# Patient Record
Sex: FEMALE | Race: WHITE | NOT HISPANIC OR LATINO | Employment: OTHER | URBAN - METROPOLITAN AREA
[De-identification: names, ages, dates, MRNs, and addresses within clinical notes are randomized per-mention and may not be internally consistent; named-entity substitution may affect disease eponyms.]

---

## 2023-03-28 ENCOUNTER — TELEPHONE (OUTPATIENT)
Dept: PRIMARY CARE | Facility: CLINIC | Age: 73
End: 2023-03-28
Payer: MEDICARE

## 2023-03-28 DIAGNOSIS — K04.7 DENTAL INFECTION: Primary | ICD-10-CM

## 2023-03-28 RX ORDER — AZITHROMYCIN 500 MG/1
500 TABLET, FILM COATED ORAL DAILY
Qty: 5 TABLET | Refills: 0 | Status: SHIPPED | OUTPATIENT
Start: 2023-03-28 | End: 2023-04-02

## 2023-03-28 NOTE — TELEPHONE ENCOUNTER
Patient calling states she has a dentist appointment tomorrow and her dentist told her to call her primary to get a antibiotic, patient is getting a tooth extracted.   Patient can not take penicillin and tetracycline.

## 2023-05-04 ENCOUNTER — PATIENT OUTREACH (OUTPATIENT)
Dept: CARE COORDINATION | Facility: CLINIC | Age: 73
End: 2023-05-04
Payer: MEDICARE

## 2023-05-04 DIAGNOSIS — I50.9 CONGESTIVE HEART FAILURE, UNSPECIFIED HF CHRONICITY, UNSPECIFIED HEART FAILURE TYPE (MULTI): ICD-10-CM

## 2023-05-04 DIAGNOSIS — R06.02 SHORTNESS OF BREATH: ICD-10-CM

## 2023-05-04 NOTE — PROGRESS NOTES
Discharge Facility:[facility]  Discharge Diagnosis:I50.9 Congestive heart failure, R06.02 Shortness of breath  Admission Date:5/2/23  Discharge Date: 5/3/23    PCP Appointment Date: no contact made  Specialist Appointment Date:   Hospital Encounter and Summary: not available at this time   See discharge assessment below for further details

## 2023-05-05 DIAGNOSIS — E13.9 DIABETES 1.5, MANAGED AS TYPE 2 (MULTI): Primary | ICD-10-CM

## 2023-05-05 RX ORDER — BENZONATATE 100 MG/1
CAPSULE ORAL
COMMUNITY

## 2023-05-05 RX ORDER — ALBUTEROL SULFATE 0.63 MG/3ML
SOLUTION RESPIRATORY (INHALATION)
COMMUNITY
End: 2023-07-31 | Stop reason: SDUPTHER

## 2023-05-05 RX ORDER — ERGOCALCIFEROL 1.25 MG/1
1 CAPSULE ORAL
COMMUNITY
Start: 2018-03-27 | End: 2023-12-11 | Stop reason: ALTCHOICE

## 2023-05-05 RX ORDER — GLIMEPIRIDE 2 MG/1
1 TABLET ORAL DAILY
COMMUNITY
Start: 2021-07-19 | End: 2023-12-11 | Stop reason: SDUPTHER

## 2023-05-05 RX ORDER — LEVOFLOXACIN 500 MG/1
500 TABLET, FILM COATED ORAL DAILY
COMMUNITY
End: 2023-05-11 | Stop reason: ALTCHOICE

## 2023-05-05 RX ORDER — BLOOD SUGAR DIAGNOSTIC
STRIP MISCELLANEOUS
COMMUNITY
Start: 2022-03-25

## 2023-05-05 RX ORDER — FUROSEMIDE 20 MG/1
1 TABLET ORAL DAILY
COMMUNITY
End: 2023-05-11 | Stop reason: ALTCHOICE

## 2023-05-05 RX ORDER — ENOXAPARIN SODIUM 100 MG/ML
INJECTION SUBCUTANEOUS
COMMUNITY
Start: 2023-04-18 | End: 2023-12-11 | Stop reason: ALTCHOICE

## 2023-05-05 RX ORDER — ALBUTEROL SULFATE 90 UG/1
AEROSOL, METERED RESPIRATORY (INHALATION)
COMMUNITY
Start: 2019-09-03

## 2023-05-05 RX ORDER — DEXAMETHASONE SODIUM PHOSPHATE 4 MG/ML
INJECTION, SOLUTION INTRA-ARTICULAR; INTRALESIONAL; INTRAMUSCULAR; INTRAVENOUS; SOFT TISSUE
COMMUNITY
Start: 2020-04-07 | End: 2023-05-11 | Stop reason: ALTCHOICE

## 2023-05-05 RX ORDER — AMLODIPINE BESYLATE 2.5 MG/1
1 TABLET ORAL DAILY
COMMUNITY
Start: 2023-03-09 | End: 2023-07-31 | Stop reason: ALTCHOICE

## 2023-05-05 RX ORDER — ASPIRIN 81 MG/1
TABLET ORAL
COMMUNITY
Start: 2019-08-21

## 2023-05-05 RX ORDER — CLINDAMYCIN HYDROCHLORIDE 150 MG/1
CAPSULE ORAL
COMMUNITY
Start: 2023-04-19 | End: 2023-12-11 | Stop reason: ALTCHOICE

## 2023-05-05 RX ORDER — GLIMEPIRIDE 2 MG/1
TABLET ORAL
Qty: 90 TABLET | Refills: 0 | Status: SHIPPED | OUTPATIENT
Start: 2023-05-05 | End: 2023-07-29

## 2023-05-05 RX ORDER — WARFARIN SODIUM 5 MG/1
TABLET ORAL
COMMUNITY
End: 2023-12-22 | Stop reason: DRUGHIGH

## 2023-05-05 RX ORDER — CHOLECALCIFEROL (VITAMIN D3) 50 MCG
TABLET ORAL
COMMUNITY

## 2023-05-05 RX ORDER — METHYLPREDNISOLONE ACETATE 80 MG/ML
INJECTION, SUSPENSION INTRA-ARTICULAR; INTRALESIONAL; INTRAMUSCULAR; SOFT TISSUE
COMMUNITY
Start: 2019-09-03 | End: 2023-05-11 | Stop reason: ALTCHOICE

## 2023-05-05 RX ORDER — AMLODIPINE BESYLATE 10 MG/1
10 TABLET ORAL DAILY
COMMUNITY
End: 2023-07-31 | Stop reason: SINTOL

## 2023-05-05 RX ORDER — LEVOTHYROXINE SODIUM 75 UG/1
75 TABLET ORAL DAILY
COMMUNITY
End: 2024-01-02

## 2023-05-05 RX ORDER — ATORVASTATIN CALCIUM 20 MG/1
1 TABLET, FILM COATED ORAL NIGHTLY
COMMUNITY
Start: 2018-03-07 | End: 2024-05-24 | Stop reason: SDUPTHER

## 2023-05-05 RX ORDER — BROMPHENIRAMINE MALEATE, PSEUDOEPHEDRINE HYDROCHLORIDE, AND DEXTROMETHORPHAN HYDROBROMIDE 2; 30; 10 MG/5ML; MG/5ML; MG/5ML
SYRUP ORAL
COMMUNITY
Start: 2022-05-04 | End: 2023-05-11 | Stop reason: ALTCHOICE

## 2023-05-05 RX ORDER — METHYLPREDNISOLONE 4 MG/1
TABLET ORAL
COMMUNITY
Start: 2022-12-13 | End: 2023-05-11 | Stop reason: ALTCHOICE

## 2023-05-05 RX ORDER — DOXYCYCLINE HYCLATE 50 MG/1
1 CAPSULE ORAL 2 TIMES DAILY
COMMUNITY
Start: 2023-03-01 | End: 2023-05-11 | Stop reason: ALTCHOICE

## 2023-05-05 RX ORDER — PREDNISONE 10 MG/1
TABLET ORAL
COMMUNITY
Start: 2020-04-07 | End: 2023-05-11 | Stop reason: ALTCHOICE

## 2023-05-05 RX ORDER — METOPROLOL SUCCINATE 25 MG/1
1 TABLET, EXTENDED RELEASE ORAL DAILY
COMMUNITY
Start: 2019-12-10

## 2023-05-05 RX ORDER — GABAPENTIN 100 MG/1
CAPSULE ORAL
COMMUNITY
Start: 2020-04-07

## 2023-05-10 ENCOUNTER — APPOINTMENT (OUTPATIENT)
Dept: PRIMARY CARE | Facility: CLINIC | Age: 73
End: 2023-05-10
Payer: MEDICARE

## 2023-05-11 ENCOUNTER — OFFICE VISIT (OUTPATIENT)
Dept: PRIMARY CARE | Facility: CLINIC | Age: 73
End: 2023-05-11
Payer: MEDICARE

## 2023-05-11 VITALS
DIASTOLIC BLOOD PRESSURE: 69 MMHG | HEIGHT: 62 IN | OXYGEN SATURATION: 98 % | SYSTOLIC BLOOD PRESSURE: 145 MMHG | WEIGHT: 220 LBS | BODY MASS INDEX: 40.48 KG/M2 | RESPIRATION RATE: 18 BRPM | HEART RATE: 78 BPM | TEMPERATURE: 97.7 F

## 2023-05-11 DIAGNOSIS — E78.2 MIXED HYPERLIPIDEMIA: ICD-10-CM

## 2023-05-11 DIAGNOSIS — E55.9 VITAMIN D DEFICIENCY: ICD-10-CM

## 2023-05-11 DIAGNOSIS — E11.9 DIABETES MELLITUS WITHOUT COMPLICATION (MULTI): ICD-10-CM

## 2023-05-11 DIAGNOSIS — J96.11 HYPOXEMIC RESPIRATORY FAILURE, CHRONIC (MULTI): ICD-10-CM

## 2023-05-11 DIAGNOSIS — Z79.01 ANTICOAGULATION MANAGEMENT ENCOUNTER: ICD-10-CM

## 2023-05-11 DIAGNOSIS — Z51.81 ANTICOAGULATION MANAGEMENT ENCOUNTER: ICD-10-CM

## 2023-05-11 DIAGNOSIS — J41.1 MUCOPURULENT CHRONIC BRONCHITIS (MULTI): ICD-10-CM

## 2023-05-11 DIAGNOSIS — E66.01 MORBID (SEVERE) OBESITY DUE TO EXCESS CALORIES (MULTI): ICD-10-CM

## 2023-05-11 DIAGNOSIS — Z09 HOSPITAL DISCHARGE FOLLOW-UP: Primary | ICD-10-CM

## 2023-05-11 DIAGNOSIS — I50.21 ACUTE SYSTOLIC CONGESTIVE HEART FAILURE (MULTI): ICD-10-CM

## 2023-05-11 DIAGNOSIS — I10 PRIMARY HYPERTENSION: ICD-10-CM

## 2023-05-11 DIAGNOSIS — J45.40 MODERATE PERSISTENT ASTHMATIC BRONCHITIS WITHOUT COMPLICATION (HHS-HCC): ICD-10-CM

## 2023-05-11 LAB
ALANINE AMINOTRANSFERASE (SGPT) (U/L) IN SER/PLAS: 15 U/L (ref 7–45)
ALBUMIN (G/DL) IN SER/PLAS: 4.4 G/DL (ref 3.4–5)
ALKALINE PHOSPHATASE (U/L) IN SER/PLAS: 95 U/L (ref 33–136)
ANION GAP IN SER/PLAS: 13 MMOL/L (ref 10–20)
ASPARTATE AMINOTRANSFERASE (SGOT) (U/L) IN SER/PLAS: 16 U/L (ref 9–39)
BILIRUBIN TOTAL (MG/DL) IN SER/PLAS: 1.5 MG/DL (ref 0–1.2)
CALCIDIOL (25 OH VITAMIN D3) (NG/ML) IN SER/PLAS: 31 NG/ML
CALCIUM (MG/DL) IN SER/PLAS: 9.8 MG/DL (ref 8.6–10.6)
CARBON DIOXIDE, TOTAL (MMOL/L) IN SER/PLAS: 31 MMOL/L (ref 21–32)
CHLORIDE (MMOL/L) IN SER/PLAS: 99 MMOL/L (ref 98–107)
CHOLESTEROL (MG/DL) IN SER/PLAS: 142 MG/DL (ref 0–199)
CHOLESTEROL IN HDL (MG/DL) IN SER/PLAS: 44.3 MG/DL
CHOLESTEROL/HDL RATIO: 3.2
CREATININE (MG/DL) IN SER/PLAS: 0.97 MG/DL (ref 0.5–1.05)
ERYTHROCYTE DISTRIBUTION WIDTH (RATIO) BY AUTOMATED COUNT: 15 % (ref 11.5–14.5)
ERYTHROCYTE MEAN CORPUSCULAR HEMOGLOBIN CONCENTRATION (G/DL) BY AUTOMATED: 30.7 G/DL (ref 32–36)
ERYTHROCYTE MEAN CORPUSCULAR VOLUME (FL) BY AUTOMATED COUNT: 87 FL (ref 80–100)
ERYTHROCYTES (10*6/UL) IN BLOOD BY AUTOMATED COUNT: 4.36 X10E12/L (ref 4–5.2)
GFR FEMALE: 62 ML/MIN/1.73M2
GLUCOSE (MG/DL) IN SER/PLAS: 170 MG/DL (ref 74–99)
HEMATOCRIT (%) IN BLOOD BY AUTOMATED COUNT: 37.8 % (ref 36–46)
HEMOGLOBIN (G/DL) IN BLOOD: 11.6 G/DL (ref 12–16)
LDL: 67 MG/DL (ref 0–99)
LEUKOCYTES (10*3/UL) IN BLOOD BY AUTOMATED COUNT: 6.8 X10E9/L (ref 4.4–11.3)
NRBC (PER 100 WBCS) BY AUTOMATED COUNT: 0 /100 WBC (ref 0–0)
PLATELETS (10*3/UL) IN BLOOD AUTOMATED COUNT: 321 X10E9/L (ref 150–450)
POC FINGERSTICK BLOOD GLUCOSE: 175 MG/DL (ref 70–100)
POC INR: 3.3 (ref 0.9–1.1)
POTASSIUM (MMOL/L) IN SER/PLAS: 4 MMOL/L (ref 3.5–5.3)
PROTEIN TOTAL: 7.6 G/DL (ref 6.4–8.2)
SODIUM (MMOL/L) IN SER/PLAS: 139 MMOL/L (ref 136–145)
THYROTROPIN (MIU/L) IN SER/PLAS BY DETECTION LIMIT <= 0.05 MIU/L: 1.67 MIU/L (ref 0.44–3.98)
TRIGLYCERIDE (MG/DL) IN SER/PLAS: 153 MG/DL (ref 0–149)
UREA NITROGEN (MG/DL) IN SER/PLAS: 23 MG/DL (ref 6–23)
VLDL: 31 MG/DL (ref 0–40)

## 2023-05-11 PROCEDURE — 3044F HG A1C LEVEL LT 7.0%: CPT | Performed by: FAMILY MEDICINE

## 2023-05-11 PROCEDURE — 82306 VITAMIN D 25 HYDROXY: CPT

## 2023-05-11 PROCEDURE — 85027 COMPLETE CBC AUTOMATED: CPT

## 2023-05-11 PROCEDURE — 85610 PROTHROMBIN TIME: CPT | Performed by: FAMILY MEDICINE

## 2023-05-11 PROCEDURE — 84443 ASSAY THYROID STIM HORMONE: CPT

## 2023-05-11 PROCEDURE — 82962 GLUCOSE BLOOD TEST: CPT | Performed by: FAMILY MEDICINE

## 2023-05-11 PROCEDURE — 1159F MED LIST DOCD IN RCRD: CPT | Performed by: FAMILY MEDICINE

## 2023-05-11 PROCEDURE — 99495 TRANSJ CARE MGMT MOD F2F 14D: CPT | Performed by: FAMILY MEDICINE

## 2023-05-11 PROCEDURE — 83036 HEMOGLOBIN GLYCOSYLATED A1C: CPT

## 2023-05-11 PROCEDURE — 80061 LIPID PANEL: CPT

## 2023-05-11 PROCEDURE — 3078F DIAST BP <80 MM HG: CPT | Performed by: FAMILY MEDICINE

## 2023-05-11 PROCEDURE — 3077F SYST BP >= 140 MM HG: CPT | Performed by: FAMILY MEDICINE

## 2023-05-11 PROCEDURE — 1036F TOBACCO NON-USER: CPT | Performed by: FAMILY MEDICINE

## 2023-05-11 PROCEDURE — 80053 COMPREHEN METABOLIC PANEL: CPT

## 2023-05-11 RX ORDER — FUROSEMIDE 40 MG/1
40 TABLET ORAL 2 TIMES DAILY
COMMUNITY

## 2023-05-11 ASSESSMENT — PAIN SCALES - GENERAL: PAINLEVEL: 0-NO PAIN

## 2023-05-12 LAB
ESTIMATED AVERAGE GLUCOSE FOR HBA1C: 128 MG/DL
HEMOGLOBIN A1C/HEMOGLOBIN TOTAL IN BLOOD: 6.1 %

## 2023-05-13 PROBLEM — J45.909 ASTHMATIC BRONCHITIS WITHOUT COMPLICATION (HHS-HCC): Status: ACTIVE | Noted: 2023-05-13

## 2023-05-13 PROBLEM — Z09 HOSPITAL DISCHARGE FOLLOW-UP: Status: ACTIVE | Noted: 2023-05-13

## 2023-05-13 PROBLEM — J96.11 HYPOXEMIC RESPIRATORY FAILURE, CHRONIC (MULTI): Status: ACTIVE | Noted: 2023-05-13

## 2023-05-13 PROBLEM — J41.1 MUCOPURULENT CHRONIC BRONCHITIS (MULTI): Status: ACTIVE | Noted: 2023-05-13

## 2023-05-13 PROBLEM — E66.01 MORBID (SEVERE) OBESITY DUE TO EXCESS CALORIES (MULTI): Status: ACTIVE | Noted: 2023-05-13

## 2023-05-13 NOTE — PROGRESS NOTES
Subjective   Genet Wilks is a 73 y.o. female who presents for Follow-up (Pt reports swollen feet an legs and reports CHF).    HPI  : Patient is a 73-year-old female who is being seen in hospital follow-up after being admitted 9 days ago for congestive heart failure and shortness of breath.  Patient also had episodes of uncontrolled atrial fibrillation and she is diabetic and has a history of asthmatic bronchitis.  Did review her hospital chart and treatment.  She was diuresed and was seen by cardiology in  consultation.  Patient is still concerned because her ankles are swollen but she is not short of breath like she was and she needs her Coumadin INR rechecked today and her blood sugar.  Pulse ox was stable at 98%.      Objective ROS  : 10 systems were reviewed and the information is included in the HPI and no additional review of systems is indicated.    Physical Exam  Constitutional:       Appearance: Normal appearance. She is obese.      Comments: Patient is alert and oriented and feeling much better than she did 9 days ago when she went into the hospital.  She still has some ankle edema and some shortness of breath with ambulation but otherwise is feeling much better.  She knows she has to get back on her diet and lose weight.   HENT:      Head: Normocephalic.      Right Ear: Tympanic membrane and ear canal normal.      Left Ear: Tympanic membrane and ear canal normal.      Nose: Rhinorrhea present.      Comments: Patient does have allergic rhinitis.  Spring and summer aggravate her allergies.     Mouth/Throat:      Mouth: Mucous membranes are moist.      Pharynx: Oropharynx is clear.      Comments: Mouth is moist, tongue is midline, no posterior pharyngeal irritation.  Eyes:      Extraocular Movements: Extraocular movements intact.      Conjunctiva/sclera: Conjunctivae normal.      Pupils: Pupils are equal, round, and reactive to light.      Comments: Patient denies any visual problems.   Neck:       Comments: Patient does have a large broad neck with mild restriction of motion due to arthritis, spasm, and tension.  Cardiovascular:      Rate and Rhythm: Normal rate. Rhythm irregular.      Heart sounds: Murmur heard.      Comments: Patient has irregular atrial fibrillation and has a grade 3 systolic murmur.  She also has had previous mitral valve replacement surgery years ago.  He has a history of infected endocarditis with valve issues.  Pulmonary:      Breath sounds: Rhonchi present.      Comments: Few basilar rhonchi to auscultation.       No coughing or wheezing and no rales.       Patient does do home nebulizer treatments.  Abdominal:      Palpations: Abdomen is soft.      Comments: Abdomen is soft and obese, no abdominal tenderness and she is trying to diet and lose weight.  Denies diarrhea or constipation.  No nausea or vomiting.   Genitourinary:     Comments: This exam was deferred.  Patient denies any urinary problems.  No dysuria or hematuria.  Musculoskeletal:      Cervical back: Tenderness present.      Right lower leg: Edema present.      Left lower leg: Edema present.      Comments: Patient has arthritis in her cervical thoracic and lumbar spines with restriction of motion.  He also has arthritis in her knees and ankles.  Does ambulate occasionally with a cane due to severe arthritis pain.  She also has bilateral ankle edema.   That is still from her hospital stay.   Skin:     General: Skin is dry.      Coloration: Skin is pale.      Comments: Skin is dry and patient is always pale.  Does  not go out in the  Sun very much since the  sun   irritates her skin.   Neurological:      General: No focal deficit present.      Mental Status: She is alert and oriented to person, place, and time.      Comments: Patient has no focal deficits.  Does have some peripheral diabetic neuropathy of her feet and lower legs.  Denies any upper extremity muscle weakness.  Does have a somewhat unsteady gait and  coordination is as good as it should be.  No previous history of CVAs.   Psychiatric:         Mood and Affect: Mood normal.         Behavior: Behavior normal.         Thought Content: Thought content normal.         Judgment: Judgment normal.      Comments: Patient always has anxiety since her son and daughter-in-law and her baby live with her.  She also has been a little more depressed since her  passed away several years ago.  She otherwise has normal thought content and good judgment.  Patient gets along very well and observes her general health care.     PLAN  : Patient is a 73-year-old female who was evaluated in hospital follow-up after she was admitted for congestive heart failure and pulmonary edema.  She also had irregular atrial fibrillation and was diuresed and evaluated by cardiology.  She did improve with the above treatment.  She still has swollen ankles and I told her we will take a couple of weeks before that improves.  She also has anemia which adds to the edema.  Patient's diabetes is stable.  I did review the hospital records from admission and she is doing much better and her weight is down.  She will continue on diuretics Lasix 40 mg twice daily,and she follows up with cardiology next week.  Her Coumadin INR was 3.3% and she is taking 2.5 mg of Coumadin every day except Monday she takes 5 mg.  She will see me in 2 months or sooner as needed.  She will be notified of her blood results.    Problem List Items Addressed This Visit          Circulatory    Acute systolic congestive heart failure (CMS/HCC) - Primary    Primary hypertension    Relevant Orders    CBC (Completed)    Comprehensive Metabolic Panel (Completed)    Lipid Panel (Completed)    Thyroid Stimulating Hormone (Completed)       Endocrine/Metabolic    Diabetes mellitus without complication (CMS/HCC)    Relevant Orders    POCT fingerstick glucose manually resulted (Completed)    Hemoglobin A1C (Completed)    Vitamin D deficiency     Relevant Orders    Vitamin D, Total (Completed)       Other    Anticoagulation management encounter    Relevant Orders    POCT INR manually resulted (Completed)    Mixed hyperlipidemia    Relevant Orders    CBC (Completed)    Comprehensive Metabolic Panel (Completed)    Lipid Panel (Completed)    Thyroid Stimulating Hormone (Completed)            Mitesh Simms DO

## 2023-05-15 DIAGNOSIS — I50.21 ACUTE SYSTOLIC CONGESTIVE HEART FAILURE (MULTI): ICD-10-CM

## 2023-05-15 DIAGNOSIS — E11.9 DIABETES MELLITUS WITHOUT COMPLICATION (MULTI): Primary | ICD-10-CM

## 2023-06-06 ENCOUNTER — PATIENT OUTREACH (OUTPATIENT)
Dept: CARE COORDINATION | Facility: CLINIC | Age: 73
End: 2023-06-06
Payer: MEDICARE

## 2023-06-06 NOTE — PROGRESS NOTES
Unable to reach patient for call back after patient's follow up appointment with PCP.   MURPHYM with call back number for patient to call if needed   If no voicemail available call attempts x 2 were made to contact the patient to assist with any questions or concerns patient may have.

## 2023-06-07 RX ORDER — LEVOTHYROXINE SODIUM 75 UG/1
1 TABLET ORAL DAILY
COMMUNITY
Start: 2018-12-10 | End: 2023-12-11 | Stop reason: SDUPTHER

## 2023-06-07 RX ORDER — DEXAMETHASONE SODIUM PHOSPHATE 4 MG/ML
INJECTION, SOLUTION INTRA-ARTICULAR; INTRALESIONAL; INTRAMUSCULAR; INTRAVENOUS; SOFT TISSUE
COMMUNITY
Start: 2021-04-13 | End: 2023-07-31 | Stop reason: ALTCHOICE

## 2023-06-08 ENCOUNTER — APPOINTMENT (OUTPATIENT)
Dept: PRIMARY CARE | Facility: CLINIC | Age: 73
End: 2023-06-08
Payer: MEDICARE

## 2023-06-15 ENCOUNTER — APPOINTMENT (OUTPATIENT)
Dept: PRIMARY CARE | Facility: CLINIC | Age: 73
End: 2023-06-15
Payer: MEDICARE

## 2023-06-27 ENCOUNTER — APPOINTMENT (OUTPATIENT)
Dept: PRIMARY CARE | Facility: CLINIC | Age: 73
End: 2023-06-27
Payer: MEDICARE

## 2023-07-11 ENCOUNTER — APPOINTMENT (OUTPATIENT)
Dept: PRIMARY CARE | Facility: CLINIC | Age: 73
End: 2023-07-11
Payer: MEDICARE

## 2023-07-21 ENCOUNTER — PATIENT OUTREACH (OUTPATIENT)
Dept: CARE COORDINATION | Facility: CLINIC | Age: 73
End: 2023-07-21
Payer: MEDICARE

## 2023-07-21 NOTE — PROGRESS NOTES
Call placed regarding one month post discharge follow up call.  At time of outreach call the patient feels as if their condition has improved since initial visit with PCP or specialist. Questions or concerns regarding recovery period addressed at this time. Reviewed any PCP or specialists progress notes/labs/radiology reports if applicable and addressed any questions or concerns.

## 2023-07-28 DIAGNOSIS — E13.9 DIABETES 1.5, MANAGED AS TYPE 2 (MULTI): ICD-10-CM

## 2023-07-29 RX ORDER — GLIMEPIRIDE 2 MG/1
TABLET ORAL
Qty: 90 TABLET | Refills: 0 | Status: SHIPPED | OUTPATIENT
Start: 2023-07-29 | End: 2024-01-02

## 2023-07-31 ENCOUNTER — OFFICE VISIT (OUTPATIENT)
Dept: PRIMARY CARE | Facility: CLINIC | Age: 73
End: 2023-07-31
Payer: MEDICARE

## 2023-07-31 VITALS
SYSTOLIC BLOOD PRESSURE: 142 MMHG | DIASTOLIC BLOOD PRESSURE: 72 MMHG | BODY MASS INDEX: 39.56 KG/M2 | RESPIRATION RATE: 18 BRPM | HEIGHT: 62 IN | HEART RATE: 85 BPM | TEMPERATURE: 98.1 F | OXYGEN SATURATION: 96 % | WEIGHT: 215 LBS

## 2023-07-31 DIAGNOSIS — J45.41 MODERATE PERSISTENT ASTHMATIC BRONCHITIS WITH ACUTE EXACERBATION (HHS-HCC): ICD-10-CM

## 2023-07-31 DIAGNOSIS — Z79.01 ANTICOAGULATION MANAGEMENT ENCOUNTER: ICD-10-CM

## 2023-07-31 DIAGNOSIS — I50.9 CHRONIC CONGESTIVE HEART FAILURE, UNSPECIFIED HEART FAILURE TYPE (MULTI): ICD-10-CM

## 2023-07-31 DIAGNOSIS — E66.01 MORBID (SEVERE) OBESITY DUE TO EXCESS CALORIES (MULTI): ICD-10-CM

## 2023-07-31 DIAGNOSIS — Z51.81 ANTICOAGULATION MANAGEMENT ENCOUNTER: ICD-10-CM

## 2023-07-31 DIAGNOSIS — M25.472 ANKLE EDEMA, BILATERAL: Primary | ICD-10-CM

## 2023-07-31 DIAGNOSIS — I10 PRIMARY HYPERTENSION: ICD-10-CM

## 2023-07-31 DIAGNOSIS — M25.471 ANKLE EDEMA, BILATERAL: Primary | ICD-10-CM

## 2023-07-31 DIAGNOSIS — E11.9 DIABETES MELLITUS WITHOUT COMPLICATION (MULTI): ICD-10-CM

## 2023-07-31 PROBLEM — J45.901 ASTHMATIC BRONCHITIS WITH ACUTE EXACERBATION (HHS-HCC): Status: ACTIVE | Noted: 2023-07-31

## 2023-07-31 PROCEDURE — 1036F TOBACCO NON-USER: CPT | Performed by: FAMILY MEDICINE

## 2023-07-31 PROCEDURE — 1159F MED LIST DOCD IN RCRD: CPT | Performed by: FAMILY MEDICINE

## 2023-07-31 PROCEDURE — 3044F HG A1C LEVEL LT 7.0%: CPT | Performed by: FAMILY MEDICINE

## 2023-07-31 PROCEDURE — 3008F BODY MASS INDEX DOCD: CPT | Performed by: FAMILY MEDICINE

## 2023-07-31 PROCEDURE — 99214 OFFICE O/P EST MOD 30 MIN: CPT | Performed by: FAMILY MEDICINE

## 2023-07-31 PROCEDURE — 3078F DIAST BP <80 MM HG: CPT | Performed by: FAMILY MEDICINE

## 2023-07-31 PROCEDURE — 1126F AMNT PAIN NOTED NONE PRSNT: CPT | Performed by: FAMILY MEDICINE

## 2023-07-31 PROCEDURE — 3077F SYST BP >= 140 MM HG: CPT | Performed by: FAMILY MEDICINE

## 2023-07-31 PROCEDURE — 1160F RVW MEDS BY RX/DR IN RCRD: CPT | Performed by: FAMILY MEDICINE

## 2023-07-31 RX ORDER — AMLODIPINE BESYLATE 5 MG/1
5 TABLET ORAL DAILY
Qty: 30 TABLET | Refills: 5 | Status: SHIPPED | OUTPATIENT
Start: 2023-07-31 | End: 2024-02-25

## 2023-07-31 RX ORDER — ALBUTEROL SULFATE 0.63 MG/3ML
0.63 SOLUTION RESPIRATORY (INHALATION)
Qty: 75 ML | Refills: 3 | Status: SHIPPED | OUTPATIENT
Start: 2023-07-31 | End: 2024-01-02

## 2023-07-31 ASSESSMENT — PAIN SCALES - GENERAL: PAINLEVEL: 0-NO PAIN

## 2023-07-31 NOTE — PROGRESS NOTES
Subjective   eGnet Wilks is a 73 y.o. female who presents for Follow-up (Patient is diabetic, patient complains of both legs and ankles swelling, also complains of allergies).    HPI  :  Sinus congestion , allergies,  and   ankle and leg edema.  Patient has seen cardiology in May and her medicine was readjusted, her Norvasc was increased to 10 mg daily and the patient has severe ankle and foot edema.  She states she had trouble getting her shoes on and in spite of taking Lasix twice a day the swelling has not improved.  We are going to have to decrease the Norvasc and she will be seeing cardiology in about 3 weeks.  Her blood pressure did improve with the use of Norvasc but she has such significant ankle swelling that she can hardly walk.  Previously she was on 2.5 mg.  She also states that with the hot weather her asthma has been causing her some problems and she needs a refill on her albuterol for her nebulizer machine.  Patient had a diabetic check last time she was here so we will wait till next visit to check her sugar and A1c.  She does check her sugars at home and she states they have been stable.      Objective  : ROS :10 systems were reviewed and the information is included in the HPI and no additional review of systems is indicated.    Physical Exam  Vitals and nursing note reviewed.   Constitutional:       Appearance: Normal appearance. She is obese.      Comments: Patient is alert and oriented x 3, does suffer with obesity and has increasing ankle and foot edema.   HENT:      Head: Normocephalic.      Right Ear: Tympanic membrane and external ear normal.      Left Ear: Tympanic membrane and external ear normal.      Nose: Rhinorrhea present.      Comments: Patient has chronic allergies and this summer with the hot weather she has increased nasal congestion.     Mouth/Throat:      Mouth: Mucous membranes are moist.      Pharynx: Oropharynx is clear.      Comments: Mouth is moist, tongue is midline, no  posterior pharyngeal erythema.  Eyes:      Extraocular Movements: Extraocular movements intact.      Conjunctiva/sclera: Conjunctivae normal.      Pupils: Pupils are equal, round, and reactive to light.      Comments: Patient denies any blurred or double vision, does have an eye exam yearly.   Neck:      Comments: Patient has a short stout neck with restriction of motion.  No cervical adenopathy, no carotid bruits, no thyromegaly.  Cardiovascular:      Rate and Rhythm: Normal rate and regular rhythm.      Pulses: Normal pulses.      Heart sounds: Normal heart sounds.      Comments: Long history of valvular heart disease and valve replacement.  She does have a grade 3 out of 6 systolic murmur.   Significant increase in leg and ankle edema and both feet are swollen.  I do think this is from the [facility].  Pulmonary:      Effort: Pulmonary effort is normal.      Breath sounds: Wheezing present.      Comments: Patient has a long history of asthmatic bronchitis.  She currently does have some wheezing in both lung fields.  She has tried to use her nebulizer 2-3 times daily with albuterol treatments.  Pulse ox was stable at 96% and she is not excessively short of breath.  Today is much cooler days so I am sure that makes a difference.  Abdominal:      General: Bowel sounds are normal.      Palpations: Abdomen is soft.      Comments: Abdomen is soft and obese, there is no specific tenderness but it is difficult to evaluate due to obesity.     Genitourinary:     Comments: Patient denies flank pain, no dysuria and no hematuria.  Musculoskeletal:         General: Swelling present. Normal range of motion.      Right lower leg: Edema present.      Left lower leg: Edema present.      Comments: Restricted range of motion cervical thoracic and lumbar spines due to degenerative arthritis.  Patient also has arthritis in her hips and knees.  Today there is significant leg swelling, ankle swelling and foot swelling so she has difficulty  ambulating and had trouble getting her shoes on.   Skin:     General: Skin is warm and dry.      Comments: Skin is warm and dry to touch.  Patient denies any skin lesions, no rashes or bruising.  Denies erythema.   Neurological:      Mental Status: She is alert and oriented to person, place, and time. Mental status is at baseline.      Coordination: Coordination abnormal.      Comments: Patient is at baseline, no focal neurosensory deficits are noted.  Difficult to evaluate lower extremities due to edema.  Normal strength upper extremities.  Currently ambulating without any cane or walker.  Does have some peripheral neuropathy in her legs and feet.  This is most likely from her diabetes.   Psychiatric:         Behavior: Behavior normal.         Thought Content: Thought content normal.         Judgment: Judgment normal.      Comments: Patient has a lot of stress and anxiety since her son and daughter-in-law and 2 children live with her.  This has been a stressful situation for several years.  Her thought content and judgment are also stable and behavior is normal.     PLAN  : Patient is a 73-year-old diabetic female who also has a history of asthmatic bronchitis and chronic congestive heart failure.  Her Norvasc was recently increased to 10 mg daily and she has significant leg and ankle and foot swelling.  She could hardly get her shoes on today so I did recommend that we decrease to 5 mg daily and she can talk to her cardiologist about it next visit.  She is also trying to take 40 mg of Lasix twice daily.  Patient's asthma has been causing her some problems due to the increased heat recently outdoors and also the smog from the [employer] fires.  I did renew her albuterol for her nebulizer machine and she will try to do treatments 3 times daily.  She otherwise will follow-up in 2 to 3 months or sooner as needed.    Problem List Items Addressed This Visit    None  Visit Diagnoses       Ankle edema, bilateral    -   Primary                 Mitesh Simms, DO

## 2023-08-01 ENCOUNTER — PATIENT OUTREACH (OUTPATIENT)
Dept: CARE COORDINATION | Facility: CLINIC | Age: 73
End: 2023-08-01
Payer: MEDICARE

## 2023-08-01 NOTE — PROGRESS NOTES
Unable to reach patient for 90 days post discharge follow up call.   M with call back number for patient to call if needed   If no voicemail available call attempts x 2 were made to contact the patient to assist with any questions or concerns patient may have.

## 2023-10-02 ENCOUNTER — APPOINTMENT (OUTPATIENT)
Dept: PRIMARY CARE | Facility: CLINIC | Age: 73
End: 2023-10-02
Payer: MEDICARE

## 2023-10-04 PROBLEM — J45.909 ASTHMATIC BRONCHITIS (HHS-HCC): Status: ACTIVE | Noted: 2023-10-04

## 2023-10-04 PROBLEM — R06.09 DOE (DYSPNEA ON EXERTION): Status: ACTIVE | Noted: 2023-10-04

## 2023-10-04 PROBLEM — Z95.2 MECHANICAL HEART VALVE PRESENT: Status: ACTIVE | Noted: 2023-10-04
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
## 2023-10-04 PROBLEM — M51.9 LUMBOSACRAL DISC DISEASE: Status: ACTIVE | Noted: 2023-10-04

## 2023-10-04 PROBLEM — J44.9 COPD (CHRONIC OBSTRUCTIVE PULMONARY DISEASE) (MULTI): Status: ACTIVE | Noted: 2023-10-04

## 2023-10-04 PROBLEM — E66.9 OBESITY: Status: ACTIVE | Noted: 2023-10-04

## 2023-10-04 PROBLEM — I48.91 ATRIAL FIBRILLATION (MULTI): Status: ACTIVE | Noted: 2023-10-04

## 2023-10-04 PROBLEM — Z79.899 HIGH RISK MEDICATION USE: Status: ACTIVE | Noted: 2023-10-04

## 2023-10-04 PROBLEM — Z99.81 OXYGEN DEPENDENT: Status: ACTIVE | Noted: 2023-10-04

## 2023-10-04 PROBLEM — E78.5 HYPERLIPIDEMIA: Status: ACTIVE | Noted: 2023-10-04

## 2023-10-04 PROBLEM — R94.31 ABNORMAL EKG: Status: ACTIVE | Noted: 2023-10-04

## 2023-10-04 PROBLEM — R00.2 PALPITATIONS: Status: ACTIVE | Noted: 2023-10-04

## 2023-10-04 PROBLEM — R92.8 ABNORMAL MAMMOGRAM OF RIGHT BREAST: Status: ACTIVE | Noted: 2023-10-04

## 2023-10-04 PROBLEM — S50.11XA CONTUSION OF RIGHT FOREARM: Status: ACTIVE | Noted: 2023-10-04

## 2023-10-04 PROBLEM — I10 HYPERTENSION: Status: ACTIVE | Noted: 2023-10-04

## 2023-10-04 PROBLEM — I50.30 (HFPEF) HEART FAILURE WITH PRESERVED EJECTION FRACTION (MULTI): Status: ACTIVE | Noted: 2023-10-04

## 2023-10-04 PROBLEM — M54.31 SCIATICA OF RIGHT SIDE: Status: ACTIVE | Noted: 2023-10-04

## 2023-10-04 PROBLEM — E11.9 DIABETES MELLITUS (MULTI): Status: ACTIVE | Noted: 2023-10-04

## 2023-10-04 PROBLEM — H53.8 BLURRY VISION: Status: ACTIVE | Noted: 2023-10-04

## 2023-10-04 PROBLEM — Z63.4 BEREAVEMENT: Status: ACTIVE | Noted: 2023-10-04

## 2023-10-04 PROBLEM — E03.9 HYPOTHYROIDISM: Status: ACTIVE | Noted: 2023-10-04

## 2023-10-05 ENCOUNTER — ANTICOAGULATION - WARFARIN VISIT (OUTPATIENT)
Dept: PHARMACY | Facility: CLINIC | Age: 73
End: 2023-10-05
Payer: MEDICARE

## 2023-10-05 DIAGNOSIS — Z95.2 MECHANICAL HEART VALVE PRESENT: ICD-10-CM

## 2023-10-05 DIAGNOSIS — Z95.2 HISTORY OF PROSTHETIC HEART VALVE: Primary | ICD-10-CM

## 2023-10-05 LAB
POC INR: 2
POC PROTHROMBIN TIME: NORMAL

## 2023-10-05 PROCEDURE — 85610 PROTHROMBIN TIME: CPT | Performed by: PHARMACIST

## 2023-10-05 PROCEDURE — 99212 OFFICE O/P EST SF 10 MIN: CPT | Performed by: PHARMACIST

## 2023-10-05 NOTE — PROGRESS NOTES
Coumadin Clinic Visit Note    Patient verified warfarin dose  No missed doses  No unusual bruising or bleeding  No changes to medications  She had a lot of greens intake last month so INR low   No anticipated procedures at this time  INR Subtherapeutic today at 2  Range is 2.5-3.5  We did not see patient since August , she was ok though   Patient is having 6 tooth pulled out to do a bridge , her dentist appointment was cancelled since they are trying to get an estimate from the insurance to give her a price , she is so worried about the process .SHEknows she needs bridging with lovenox , have some syringes from last time , I explained to her that we need how many days she will stop and the date of procedure so we can do bridging sheet ., she will call us ASAP as she knows , look previous note   [place] today , same weekly   Next appointment 4 weeks      Nancy Ludwig, PharmD

## 2023-10-09 ENCOUNTER — APPOINTMENT (OUTPATIENT)
Dept: PRIMARY CARE | Facility: CLINIC | Age: 73
End: 2023-10-09
Payer: MEDICARE

## 2023-10-12 ENCOUNTER — APPOINTMENT (OUTPATIENT)
Dept: PRIMARY CARE | Facility: CLINIC | Age: 73
End: 2023-10-12
Payer: MEDICARE

## 2023-10-19 ENCOUNTER — APPOINTMENT (OUTPATIENT)
Dept: PRIMARY CARE | Facility: CLINIC | Age: 73
End: 2023-10-19
Payer: MEDICARE

## 2023-11-02 ENCOUNTER — APPOINTMENT (OUTPATIENT)
Dept: PHARMACY | Facility: CLINIC | Age: 73
End: 2023-11-02
Payer: MEDICARE

## 2023-11-02 ENCOUNTER — ANTICOAGULATION - WARFARIN VISIT (OUTPATIENT)
Dept: PHARMACY | Facility: CLINIC | Age: 73
End: 2023-11-02
Payer: MEDICARE

## 2023-11-02 DIAGNOSIS — Z95.2 MECHANICAL HEART VALVE PRESENT: Primary | ICD-10-CM

## 2023-11-02 LAB
POC INR: 7
POC PROTHROMBIN TIME: NORMAL

## 2023-11-02 PROCEDURE — 99212 OFFICE O/P EST SF 10 MIN: CPT | Performed by: PHARMACIST

## 2023-11-02 PROCEDURE — 85610 PROTHROMBIN TIME: CPT | Mod: QW | Performed by: PHARMACIST

## 2023-11-02 NOTE — PROGRESS NOTES
Pt still does not have a date for dental procedure - She is going to stop at dental office today and try to figure it out.  Verified current dose with pt.    No new meds or med changes since last visit.  Pt says she did cough up some blood yesterday and this morning  Pt says she is not eating too well - she is having a little depression (missing her )  No illness  She took 1 tylenol last week  No etoh/no nicotine  No diet changes, just eating less  Pt says she has been eating less green vegetables than usual (she usually has 1-2 salads/week, but not been eating)  Pt has lost 12 lbs in the past week and a half  No upcoming procedures.  Inr = 7  Unsure why inr is so high except for pt not eating as much (but still eating)  Hold warfarin (4 days)    Back next monday

## 2023-11-06 ENCOUNTER — ANTICOAGULATION - WARFARIN VISIT (OUTPATIENT)
Dept: PHARMACY | Facility: CLINIC | Age: 73
End: 2023-11-06
Payer: MEDICARE

## 2023-11-06 DIAGNOSIS — Z95.2 MECHANICAL HEART VALVE PRESENT: Primary | ICD-10-CM

## 2023-11-06 LAB
POC INR: 2.4
POC PROTHROMBIN TIME: NORMAL

## 2023-11-06 PROCEDURE — 85610 PROTHROMBIN TIME: CPT | Mod: QW

## 2023-11-06 PROCEDURE — 99212 OFFICE O/P EST SF 10 MIN: CPT

## 2023-11-06 NOTE — PROGRESS NOTES
Fri 11/3, Sat 11/4, Sun 11/5 pt had a bloody nose.  Medications and doses verified.   Confirmed 4 day Warfarin hold.  Dental appointment is not scheduled yet. Pt states she is going today to dentist to schedule. Will need to hold Warfarin prior to dental procedure. Pt will let clinic know if and when dental procedure scheduled.  INR=2.4   Plan: Resume usual weekly dose.  Follow up INR check in 1 week.

## 2023-11-09 ENCOUNTER — OFFICE VISIT (OUTPATIENT)
Dept: PRIMARY CARE | Facility: CLINIC | Age: 73
End: 2023-11-09
Payer: MEDICARE

## 2023-11-09 VITALS
SYSTOLIC BLOOD PRESSURE: 142 MMHG | DIASTOLIC BLOOD PRESSURE: 81 MMHG | WEIGHT: 219 LBS | HEIGHT: 62 IN | TEMPERATURE: 97.7 F | BODY MASS INDEX: 40.3 KG/M2 | RESPIRATION RATE: 18 BRPM | HEART RATE: 83 BPM | OXYGEN SATURATION: 93 %

## 2023-11-09 DIAGNOSIS — M17.0 PRIMARY OSTEOARTHRITIS OF BOTH KNEES: ICD-10-CM

## 2023-11-09 DIAGNOSIS — E66.01 CLASS 3 SEVERE OBESITY DUE TO EXCESS CALORIES WITH SERIOUS COMORBIDITY AND BODY MASS INDEX (BMI) OF 40.0 TO 44.9 IN ADULT (MULTI): ICD-10-CM

## 2023-11-09 DIAGNOSIS — E78.2 MIXED HYPERLIPIDEMIA: ICD-10-CM

## 2023-11-09 DIAGNOSIS — Z51.81 ANTICOAGULATION MANAGEMENT ENCOUNTER: ICD-10-CM

## 2023-11-09 DIAGNOSIS — Z79.01 ANTICOAGULATION MANAGEMENT ENCOUNTER: ICD-10-CM

## 2023-11-09 DIAGNOSIS — I48.21 PERMANENT ATRIAL FIBRILLATION (MULTI): ICD-10-CM

## 2023-11-09 DIAGNOSIS — E11.9 DIABETES MELLITUS WITHOUT COMPLICATION (MULTI): Primary | ICD-10-CM

## 2023-11-09 DIAGNOSIS — J44.9 CHRONIC OBSTRUCTIVE PULMONARY DISEASE, UNSPECIFIED COPD TYPE (MULTI): ICD-10-CM

## 2023-11-09 DIAGNOSIS — D50.8 IRON DEFICIENCY ANEMIA SECONDARY TO INADEQUATE DIETARY IRON INTAKE: ICD-10-CM

## 2023-11-09 DIAGNOSIS — E03.9 ACQUIRED HYPOTHYROIDISM: ICD-10-CM

## 2023-11-09 DIAGNOSIS — I10 PRIMARY HYPERTENSION: ICD-10-CM

## 2023-11-09 LAB
ALBUMIN SERPL BCP-MCNC: 4.3 G/DL (ref 3.4–5)
ALP SERPL-CCNC: 86 U/L (ref 33–136)
ALT SERPL W P-5'-P-CCNC: 12 U/L (ref 7–45)
ANION GAP SERPL CALC-SCNC: 13 MMOL/L (ref 10–20)
AST SERPL W P-5'-P-CCNC: 15 U/L (ref 9–39)
BILIRUB SERPL-MCNC: 1.7 MG/DL (ref 0–1.2)
BUN SERPL-MCNC: 17 MG/DL (ref 6–23)
CALCIUM SERPL-MCNC: 9.4 MG/DL (ref 8.6–10.6)
CHLORIDE SERPL-SCNC: 103 MMOL/L (ref 98–107)
CHOLEST SERPL-MCNC: 155 MG/DL (ref 0–199)
CHOLESTEROL/HDL RATIO: 3.9
CO2 SERPL-SCNC: 28 MMOL/L (ref 21–32)
CREAT SERPL-MCNC: 0.94 MG/DL (ref 0.5–1.05)
ERYTHROCYTE [DISTWIDTH] IN BLOOD BY AUTOMATED COUNT: 14.9 % (ref 11.5–14.5)
GFR SERPL CREATININE-BSD FRML MDRD: 65 ML/MIN/1.73M*2
GLUCOSE SERPL-MCNC: 204 MG/DL (ref 74–99)
HBA1C MFR BLD: 6.9 % (ref 4.2–6.5)
HCT VFR BLD AUTO: 38.6 % (ref 36–46)
HDLC SERPL-MCNC: 39.5 MG/DL
HGB BLD-MCNC: 12.5 G/DL (ref 12–16)
LDLC SERPL CALC-MCNC: 85 MG/DL
MCH RBC QN AUTO: 28.6 PG (ref 26–34)
MCHC RBC AUTO-ENTMCNC: 32.4 G/DL (ref 32–36)
MCV RBC AUTO: 88 FL (ref 80–100)
NON HDL CHOLESTEROL: 116 MG/DL (ref 0–149)
NRBC BLD-RTO: 0 /100 WBCS (ref 0–0)
PLATELET # BLD AUTO: 274 X10*3/UL (ref 150–450)
POC FINGERSTICK BLOOD GLUCOSE: 217 MG/DL (ref 70–100)
POC INR: 1.6 (ref 0.9–1.1)
POC PROTHROMBIN TIME: 10.2 (ref 9.3–12.5)
POTASSIUM SERPL-SCNC: 4.1 MMOL/L (ref 3.5–5.3)
PROT SERPL-MCNC: 7.1 G/DL (ref 6.4–8.2)
RBC # BLD AUTO: 4.37 X10*6/UL (ref 4–5.2)
SODIUM SERPL-SCNC: 140 MMOL/L (ref 136–145)
TRIGL SERPL-MCNC: 151 MG/DL (ref 0–149)
TSH SERPL-ACNC: 3.78 MIU/L (ref 0.44–3.98)
VLDL: 30 MG/DL (ref 0–40)
WBC # BLD AUTO: 5.9 X10*3/UL (ref 4.4–11.3)

## 2023-11-09 PROCEDURE — 80053 COMPREHEN METABOLIC PANEL: CPT

## 2023-11-09 PROCEDURE — 1160F RVW MEDS BY RX/DR IN RCRD: CPT | Performed by: FAMILY MEDICINE

## 2023-11-09 PROCEDURE — 36415 COLL VENOUS BLD VENIPUNCTURE: CPT

## 2023-11-09 PROCEDURE — 1126F AMNT PAIN NOTED NONE PRSNT: CPT | Performed by: FAMILY MEDICINE

## 2023-11-09 PROCEDURE — 1036F TOBACCO NON-USER: CPT | Performed by: FAMILY MEDICINE

## 2023-11-09 PROCEDURE — 83036 HEMOGLOBIN GLYCOSYLATED A1C: CPT | Mod: CLIA WAIVED TEST | Performed by: FAMILY MEDICINE

## 2023-11-09 PROCEDURE — 3008F BODY MASS INDEX DOCD: CPT | Performed by: FAMILY MEDICINE

## 2023-11-09 PROCEDURE — 82962 GLUCOSE BLOOD TEST: CPT | Performed by: FAMILY MEDICINE

## 2023-11-09 PROCEDURE — 99214 OFFICE O/P EST MOD 30 MIN: CPT | Performed by: FAMILY MEDICINE

## 2023-11-09 PROCEDURE — 3044F HG A1C LEVEL LT 7.0%: CPT | Performed by: FAMILY MEDICINE

## 2023-11-09 PROCEDURE — 85610 PROTHROMBIN TIME: CPT | Performed by: FAMILY MEDICINE

## 2023-11-09 PROCEDURE — 84443 ASSAY THYROID STIM HORMONE: CPT

## 2023-11-09 PROCEDURE — 85027 COMPLETE CBC AUTOMATED: CPT

## 2023-11-09 PROCEDURE — 3077F SYST BP >= 140 MM HG: CPT | Performed by: FAMILY MEDICINE

## 2023-11-09 PROCEDURE — 1159F MED LIST DOCD IN RCRD: CPT | Performed by: FAMILY MEDICINE

## 2023-11-09 PROCEDURE — 3079F DIAST BP 80-89 MM HG: CPT | Performed by: FAMILY MEDICINE

## 2023-11-09 PROCEDURE — 80061 LIPID PANEL: CPT

## 2023-11-09 ASSESSMENT — PATIENT HEALTH QUESTIONNAIRE - PHQ9
2. FEELING DOWN, DEPRESSED OR HOPELESS: SEVERAL DAYS
SUM OF ALL RESPONSES TO PHQ9 QUESTIONS 1 AND 2: 1
1. LITTLE INTEREST OR PLEASURE IN DOING THINGS: NOT AT ALL

## 2023-11-09 ASSESSMENT — PAIN SCALES - GENERAL: PAINLEVEL: 0-NO PAIN

## 2023-11-09 NOTE — PROGRESS NOTES
Subjective   Genet Wilks is a 73 y.o. female who presents for Follow-up (Follow up visit for Coumadin level).    HPI  : Patient is a 73-year-old diabetic female who is in for recheck on her blood sugar and A1c and also her blood counts and lipids.  Patient has a mitral valve replacement and is on Coumadin for life.  Apparently 2 weeks ago her INR was up to 7 and she was told to quit taking her Coumadin for 4 days.  She was recommended to go to the hospital but patient did not want to be admitted.  Today we will check her blood sugar, A1c and also INR.  She also has a long history of asthmatic bronchitis which has been stable at this time and she does use a home nebulizer machine.  She also follows closely with cardiology due to chronic heart failure and moderate aortic regurgitation.      Objective  : ROS : 10 systems were reviewed and the information is included in the HPI and no additional review of systems is indicated.    Physical Exam  Vitals and nursing note reviewed.   Constitutional:       Appearance: Normal appearance. She is obese.      Comments: Patient is alert and oriented x3.   No acute distress   HENT:      Head: Normocephalic.      Right Ear: Tympanic membrane and external ear normal.      Left Ear: Tympanic membrane and external ear normal.      Ears:      Comments: Ears are patent bilaterally and TMs are clear.     Nose: Nose normal.      Mouth/Throat:      Mouth: Mucous membranes are moist.      Pharynx: Oropharynx is clear.      Comments: Mouth is moist, tongue is midline.  No posterior pharyngeal erythema.  Eyes:      Extraocular Movements: Extraocular movements intact.      Conjunctiva/sclera: Conjunctivae normal.      Pupils: Pupils are equal, round, and reactive to light.      Comments: No visual disturbance, does have her eyes examined once a year.   Neck:      Comments: Patient does have some restriction of motion cervical spine due to arthritis, stress and tension.  No carotid bruits,  no thyromegaly, no cervical adenopathy.    Cardiovascular:      Rate and Rhythm: Normal rate and regular rhythm.      Pulses: Normal pulses.      Heart sounds: Normal heart sounds.      Comments: Mid systolic click  from previous  Mitral valve replacement  surgery  done 2001.  Patient does follow closely with cardiology and had a recent echocardiogram and does have 1 on a yearly basis.  Patient denies chest pain and no palpitations.  Heart rhythm is stable S1 and S2 are noted, no ectopics.  Pulmonary:      Effort: Pulmonary effort is normal.      Breath sounds: Wheezing present.      Comments: Long history of Asthma ,  and allergies.  Patient does have few scattered expiratory wheezes to auscultation and she does use her nebulizer machine on a daily basis.  Abdominal:      General: Bowel sounds are normal.      Palpations: Abdomen is soft.      Comments: Abdomen is soft and obese and difficult to evaluate due to obesity.  She denies any abdominal pain or flank tenderness.  No masses are noted, no rebound tenderness and no guarding.   Genitourinary:     Comments: Patient denies dysuria, no hematuria, denies flank pain.   Occasional nocturia.  Musculoskeletal:      Cervical back: Normal range of motion.      Right lower leg: Edema present.      Left lower leg: Edema present.      Comments: Bilateral knee pains  Had left knee surgery in 1992.   Age-related arthritis in the joints.  Restriction of motion cervical and lumbar spines due to arthritis,  and , muscle spasm.   Mild ankle edema  but better then previous.   Skin:     General: Skin is warm and dry.      Coloration: Skin is pale.      Comments: Patient does have dry pale skin and is usually somewhat anemic.  I am checking her hemoglobin and she will be notified of the results.   Neurological:      General: No focal deficit present.      Mental Status: She is alert and oriented to person, place, and time. Mental status is at baseline.      Coordination:  Coordination abnormal.      Comments: No focal neurosensory deficits are noted.  Mild diabetic neuropathy in the legs and feet.  Coordination is somewhat restricted due to her obesity and knee arthritis.  Normal muscle strength upper and lower extremities.   Psychiatric:         Behavior: Behavior normal.         Thought Content: Thought content normal.         Judgment: Judgment normal.      Comments: Patient does have anxiety concerning her health issues.  She also has some stress at home since her son and his wife and children live there.  Patient has normal mood and affect.  Thought content and judgment are stable.  No signs of vascular dementia.  Behavior is normal.     PLAN : Patient is a 73-year-old diabetic female who was evaluated today for complete blood work, blood sugar and A1c and also Coumadin INR.  Blood sugar was elevated at 217 and the A1c was stable at 6.9%.  Her INR has improved to 1.6 and she is back on her normal dose of Coumadin.  She does have a history of mitral valve replacement approximately 21 years ago and has been stable.  She also follows with cardiology for a yearly echocardiogram.  She has a history of asthma which is currently stable and she uses a home nebulizer machine.  She is afraid to have a flu shot since she had gotten sick several years ago with a flu vaccine.  Patient has not had COVID and does not want a COVID shot.  She will be notified of all her blood results and further recommendations will be made at that time.  She will follow-up as needed in 4 to 6 months.    Problem List Items Addressed This Visit       Diabetes mellitus without complication (CMS/HCC)    Relevant Orders    CBC    Comprehensive Metabolic Panel    Lipid Panel    POCT fingerstick glucose manually resulted    POCT Glycosylated Hemoglobin (HGB A1C) docked device    Thyroid Stimulating Hormone    Anticoagulation management encounter    Relevant Orders    POCT INR manually resulted    Atrial fibrillation  (CMS/HCC)    Relevant Orders    POCT INR manually resulted    COPD (chronic obstructive pulmonary disease) (CMS/HCC)    Relevant Orders    CBC    Comprehensive Metabolic Panel    Lipid Panel    POCT fingerstick glucose manually resulted    POCT Glycosylated Hemoglobin (HGB A1C) docked device    Thyroid Stimulating Hormone    Hypothyroidism    Relevant Orders    CBC    Comprehensive Metabolic Panel    Lipid Panel    POCT fingerstick glucose manually resulted    POCT Glycosylated Hemoglobin (HGB A1C) docked device    Thyroid Stimulating Hormone    Hyperlipidemia    Relevant Orders    CBC    Comprehensive Metabolic Panel    Lipid Panel    POCT fingerstick glucose manually resulted    POCT Glycosylated Hemoglobin (HGB A1C) docked device    Thyroid Stimulating Hormone    Hypertension    Relevant Orders    CBC    Comprehensive Metabolic Panel    Lipid Panel    POCT fingerstick glucose manually resulted    POCT Glycosylated Hemoglobin (HGB A1C) docked device    Thyroid Stimulating Hormone            Mitesh Simms DO

## 2023-11-10 NOTE — RESULT ENCOUNTER NOTE
Kidney and liver function are stable          the bilirubin test was up a little bit      and that could be from stress        cholesterol is normal at 155        triglycerides are just slightly borderline at 151        and they should be under 150         Red and white blood cell counts are normal    no anemia      thyroid is stable      blood work is stable just try to watch the diabetes and keep the sugar under control        and lower the stress

## 2023-11-14 ENCOUNTER — APPOINTMENT (OUTPATIENT)
Dept: PHARMACY | Facility: CLINIC | Age: 73
End: 2023-11-14
Payer: MEDICARE

## 2023-11-15 ENCOUNTER — ANTICOAGULATION - WARFARIN VISIT (OUTPATIENT)
Dept: PHARMACY | Facility: CLINIC | Age: 73
End: 2023-11-15
Payer: MEDICARE

## 2023-11-15 DIAGNOSIS — Z95.2 MECHANICAL HEART VALVE PRESENT: Primary | ICD-10-CM

## 2023-11-15 LAB
POC INR: 4.5
POC PROTHROMBIN TIME: NORMAL

## 2023-11-15 PROCEDURE — 85610 PROTHROMBIN TIME: CPT | Mod: QW

## 2023-11-15 PROCEDURE — 99212 OFFICE O/P EST SF 10 MIN: CPT

## 2023-11-15 NOTE — PROGRESS NOTES
Coumadin Clinic Visit Note    Patient verified warfarin dose  No missed doses  No unusual bruising or bleeding  No changes to medications  Consistent dietary green intake  No anticipated procedures at this time  INR Supratherapeutic today at 4.5  Instructed patient to hold for both today (11/15/23) and tomorrow (11/16/23). Adjusted maintenance dose to 20mg weekly down from 22.5mg weekly.   Patient is unsure why number is fluctuating so much as it was just 1.6 at the Dr's office on 11/9/23. Reports no new medications, antibiotics, Tylenol intake, or diet change.  Next appointment 2 weeks - 11/29/23      Pacheco Abdullahi, RohithD

## 2023-11-27 ENCOUNTER — ANTICOAGULATION - WARFARIN VISIT (OUTPATIENT)
Dept: PHARMACY | Facility: CLINIC | Age: 73
End: 2023-11-27
Payer: MEDICARE

## 2023-11-27 DIAGNOSIS — Z95.2 MECHANICAL HEART VALVE PRESENT: Primary | ICD-10-CM

## 2023-11-27 LAB
POC INR: 2.8
POC PROTHROMBIN TIME: NORMAL

## 2023-11-27 PROCEDURE — 99212 OFFICE O/P EST SF 10 MIN: CPT | Performed by: PHARMACIST

## 2023-11-27 PROCEDURE — 85610 PROTHROMBIN TIME: CPT | Mod: QW | Performed by: PHARMACIST

## 2023-11-27 NOTE — PROGRESS NOTES
"Verified current dose with pt and that shed held 2 doses at last visit.    No new meds or med changes since last visit.  Pt denies unusual bleed/bruise.  Pt needs aortic valve repair.  There is no date yet; pt would like to wait until after the holidays.  Pt is scheduled on Jan 17 for her teeth extracted (8 teeth)  Continue same dose and check again in  3 weeks.    Pt will need another lovenox bridge.    Need to get pt weight next visit - she said she has lost weight over the last few months and I forgot to weigh her - sorry.  Scr = 0.94 mg/dl and Plt = 274 from 11/9/23  Looks like she will have 6 teeth pulled.  She did a lovenox bridge in March 2023 and warfarin held 5 days prior (this was also a tooth extraction)  I started the bridge below.  Just need weight and call in script.      Enoxaparin (Lovenox) \"Bridging for Warfarin (Coumadin/Jantoven)  Enoxaparin (Lovenox) Dose and Frequency:                                   Pre-Op and Post-Op Anticoagulation Instructions            Day          Date               Instructions             -7               -6               -5 1/12  Fri Do not take Warfarin             -4 1/13  Sat Do not take Warfarin  Inject Enoxaparin (Lovenox) in the morning and evening (12 hours apart)             -3 1/14  Sun Do not take Warfarin  Inject Enoxaparin (Lovenox) in the morning and evening (12 hours apart)             -2 1/15  Mon Do not take Warfarin  Inject Enoxaparin (Lovenox) in the morning and evening (12 hours apart)             -1  DAY BEFORE PROCEDURE 1/16  Tues Do not take Warfarin  Only take Enoxaparin (Lovenox) dose in the morning  Do not take evening Enoxaparin (Lovenox)        DAY OF         PROCEDURE 1/17 Wed Do not take Enoxaparin (Lovenox) today  Take your usual dose of warfarin in the evening            +1 1/18  Thurs Take you usual dose of warfarin  Inject Enoxaparin (Lovenox) in the morning and evening (12 hours apart)            +2 1/19 Fri Come into clinic for INR " check            +3              +4              +5

## 2023-12-11 ENCOUNTER — OFFICE VISIT (OUTPATIENT)
Dept: CARDIOLOGY | Facility: CLINIC | Age: 73
End: 2023-12-11
Payer: MEDICARE

## 2023-12-11 VITALS
DIASTOLIC BLOOD PRESSURE: 78 MMHG | HEART RATE: 77 BPM | HEIGHT: 62 IN | BODY MASS INDEX: 40.48 KG/M2 | OXYGEN SATURATION: 97 % | SYSTOLIC BLOOD PRESSURE: 124 MMHG | WEIGHT: 220 LBS

## 2023-12-11 DIAGNOSIS — I10 PRIMARY HYPERTENSION: ICD-10-CM

## 2023-12-11 DIAGNOSIS — E66.9 CLASS 1 OBESITY WITHOUT SERIOUS COMORBIDITY WITH BODY MASS INDEX (BMI) OF 31.0 TO 31.9 IN ADULT, UNSPECIFIED OBESITY TYPE: ICD-10-CM

## 2023-12-11 DIAGNOSIS — J43.2 CENTRILOBULAR EMPHYSEMA (MULTI): ICD-10-CM

## 2023-12-11 DIAGNOSIS — I50.32 CHRONIC HEART FAILURE WITH PRESERVED EJECTION FRACTION (MULTI): ICD-10-CM

## 2023-12-11 DIAGNOSIS — Z95.2 MECHANICAL HEART VALVE PRESENT: Primary | ICD-10-CM

## 2023-12-11 DIAGNOSIS — E11.9 TYPE 2 DIABETES MELLITUS WITHOUT COMPLICATION, WITHOUT LONG-TERM CURRENT USE OF INSULIN (MULTI): ICD-10-CM

## 2023-12-11 DIAGNOSIS — E66.01 MORBID (SEVERE) OBESITY DUE TO EXCESS CALORIES (MULTI): ICD-10-CM

## 2023-12-11 DIAGNOSIS — I48.11 LONGSTANDING PERSISTENT ATRIAL FIBRILLATION (MULTI): ICD-10-CM

## 2023-12-11 DIAGNOSIS — E78.00 PURE HYPERCHOLESTEROLEMIA: ICD-10-CM

## 2023-12-11 DIAGNOSIS — J45.20 MILD INTERMITTENT ASTHMATIC BRONCHITIS WITHOUT COMPLICATION (HHS-HCC): ICD-10-CM

## 2023-12-11 PROCEDURE — 3048F LDL-C <100 MG/DL: CPT | Performed by: INTERNAL MEDICINE

## 2023-12-11 PROCEDURE — 99214 OFFICE O/P EST MOD 30 MIN: CPT | Performed by: INTERNAL MEDICINE

## 2023-12-11 PROCEDURE — 1036F TOBACCO NON-USER: CPT | Performed by: INTERNAL MEDICINE

## 2023-12-11 PROCEDURE — 1159F MED LIST DOCD IN RCRD: CPT | Performed by: INTERNAL MEDICINE

## 2023-12-11 PROCEDURE — 1126F AMNT PAIN NOTED NONE PRSNT: CPT | Performed by: INTERNAL MEDICINE

## 2023-12-11 PROCEDURE — 3008F BODY MASS INDEX DOCD: CPT | Performed by: INTERNAL MEDICINE

## 2023-12-11 PROCEDURE — 3078F DIAST BP <80 MM HG: CPT | Performed by: INTERNAL MEDICINE

## 2023-12-11 PROCEDURE — 93000 ELECTROCARDIOGRAM COMPLETE: CPT | Performed by: INTERNAL MEDICINE

## 2023-12-11 PROCEDURE — 1160F RVW MEDS BY RX/DR IN RCRD: CPT | Performed by: INTERNAL MEDICINE

## 2023-12-11 PROCEDURE — 3074F SYST BP LT 130 MM HG: CPT | Performed by: INTERNAL MEDICINE

## 2023-12-11 PROCEDURE — 3044F HG A1C LEVEL LT 7.0%: CPT | Performed by: INTERNAL MEDICINE

## 2023-12-11 NOTE — PROGRESS NOTES
"  Subjective  Genet Wilks  is a 73 y.o. year old female who presents for Afib and S/P MVR when she gets up her BP drops and she gets dizzy    Blood pressure 124/78, pulse 77, height 1.575 m (5' 2\"), weight 99.8 kg (220 lb), SpO2 97 %.   Mushroom, Penicillins, Penicillin g, and Simvastatin  Past Medical History:   Diagnosis Date    Long term (current) use of anticoagulants     Long-term (current) use of anticoagulants    Old myocardial infarction     History of non-ST elevation myocardial infarction (NSTEMI)    Personal history of (corrected) congenital malformations of heart and circulatory system     History of congenital anomaly of heart    Personal history of other diseases of the circulatory system     History of rheumatic fever    Personal history of other diseases of the circulatory system     History of mitral valve prolapse    Personal history of other diseases of the circulatory system     History of atrial fibrillation    Personal history of other diseases of the musculoskeletal system and connective tissue     History of back pain    Personal history of other diseases of the nervous system and sense organs     History of carpal tunnel syndrome    Personal history of other diseases of the respiratory system     History of asthma    Personal history of other endocrine, nutritional and metabolic disease     History of morbid obesity    Personal history of other endocrine, nutritional and metabolic disease     History of hypothyroidism    Personal history of other endocrine, nutritional and metabolic disease     History of hypercholesterolemia    Personal history of other specified conditions     History of chest pain    Presence of other heart-valve replacement     Heart valve replaced by other means    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    Mechanical heart valve present     Past Surgical History:   Procedure Laterality Date    CARDIAC CATHETERIZATION  06/15/2018    Cardiac Cath Procedure " Outcome:    CARDIAC SURGERY  11/13/2014    Heart Surgery    KNEE SURGERY  06/15/2018    Knee Surgery    OTHER SURGICAL HISTORY  11/13/2014    Surgery    OTHER SURGICAL HISTORY  11/13/2014    Colposcopy Cervix With Biopsy(S)    TONSILLECTOMY  11/13/2014    Tonsillectomy    TUBAL LIGATION  11/13/2014    Tubal Ligation     Family History   Problem Relation Name Age of Onset    Diabetes Mother      Stroke Father      Hypertension Father      Diabetes Sister      Breast cancer Sister      Throat cancer Brother      Diabetes Brother      Other (S/P CABG x3) Brother      Cancer Other sibling      @SOC    Current Outpatient Medications   Medication Sig Dispense Refill    albuterol 0.63 mg/3 mL nebulizer solution Take 3 mL (0.63 mg) by nebulization 3 times a day. 75 mL 3    albuterol 90 mcg/actuation inhaler Inhale.      amLODIPine (Norvasc) 5 mg tablet Take 1 tablet (5 mg) by mouth once daily. 30 tablet 5    aspirin 81 mg EC tablet Take by mouth.      atorvastatin (Lipitor) 20 mg tablet Take 1 tablet (20 mg) by mouth once daily at bedtime.      benzonatate (Tessalon) 100 mg capsule Take by mouth.      cholecalciferol (Vitamin D-3) 50 MCG (2000 UT) tablet Take by mouth.      furosemide (Lasix) 40 mg tablet Take 1 tablet (40 mg) by mouth 2 times a day.      gabapentin (Neurontin) 100 mg capsule Take by mouth.      glimepiride (Amaryl) 2 mg tablet TAKE 1 TABLET BY MOUTH ONCE DAILY AS DIRECTED 90 tablet 0    levothyroxine (Synthroid, Levoxyl) 75 mcg tablet Take 1 tablet (75 mcg) by mouth once daily. as directed      metoprolol succinate XL (Toprol-XL) 25 mg 24 hr tablet Take 1 tablet (25 mg) by mouth once daily.      OneTouch Ultra Test strip use 1 strip to check glucose once daily      oxygen (O2) gas therapy Inhale.      warfarin (Coumadin) 5 mg tablet TAKE 1 FULL TABLET BY MOUTH ON MONDAYS AND FRIDAYS, THEN TAKE HALF A TABLET ALL OTHER DAYS OR AS DIRECTED BY COUMADIN CLINIC.       No current facility-administered medications  for this visit.        ROS  Review of Systems   All other systems reviewed and are negative.      Physical Exam  Physical Exam  Constitutional:       Appearance: Normal appearance. She is obese.   Eyes:      Extraocular Movements: Extraocular movements intact.      Pupils: Pupils are equal, round, and reactive to light.   Cardiovascular:      Rate and Rhythm: Normal rate. Rhythm irregular.      Comments: Crisp valve sounds  Abdominal:      Palpations: Abdomen is soft.   Musculoskeletal:      Right lower le+ Pitting Edema present.      Left lower le+ Pitting Edema present.   Neurological:      General: No focal deficit present.      Mental Status: She is alert and oriented to person, place, and time.          EKG  Encounter Date: 23   ECG 12 Lead    Narrative    Atrial fibrillation at 76/min., ST-T abn       Problem List Items Addressed This Visit       Primary hypertension    Relevant Orders    ECG 12 Lead (Completed)    Morbid (severe) obesity due to excess calories (CMS/HCC)    (HFpEF) heart failure with preserved ejection fraction (CMS/HCC)    Atrial fibrillation (CMS/HCC)    Relevant Orders    Follow Up In Cardiology    COPD (chronic obstructive pulmonary disease) (CMS/HCC)    Mechanical heart valve present - Primary     MVP/MR S/P mechanical MVR 01 [facility] Dr. Sanchez; 3/13/23 echocardiogram normally functioning mechanical MVR, aortic sclerosis with moderate AI, LVEF = 55-60%, Tr with normal RVSP         Relevant Orders    Follow Up In Cardiology    Transthoracic Echo (TTE) Complete    Asthmatic bronchitis    Diabetes mellitus (CMS/HCC)    Hyperlipidemia    Obesity         Retrurn 3 months with EKG and echocardiogram      Lul Day MD

## 2023-12-17 NOTE — ASSESSMENT & PLAN NOTE
MVP/MR S/P mechanical MVR 5/9/01 [facility] Dr. Sanchez; 3/13/23 echocardiogram normally functioning mechanical MVR, aortic sclerosis with moderate AI, LVEF = 55-60%, Tr with normal RVSP

## 2023-12-18 ENCOUNTER — ANTICOAGULATION - WARFARIN VISIT (OUTPATIENT)
Dept: PHARMACY | Facility: CLINIC | Age: 73
End: 2023-12-18
Payer: MEDICARE

## 2023-12-18 DIAGNOSIS — Z95.2 MECHANICAL HEART VALVE PRESENT: Primary | ICD-10-CM

## 2023-12-18 LAB
POC INR: 2.3
POC PROTHROMBIN TIME: NORMAL

## 2023-12-18 PROCEDURE — 99212 OFFICE O/P EST SF 10 MIN: CPT | Performed by: PHARMACIST

## 2023-12-18 PROCEDURE — 85610 PROTHROMBIN TIME: CPT | Performed by: PHARMACIST

## 2023-12-18 NOTE — PROGRESS NOTES
Verified current dose with pt.    No new meds or med changes since last visit.  Pt denies unusual bleed/bruise.  Still planning on dental procedure of Jan 17th - she will ask her dentist if she can have 3 teeth remoed and then another 3 teeth - this way she would not need to use lovenox.    inr = 2.3 - pt is not sure if she took dose on Sat?  She also had a couple salads ([facility]) this past week - more than usual.  Continue same dose and check again in 5 weeks.  Boost 7.5 mg today (Mon)  Weekly dose same for now.    Check in 3 weeks

## 2023-12-21 DIAGNOSIS — Z79.899 HIGH RISK MEDICATION USE: Primary | ICD-10-CM

## 2023-12-22 DIAGNOSIS — Z95.2 HISTORY OF PROSTHETIC HEART VALVE: Primary | ICD-10-CM

## 2023-12-22 RX ORDER — WARFARIN SODIUM 5 MG/1
TABLET ORAL
Qty: 55 TABLET | Refills: 1 | Status: SHIPPED | OUTPATIENT
Start: 2023-12-22 | End: 2024-12-21

## 2023-12-28 ENCOUNTER — TELEPHONE (OUTPATIENT)
Dept: PRIMARY CARE | Facility: CLINIC | Age: 73
End: 2023-12-28
Payer: MEDICARE

## 2023-12-28 DIAGNOSIS — J45.41 MODERATE PERSISTENT ASTHMATIC BRONCHITIS WITH ACUTE EXACERBATION (HHS-HCC): Primary | ICD-10-CM

## 2023-12-28 RX ORDER — DOXYCYCLINE 100 MG/1
100 CAPSULE ORAL 2 TIMES DAILY
Qty: 14 CAPSULE | Refills: 0 | Status: SHIPPED | OUTPATIENT
Start: 2023-12-28 | End: 2023-12-29 | Stop reason: SDUPTHER

## 2023-12-29 DIAGNOSIS — J45.41 MODERATE PERSISTENT ASTHMATIC BRONCHITIS WITH ACUTE EXACERBATION (HHS-HCC): ICD-10-CM

## 2023-12-29 RX ORDER — DOXYCYCLINE 100 MG/1
100 CAPSULE ORAL 2 TIMES DAILY
Qty: 20 CAPSULE | Refills: 0 | Status: SHIPPED | OUTPATIENT
Start: 2023-12-29 | End: 2024-01-08

## 2023-12-29 NOTE — TELEPHONE ENCOUNTER
Pharmacy called and said the doxy was sent in but the directions/sig and dispense is not lining up.   Please clarify and send new rx    Ex:    Sig says= Patient Sig: Take 1 capsule (100 mg) by mouth 2 times a day for 10 days. Take with at least 8 ounces (large glass) of water, do not lie down for 30 minutes after    Dispense: 14 capsules

## 2024-01-02 DIAGNOSIS — E13.9 DIABETES 1.5, MANAGED AS TYPE 2 (MULTI): ICD-10-CM

## 2024-01-02 DIAGNOSIS — J45.41 MODERATE PERSISTENT ASTHMATIC BRONCHITIS WITH ACUTE EXACERBATION (HHS-HCC): ICD-10-CM

## 2024-01-02 DIAGNOSIS — E03.9 HYPOTHYROIDISM, UNSPECIFIED TYPE: Primary | ICD-10-CM

## 2024-01-02 RX ORDER — LEVOTHYROXINE SODIUM 75 UG/1
75 TABLET ORAL DAILY
Qty: 90 TABLET | Refills: 0 | Status: SHIPPED | OUTPATIENT
Start: 2024-01-02

## 2024-01-02 RX ORDER — GLIMEPIRIDE 2 MG/1
TABLET ORAL
Qty: 90 TABLET | Refills: 0 | Status: SHIPPED | OUTPATIENT
Start: 2024-01-02 | End: 2024-01-03 | Stop reason: SDUPTHER

## 2024-01-02 RX ORDER — ALBUTEROL SULFATE 0.63 MG/3ML
0.63 SOLUTION RESPIRATORY (INHALATION)
Qty: 270 ML | Refills: 3 | Status: SHIPPED | OUTPATIENT
Start: 2024-01-02

## 2024-01-03 DIAGNOSIS — E13.9 DIABETES 1.5, MANAGED AS TYPE 2 (MULTI): ICD-10-CM

## 2024-01-04 RX ORDER — GLIMEPIRIDE 2 MG/1
2 TABLET ORAL DAILY
Qty: 90 TABLET | Refills: 1 | Status: SHIPPED | OUTPATIENT
Start: 2024-01-04

## 2024-01-08 ENCOUNTER — ANTICOAGULATION - WARFARIN VISIT (OUTPATIENT)
Dept: PHARMACY | Facility: CLINIC | Age: 74
End: 2024-01-08
Payer: MEDICARE

## 2024-01-08 ENCOUNTER — CLINICAL SUPPORT (OUTPATIENT)
Dept: PHARMACY | Facility: CLINIC | Age: 74
End: 2024-01-08
Payer: MEDICARE

## 2024-01-08 DIAGNOSIS — Z95.2 MECHANICAL HEART VALVE PRESENT: Primary | ICD-10-CM

## 2024-01-08 LAB
POC INR: 3.7
POC PROTHROMBIN TIME: NORMAL

## 2024-01-08 PROCEDURE — 85610 PROTHROMBIN TIME: CPT

## 2024-01-08 PROCEDURE — 99212 OFFICE O/P EST SF 10 MIN: CPT

## 2024-01-08 NOTE — PROGRESS NOTES
No bleeding or unusual bruising.  Medications and doses verified.  Patient was on antibiotic for 10 days for URI, last dose was yesterday. Does not know name.  Pt states she is scheduled for a tooth extraction on 1/17/24 but has not confirmed appointment and how many teeth will be extracted. Will notify clinic.  INR=3.7  Plan: Hold dose today, then continue same weekly dose.  Follow up INR check in 3 weeks.

## 2024-01-12 ENCOUNTER — ANTICOAGULATION - WARFARIN VISIT (OUTPATIENT)
Dept: PHARMACY | Facility: CLINIC | Age: 74
End: 2024-01-12
Payer: MEDICARE

## 2024-01-12 DIAGNOSIS — Z95.2 MECHANICAL HEART VALVE PRESENT: Primary | ICD-10-CM

## 2024-01-12 LAB
POC INR: 2.5
POC PROTHROMBIN TIME: NORMAL

## 2024-01-12 PROCEDURE — 85610 PROTHROMBIN TIME: CPT | Mod: QW | Performed by: PHARMACIST

## 2024-01-12 PROCEDURE — 99212 OFFICE O/P EST SF 10 MIN: CPT | Performed by: PHARMACIST

## 2024-01-12 NOTE — PROGRESS NOTES
"Verified current dose with pt and that pt held 1 dose at last visit.    No new meds or med changes since last visit.  Pt denies unusual bleed/bruise.  Pt having 6 teeth extracted on Jan 17th.    Inr = 2.5  Pt weight = 213 lb (97 kg) and est crcl = 45 ml/min; will need lovenox 100 mg q 12h  Continue same dose and check Fri jan 19th  Went over the lovenox bridge with pt.  She has used lovenox in the past.  Her daughter-in-law is an RN and she will be administering the injections.    Enoxaparin (Lovenox) \"Bridging for Warfarin (Coumadin/Jantoven)       Enoxaparin (Lovenox) Dose and Frequency:  Lovenox 100 mg subcutaneously every 12 hours                                 Pre-Op and Post-Op Anticoagulation Instructions            Day          Date               Instructions             -7                 -6                 -5 1/12  Fri Do not take Warfarin             -4 1/13  Sat Do not take Warfarin  Inject Enoxaparin (Lovenox) in the morning and evening (12 hours apart)             -3 1/14  Sun Do not take Warfarin  Inject Enoxaparin (Lovenox) in the morning and evening (12 hours apart)             -2 1/15  Mon Do not take Warfarin  Inject Enoxaparin (Lovenox) in the morning and evening (12 hours apart)             -1  DAY BEFORE PROCEDURE 1/16  Tues Do not take Warfarin  Only take Enoxaparin (Lovenox) dose in the morning  Do not take evening Enoxaparin (Lovenox)        DAY OF         PROCEDURE 1/17 Wed Do not take Enoxaparin (Lovenox) today  Take your usual dose of warfarin in the evening            +1 1/18 Thurs Take you usual dose of warfarin  Inject Enoxaparin (Lovenox) in the morning and evening (12 hours apart)            +2 1/19 Fri Come into clinic for INR check            +3                +4                +5         Left VM [facility] - [phone].  Lovenox 100 mg Syringe.  Inject 100 mg subcutaneous Q12 hours.  #9 syringes.  No refill.  Dr. Day  "

## 2024-01-17 ENCOUNTER — TELEPHONE (OUTPATIENT)
Dept: PHARMACY | Facility: CLINIC | Age: 74
End: 2024-01-17
Payer: MEDICARE

## 2024-01-17 NOTE — TELEPHONE ENCOUNTER
Patient called stating that she had some dental work today, but she did not have her teeth pulled today like her original plan.     Patient to take warfarin 5mg today only (booster dose), and to follow bridging instruction sheet. Keep appt for POC INR appt on Friday.     Of note, patient stated that her dentist will not have her ready for her tooth extractions (6 teeth) at 3 months.     Radha Burdick, RohithD, BCPS   1/17/2024 2:26 PM

## 2024-01-19 ENCOUNTER — CLINICAL SUPPORT (OUTPATIENT)
Dept: PHARMACY | Facility: CLINIC | Age: 74
End: 2024-01-19
Payer: MEDICARE

## 2024-01-19 ENCOUNTER — ANTICOAGULATION - WARFARIN VISIT (OUTPATIENT)
Dept: PHARMACY | Facility: CLINIC | Age: 74
End: 2024-01-19
Payer: MEDICARE

## 2024-01-19 DIAGNOSIS — Z95.2 MECHANICAL HEART VALVE PRESENT: ICD-10-CM

## 2024-01-19 DIAGNOSIS — Z95.2 HISTORY OF PROSTHETIC HEART VALVE: Primary | ICD-10-CM

## 2024-01-19 DIAGNOSIS — Z95.2 MECHANICAL HEART VALVE PRESENT: Primary | ICD-10-CM

## 2024-01-19 LAB
POC INR: 1.3
POC PROTHROMBIN TIME: NORMAL

## 2024-01-19 PROCEDURE — 99212 OFFICE O/P EST SF 10 MIN: CPT | Performed by: PHARMACIST

## 2024-01-19 PROCEDURE — 85610 PROTHROMBIN TIME: CPT | Mod: QW | Performed by: PHARMACIST

## 2024-01-19 RX ORDER — ENOXAPARIN SODIUM 100 MG/ML
100 INJECTION SUBCUTANEOUS 2 TIMES DAILY
COMMUNITY
End: 2024-01-19 | Stop reason: SDUPTHER

## 2024-01-19 RX ORDER — ENOXAPARIN SODIUM 100 MG/ML
INJECTION SUBCUTANEOUS
Qty: 3 EACH | Refills: 0 | Status: SHIPPED | OUTPATIENT
Start: 2024-01-19

## 2024-01-19 NOTE — PATIENT INSTRUCTIONS
Continue with Lovenox shots twice daily and follow booster dosing for warfarin over the weekend. We want your INR to be therapeutic before we can stop Lovenox shots.

## 2024-01-19 NOTE — PROGRESS NOTES
Genet Wilks is a 73 y.o. female with history of mechanical heart valve replacement who presents today for anticoagulation monitoring and adjustment.  INR 1.3 is sub-therapeutic for this patient (goal range 2.5-3.5) and is reflective of 22.5 mg TWD  Patient verifies current dosing regimen, patient able to verbally recall dose - this is a slight increase in her maintenance dose of warfarin 20mg TWD - patient was off her warfarin due to bridging   Patient reports 5  missed doses since last INR- due to holding   Last INR 2.5 on 1/12/24 (1 week interval)  Patient denies s/sx clotting and/or stroke  Patient denies hematuria, epistaxis, rectal bleeding  Patient denies changes in diet, alcohol, or tobacco use  Vegetable intake consistent from week to week  Reviewed medication list and drug allergies with patient, updated any medication additions or modifications accordingly  Acetaminophen intake: no changes   Patient needs to have her teeth pulled - she was bridging and appt scheduled for 1/17/24, but procedure was cancelled   Patient was instructed to boost with warfarin 7.5mg today only, then 5mg on Saturday and Sunday, and continue with Lovenox shots, and RTC 3 days     Refill for Lovenox called into pharmacy     Radha Burdick, PharmD, BCPS   1/19/2024 1:10 PM

## 2024-01-22 ENCOUNTER — CLINICAL SUPPORT (OUTPATIENT)
Dept: PHARMACY | Facility: CLINIC | Age: 74
End: 2024-01-22
Payer: MEDICARE

## 2024-01-22 ENCOUNTER — ANTICOAGULATION - WARFARIN VISIT (OUTPATIENT)
Dept: PHARMACY | Facility: CLINIC | Age: 74
End: 2024-01-22
Payer: MEDICARE

## 2024-01-22 DIAGNOSIS — Z95.2 MECHANICAL HEART VALVE PRESENT: Primary | ICD-10-CM

## 2024-01-22 LAB
POC INR: 2.4
POC PROTHROMBIN TIME: NORMAL

## 2024-01-22 PROCEDURE — 85610 PROTHROMBIN TIME: CPT | Mod: QW | Performed by: PHARMACIST

## 2024-01-22 PROCEDURE — 99212 OFFICE O/P EST SF 10 MIN: CPT | Performed by: PHARMACIST

## 2024-01-22 RX ORDER — WARFARIN SODIUM 5 MG/1
TABLET ORAL
Qty: 60 TABLET | Refills: 0 | Status: SHIPPED | OUTPATIENT
Start: 2024-01-22

## 2024-01-22 NOTE — PROGRESS NOTES
Verified patient took warfarin as instructed   No new meds or med changes since last visit.  Pt denies unusual bleed/bruise.  No upcoming procedures.  INR: 2.4  Told patient to stop Lovenox   Plan: go back to previous dose   Continue regular dose and check again in 3 weeks.     
No

## 2024-01-29 ENCOUNTER — APPOINTMENT (OUTPATIENT)
Dept: PHARMACY | Facility: CLINIC | Age: 74
End: 2024-01-29
Payer: MEDICARE

## 2024-02-02 ENCOUNTER — ANTICOAGULATION - WARFARIN VISIT (OUTPATIENT)
Dept: PHARMACY | Facility: HOSPITAL | Age: 74
End: 2024-02-02
Payer: MEDICARE

## 2024-02-02 DIAGNOSIS — Z95.2 MECHANICAL HEART VALVE PRESENT: Primary | ICD-10-CM

## 2024-02-02 NOTE — PROGRESS NOTES
Anticoagulation Clinic appointment updated to 2/12/24 (from 1/29/24) -- updated POC INR tracker to reflect next appt date     Radha uBrdick PharmD, BCPS   2/2/2024 2:30 PM

## 2024-02-12 ENCOUNTER — ANTICOAGULATION - WARFARIN VISIT (OUTPATIENT)
Dept: PHARMACY | Facility: CLINIC | Age: 74
End: 2024-02-12
Payer: MEDICARE

## 2024-02-12 ENCOUNTER — CLINICAL SUPPORT (OUTPATIENT)
Dept: PHARMACY | Facility: CLINIC | Age: 74
End: 2024-02-12
Payer: MEDICARE

## 2024-02-12 DIAGNOSIS — Z95.2 MECHANICAL HEART VALVE PRESENT: Primary | ICD-10-CM

## 2024-02-12 LAB
POC INR: 1.9
POC PROTHROMBIN TIME: NORMAL

## 2024-02-12 PROCEDURE — 85610 PROTHROMBIN TIME: CPT | Mod: QW | Performed by: PHARMACIST

## 2024-02-12 PROCEDURE — 99212 OFFICE O/P EST SF 10 MIN: CPT | Performed by: PHARMACIST

## 2024-02-12 NOTE — PROGRESS NOTES
Verified current dose with pt.    No new meds or med changes since last visit.  Pt denies unusual bleed/bruise.  No upcoming procedures.  Inr = 1.9  Pt says she did not take dose on Sat bc she was vomiting.  Then on Sunday, she took a full 5 mg dose  Boost 7.5 mg today (Mon)  Continue same dose and check again in 4 weeks.

## 2024-02-13 DIAGNOSIS — E11.9 DIABETES MELLITUS WITHOUT COMPLICATION (MULTI): Primary | ICD-10-CM

## 2024-02-13 RX ORDER — LANCETS 33 GAUGE
EACH MISCELLANEOUS
Qty: 100 EACH | Refills: 3 | Status: SHIPPED | OUTPATIENT
Start: 2024-02-13

## 2024-02-15 DIAGNOSIS — I48.0 PAROXYSMAL ATRIAL FIBRILLATION (MULTI): Primary | ICD-10-CM

## 2024-02-24 DIAGNOSIS — M25.471 ANKLE EDEMA, BILATERAL: ICD-10-CM

## 2024-02-24 DIAGNOSIS — M25.472 ANKLE EDEMA, BILATERAL: ICD-10-CM

## 2024-02-25 RX ORDER — AMLODIPINE BESYLATE 5 MG/1
5 TABLET ORAL DAILY
Qty: 30 TABLET | Refills: 3 | Status: SHIPPED | OUTPATIENT
Start: 2024-02-25 | End: 2024-05-28 | Stop reason: SDUPTHER

## 2024-03-11 ENCOUNTER — APPOINTMENT (OUTPATIENT)
Dept: PRIMARY CARE | Facility: CLINIC | Age: 74
End: 2024-03-11
Payer: MEDICARE

## 2024-03-12 ENCOUNTER — APPOINTMENT (OUTPATIENT)
Dept: PHARMACY | Facility: CLINIC | Age: 74
End: 2024-03-12
Payer: MEDICARE

## 2024-03-13 PROBLEM — Z86.39 HISTORY OF HYPERCHOLESTEROLEMIA: Status: ACTIVE | Noted: 2024-03-13

## 2024-03-13 PROBLEM — Z86.79 HISTORY OF RHEUMATIC FEVER: Status: ACTIVE | Noted: 2024-03-13

## 2024-03-13 PROBLEM — Z86.39 HISTORY OF HYPERCHOLESTEROLEMIA: Status: RESOLVED | Noted: 2024-03-13 | Resolved: 2024-03-13

## 2024-03-13 PROBLEM — Z86.39 HISTORY OF HYPOTHYROIDISM: Status: ACTIVE | Noted: 2024-03-13

## 2024-03-13 PROBLEM — Z86.39 HISTORY OF OBESITY: Status: ACTIVE | Noted: 2024-03-13

## 2024-03-13 PROBLEM — B02.9 HERPES ZOSTER: Status: ACTIVE | Noted: 2024-03-13

## 2024-03-13 PROBLEM — Z86.79 HISTORY OF HYPERTENSION: Status: ACTIVE | Noted: 2024-03-13

## 2024-03-13 PROBLEM — Z86.79 HISTORY OF HYPERTENSION: Status: RESOLVED | Noted: 2024-03-13 | Resolved: 2024-03-13

## 2024-03-13 PROBLEM — I25.2 HISTORY OF NON-ST ELEVATION MYOCARDIAL INFARCTION (NSTEMI): Status: ACTIVE | Noted: 2024-03-13

## 2024-03-13 PROBLEM — Z86.16 PERSONAL HISTORY OF COVID-19: Status: ACTIVE | Noted: 2023-05-03

## 2024-03-18 ENCOUNTER — APPOINTMENT (OUTPATIENT)
Dept: PHARMACY | Facility: CLINIC | Age: 74
End: 2024-03-18
Payer: MEDICARE

## 2024-03-20 ENCOUNTER — OFFICE VISIT (OUTPATIENT)
Dept: CARDIOLOGY | Facility: CLINIC | Age: 74
End: 2024-03-20
Payer: MEDICARE

## 2024-03-20 ENCOUNTER — HOSPITAL ENCOUNTER (OUTPATIENT)
Dept: CARDIOLOGY | Facility: CLINIC | Age: 74
Discharge: HOME | End: 2024-03-20
Payer: MEDICARE

## 2024-03-20 VITALS
SYSTOLIC BLOOD PRESSURE: 124 MMHG | BODY MASS INDEX: 40.48 KG/M2 | HEIGHT: 62 IN | WEIGHT: 220 LBS | DIASTOLIC BLOOD PRESSURE: 78 MMHG

## 2024-03-20 VITALS
OXYGEN SATURATION: 98 % | HEART RATE: 80 BPM | SYSTOLIC BLOOD PRESSURE: 118 MMHG | WEIGHT: 216 LBS | BODY MASS INDEX: 39.75 KG/M2 | DIASTOLIC BLOOD PRESSURE: 68 MMHG | HEIGHT: 62 IN

## 2024-03-20 DIAGNOSIS — I48.11 LONGSTANDING PERSISTENT ATRIAL FIBRILLATION (MULTI): ICD-10-CM

## 2024-03-20 DIAGNOSIS — E03.9 HYPOTHYROIDISM, UNSPECIFIED TYPE: ICD-10-CM

## 2024-03-20 DIAGNOSIS — J43.2 CENTRILOBULAR EMPHYSEMA (MULTI): ICD-10-CM

## 2024-03-20 DIAGNOSIS — Z95.4 PRESENCE OF OTHER HEART-VALVE REPLACEMENT: ICD-10-CM

## 2024-03-20 DIAGNOSIS — I48.0 PAROXYSMAL ATRIAL FIBRILLATION (MULTI): ICD-10-CM

## 2024-03-20 DIAGNOSIS — E11.9 DIABETES MELLITUS WITHOUT COMPLICATION (MULTI): ICD-10-CM

## 2024-03-20 DIAGNOSIS — Z95.2 MECHANICAL HEART VALVE PRESENT: Primary | ICD-10-CM

## 2024-03-20 DIAGNOSIS — I50.32 CHRONIC HEART FAILURE WITH PRESERVED EJECTION FRACTION (MULTI): ICD-10-CM

## 2024-03-20 DIAGNOSIS — Z95.2 MECHANICAL HEART VALVE PRESENT: ICD-10-CM

## 2024-03-20 DIAGNOSIS — I10 PRIMARY HYPERTENSION: ICD-10-CM

## 2024-03-20 DIAGNOSIS — E66.9 CLASS 1 OBESITY WITHOUT SERIOUS COMORBIDITY WITH BODY MASS INDEX (BMI) OF 31.0 TO 31.9 IN ADULT, UNSPECIFIED OBESITY TYPE: ICD-10-CM

## 2024-03-20 DIAGNOSIS — E78.00 PURE HYPERCHOLESTEROLEMIA: ICD-10-CM

## 2024-03-20 LAB
AORTIC VALVE MEAN GRADIENT: 9.9 MMHG
AORTIC VALVE PEAK VELOCITY: 2.12 M/S
AV PEAK GRADIENT: 17.9 MMHG
AVA (PEAK VEL): 1.7 CM2
AVA (VTI): 1.53 CM2
EJECTION FRACTION APICAL 4 CHAMBER: 55
EJECTION FRACTION: 63 %
LEFT ATRIUM VOLUME AREA LENGTH INDEX BSA: 41.4 ML/M2
LEFT VENTRICLE INTERNAL DIMENSION DIASTOLE: 4.53 CM (ref 3.5–6)
LEFT VENTRICULAR OUTFLOW TRACT DIAMETER: 1.73 CM
RIGHT VENTRICLE FREE WALL PEAK S': 0.08 CM/S
RIGHT VENTRICLE PEAK SYSTOLIC PRESSURE: 48.6 MMHG

## 2024-03-20 PROCEDURE — 3074F SYST BP LT 130 MM HG: CPT | Performed by: INTERNAL MEDICINE

## 2024-03-20 PROCEDURE — 3008F BODY MASS INDEX DOCD: CPT | Performed by: INTERNAL MEDICINE

## 2024-03-20 PROCEDURE — 1159F MED LIST DOCD IN RCRD: CPT | Performed by: INTERNAL MEDICINE

## 2024-03-20 PROCEDURE — 2500000004 HC RX 250 GENERAL PHARMACY W/ HCPCS (ALT 636 FOR OP/ED): Performed by: INTERNAL MEDICINE

## 2024-03-20 PROCEDURE — 99215 OFFICE O/P EST HI 40 MIN: CPT | Performed by: INTERNAL MEDICINE

## 2024-03-20 PROCEDURE — 93306 TTE W/DOPPLER COMPLETE: CPT | Performed by: INTERNAL MEDICINE

## 2024-03-20 PROCEDURE — 1160F RVW MEDS BY RX/DR IN RCRD: CPT | Performed by: INTERNAL MEDICINE

## 2024-03-20 PROCEDURE — 1036F TOBACCO NON-USER: CPT | Performed by: INTERNAL MEDICINE

## 2024-03-20 PROCEDURE — 3078F DIAST BP <80 MM HG: CPT | Performed by: INTERNAL MEDICINE

## 2024-03-20 PROCEDURE — 93306 TTE W/DOPPLER COMPLETE: CPT

## 2024-03-20 PROCEDURE — 93000 ELECTROCARDIOGRAM COMPLETE: CPT | Performed by: INTERNAL MEDICINE

## 2024-03-20 RX ADMIN — PERFLUTREN 2 ML OF DILUTION: 6.52 INJECTION, SUSPENSION INTRAVENOUS at 13:53

## 2024-03-20 NOTE — PROGRESS NOTES
"  Subjective  Genet Wilks  is a 73 y.o. year old female who presents for F/U mechanical MVR and atrial fibrillation.  No dyspne, no palpaitions, occ, musculoskeletal chest pain, slight edema    Blood pressure 118/68, pulse 80, height 1.575 m (5' 2\"), weight 98 kg (216 lb), SpO2 98 %.   Penicillins, Prednisone, Mushroom, and Simvastatin  Past Medical History:   Diagnosis Date    Long term (current) use of anticoagulants     Long-term (current) use of anticoagulants    Old myocardial infarction     History of non-ST elevation myocardial infarction (NSTEMI)    Personal history of (corrected) congenital malformations of heart and circulatory system     History of congenital anomaly of heart    Personal history of other diseases of the circulatory system     History of rheumatic fever    Personal history of other diseases of the circulatory system     History of mitral valve prolapse    Personal history of other diseases of the circulatory system     History of atrial fibrillation    Personal history of other diseases of the musculoskeletal system and connective tissue     History of back pain    Personal history of other diseases of the nervous system and sense organs     History of carpal tunnel syndrome    Personal history of other diseases of the respiratory system     History of asthma    Personal history of other endocrine, nutritional and metabolic disease     History of morbid obesity    Personal history of other endocrine, nutritional and metabolic disease     History of hypothyroidism    Personal history of other endocrine, nutritional and metabolic disease     History of hypercholesterolemia    Personal history of other specified conditions     History of chest pain    Presence of other heart-valve replacement     Heart valve replaced by other means    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    Mechanical heart valve present     Past Surgical History:   Procedure Laterality Date    CARDIAC " CATHETERIZATION  06/15/2018    Cardiac Cath Procedure Outcome:    CARDIAC SURGERY  11/13/2014    Heart Surgery    KNEE SURGERY  06/15/2018    Knee Surgery    OTHER SURGICAL HISTORY  11/13/2014    Surgery    OTHER SURGICAL HISTORY  11/13/2014    Colposcopy Cervix With Biopsy(S)    TONSILLECTOMY  11/13/2014    Tonsillectomy    TUBAL LIGATION  11/13/2014    Tubal Ligation     Family History   Problem Relation Name Age of Onset    Diabetes Mother      Stroke Father      Hypertension Father      Diabetes Sister      Breast cancer Sister      Throat cancer Brother      Diabetes Brother      Other (S/P CABG x3) Brother      Cancer Other sibling      @SOC    Current Outpatient Medications   Medication Sig Dispense Refill    albuterol 0.63 mg/3 mL nebulizer solution Take 3 mL (0.63 mg) by nebulization 3 times a day. 270 mL 3    albuterol 90 mcg/actuation inhaler Inhale.      amLODIPine (Norvasc) 5 mg tablet Take 1 tablet by mouth once daily 30 tablet 3    aspirin 81 mg EC tablet Take by mouth.      atorvastatin (Lipitor) 20 mg tablet Take 1 tablet (20 mg) by mouth once daily at bedtime.      benzonatate (Tessalon) 100 mg capsule Take by mouth.      cholecalciferol (Vitamin D-3) 50 MCG (2000 UT) tablet Take by mouth.      enoxaparin (Lovenox) 100 mg/mL syringe Inject 100mg subcutaneous twice daily as directed by [facility] per bridging instructions. 3 each 0    furosemide (Lasix) 40 mg tablet Take 1 tablet (40 mg) by mouth 2 times a day.      gabapentin (Neurontin) 100 mg capsule Take by mouth.      glimepiride (Amaryl) 2 mg tablet Take 1 tablet (2 mg) by mouth once daily. as directed 90 tablet 1    lancets (OneTouch Delica Plus Lancet) 33 gauge misc USE 1 TO CHECK GLUCOSE ONCE DAILY 100 each 3    levothyroxine (Synthroid, Levoxyl) 75 mcg tablet TAKE 1 TABLET BY MOUTH ONCE DAILY AS DIRECTED 90 tablet 0    metoprolol succinate XL (Toprol-XL) 25 mg 24 hr tablet Take 1 tablet (25 mg) by mouth once daily.       OneTouch Ultra Test strip use 1 strip to check glucose once daily      oxygen (O2) gas therapy Inhale.      warfarin (Coumadin) 5 mg tablet USE AS DIRECTED PER [facility] (60 TABLETS = 90 DAY SUPPLY) 60 tablet 0    warfarin (Coumadin) 5 mg tablet Take 5 mg (1 tablet) every Monday and 2.5 mg (1/2 tablet) on all other days or as directed by the anticoagulation clinic. 55 tablet 1     No current facility-administered medications for this visit.        ROS  Review of Systems   All other systems reviewed and are negative.      Physical Exam  Physical Exam  Constitutional:       Appearance: She is obese.   HENT:      Head: Normocephalic and atraumatic.   Cardiovascular:      Rate and Rhythm: Rhythm irregular.      Comments: Crisp valve sounds  Musculoskeletal:      Right lower le+ Pitting Edema present.      Left lower le+ Pitting Edema present.   Skin:     General: Skin is warm and dry.   Neurological:      General: No focal deficit present.      Mental Status: She is alert and oriented to person, place, and time.   Psychiatric:         Mood and Affect: Mood normal.         Behavior: Behavior normal.          EKG  Encounter Date: 23   ECG 12 Lead    Narrative    Atrial fibrillation at 76/min., ST-T abn       Problem List Items Addressed This Visit       Diabetes mellitus without complication (CMS/HCC)    (HFpEF) heart failure with preserved ejection fraction (CMS/HCC)    COPD (chronic obstructive pulmonary disease) (CMS/HCC)    Hypothyroidism    Mechanical heart valve present - Primary     3/20/24 echocardiogram normally functioning mechanical MVR, LVEF =60-65%, mild oartic valve regurgitation Tr with mild to moderately elevated RVSP (COPD) Reviewed today         Relevant Orders    Follow Up In Cardiology    Hyperlipidemia    Obesity    Hypertension    Paroxysmal atrial fibrillation (CMS/HCC)     Other Visit Diagnoses       Longstanding persistent atrial fibrillation (CMS/HCC)         Relevant Orders    ECG 12 Lead    Follow Up In Cardiology              Return 3 months with EKG      Lul Day MD

## 2024-03-20 NOTE — ASSESSMENT & PLAN NOTE
3/20/24 echocardiogram normally functioning mechanical MVR, LVEF =60-65%, mild oartic valve regurgitation Tr with mild to moderately elevated RVSP (COPD) Reviewed today

## 2024-03-22 ENCOUNTER — ANTICOAGULATION - WARFARIN VISIT (OUTPATIENT)
Dept: PHARMACY | Facility: CLINIC | Age: 74
End: 2024-03-22
Payer: MEDICARE

## 2024-03-22 DIAGNOSIS — I48.0 PAROXYSMAL ATRIAL FIBRILLATION (MULTI): ICD-10-CM

## 2024-03-22 DIAGNOSIS — Z95.2 MECHANICAL HEART VALVE PRESENT: Primary | ICD-10-CM

## 2024-03-22 LAB
POC INR: 3.6
POC PROTHROMBIN TIME: NORMAL

## 2024-03-22 PROCEDURE — 85610 PROTHROMBIN TIME: CPT | Mod: QW | Performed by: PHARMACIST

## 2024-03-22 PROCEDURE — 99212 OFFICE O/P EST SF 10 MIN: CPT | Performed by: PHARMACIST

## 2024-03-22 NOTE — PROGRESS NOTES
Coumadin Clinic Visit Note    Patient verified warfarin dose  No missed doses  No unusual bruising or bleeding, took some tylenol this week   No changes to medications  Consistent dietary green intake  No anticipated procedures at this time  INR Supratherapeutic today at 3.6 , she took an extra 1/2 tablet ;last week (2.5 mg )  since  her sister  and was confused and thought she did not take it and took one extra so reason for high INR   No changes to warfarin dose today  Next appointment 4 weeks      Nancy Ludwig, PharmD

## 2024-04-19 ENCOUNTER — ANTICOAGULATION - WARFARIN VISIT (OUTPATIENT)
Dept: PHARMACY | Facility: CLINIC | Age: 74
End: 2024-04-19
Payer: MEDICARE

## 2024-04-19 DIAGNOSIS — Z95.2 MECHANICAL HEART VALVE PRESENT: Primary | ICD-10-CM

## 2024-04-19 DIAGNOSIS — I48.0 PAROXYSMAL ATRIAL FIBRILLATION (MULTI): ICD-10-CM

## 2024-04-19 LAB
POC INR: 3
POC PROTHROMBIN TIME: NORMAL

## 2024-04-19 PROCEDURE — 99212 OFFICE O/P EST SF 10 MIN: CPT | Performed by: PHARMACIST

## 2024-04-19 PROCEDURE — 85610 PROTHROMBIN TIME: CPT | Mod: QW | Performed by: PHARMACIST

## 2024-04-19 PROCEDURE — 85610 PROTHROMBIN TIME: CPT | Performed by: PHARMACIST

## 2024-04-19 NOTE — PROGRESS NOTES
Verified current dose with pt.    No new meds or med changes since last visit.  Pt denies unusual bleed/bruise.  No upcoming procedures.  Inr = 3  Continue same dose and check again in 4 weeks.

## 2024-05-17 ENCOUNTER — ANTICOAGULATION - WARFARIN VISIT (OUTPATIENT)
Dept: PHARMACY | Facility: CLINIC | Age: 74
End: 2024-05-17
Payer: MEDICARE

## 2024-05-17 DIAGNOSIS — I48.0 PAROXYSMAL ATRIAL FIBRILLATION (MULTI): ICD-10-CM

## 2024-05-17 DIAGNOSIS — Z95.2 MECHANICAL HEART VALVE PRESENT: Primary | ICD-10-CM

## 2024-05-17 LAB
POC INR: 3.6
POC PROTHROMBIN TIME: NORMAL

## 2024-05-17 PROCEDURE — 85610 PROTHROMBIN TIME: CPT | Mod: QW | Performed by: PHARMACIST

## 2024-05-17 PROCEDURE — 99212 OFFICE O/P EST SF 10 MIN: CPT | Performed by: PHARMACIST

## 2024-05-17 NOTE — PROGRESS NOTES
Coumadin Clinic Visit Note    Patient verified warfarin dose  No missed doses  No unusual bruising or bleeding  No changes to medications  Consistent dietary green intake  No anticipated procedures at this time, ghad some tylenol for headache , , and less green sover the month   INR Supratherapeutic today at 3.6  No changes to warfarin dose today  Next appointment 4 weeks      Nancy Ludwig, PharmD

## 2024-05-24 DIAGNOSIS — E78.00 PURE HYPERCHOLESTEROLEMIA: ICD-10-CM

## 2024-05-24 RX ORDER — ATORVASTATIN CALCIUM 20 MG/1
20 TABLET, FILM COATED ORAL NIGHTLY
Qty: 90 TABLET | Refills: 3 | Status: SHIPPED | OUTPATIENT
Start: 2024-05-24

## 2024-05-28 DIAGNOSIS — M25.471 ANKLE EDEMA, BILATERAL: ICD-10-CM

## 2024-05-28 DIAGNOSIS — M25.472 ANKLE EDEMA, BILATERAL: ICD-10-CM

## 2024-05-28 DIAGNOSIS — E11.9 DIABETES MELLITUS WITHOUT COMPLICATION (MULTI): ICD-10-CM

## 2024-05-28 RX ORDER — AMLODIPINE BESYLATE 5 MG/1
5 TABLET ORAL DAILY
Qty: 30 TABLET | Refills: 3 | Status: SHIPPED | OUTPATIENT
Start: 2024-05-28

## 2024-05-29 PROBLEM — Z95.2 HEART VALVE TRANSPLANTED: Status: ACTIVE | Noted: 2023-10-04

## 2024-05-29 PROBLEM — I50.21 ACUTE SYSTOLIC CONGESTIVE HEART FAILURE (MULTI): Status: RESOLVED | Noted: 2023-05-11 | Resolved: 2024-05-29

## 2024-06-14 ENCOUNTER — APPOINTMENT (OUTPATIENT)
Dept: PHARMACY | Facility: CLINIC | Age: 74
End: 2024-06-14
Payer: MEDICARE

## 2024-06-21 ENCOUNTER — ANTICOAGULATION - WARFARIN VISIT (OUTPATIENT)
Dept: PHARMACY | Facility: CLINIC | Age: 74
End: 2024-06-21
Payer: MEDICARE

## 2024-06-21 DIAGNOSIS — I48.0 PAROXYSMAL ATRIAL FIBRILLATION (MULTI): ICD-10-CM

## 2024-06-21 DIAGNOSIS — Z95.2 MECHANICAL HEART VALVE PRESENT: Primary | ICD-10-CM

## 2024-06-21 LAB
POC INR: 2.9
POC PROTHROMBIN TIME: NORMAL

## 2024-06-21 PROCEDURE — 99212 OFFICE O/P EST SF 10 MIN: CPT

## 2024-06-21 PROCEDURE — 85610 PROTHROMBIN TIME: CPT | Mod: QW

## 2024-06-21 NOTE — PROGRESS NOTES
No bleeding. Pt has several bruises on arms.  Medications and doses verified.  No scheduled procedures at this time.  INR=2.9   Plan: Continue same weekly dose.  Follow up INR check in 4 weeks.

## 2024-06-24 ENCOUNTER — APPOINTMENT (OUTPATIENT)
Dept: CARDIOLOGY | Facility: CLINIC | Age: 74
End: 2024-06-24
Payer: MEDICARE

## 2024-06-27 ENCOUNTER — TELEPHONE (OUTPATIENT)
Dept: CARDIOLOGY | Facility: CLINIC | Age: 74
End: 2024-06-27
Payer: MEDICARE

## 2024-06-27 DIAGNOSIS — E03.9 HYPOTHYROIDISM, UNSPECIFIED TYPE: ICD-10-CM

## 2024-06-27 RX ORDER — LEVOTHYROXINE SODIUM 75 UG/1
75 TABLET ORAL DAILY
Qty: 90 TABLET | Refills: 0 | Status: SHIPPED | OUTPATIENT
Start: 2024-06-27

## 2024-06-27 NOTE — TELEPHONE ENCOUNTER
Pt wanted a refill for her thyroid medication she couldnt even give the name of her medication she needed I  informed the patient if that is  the medication she does need refilled Dr. Day was her cardioligist and that she would have to contact her primary care doctor for that prescription and call us back if she needs anything else

## 2024-07-02 DIAGNOSIS — Z95.2 HISTORY OF PROSTHETIC HEART VALVE: ICD-10-CM

## 2024-07-02 RX ORDER — WARFARIN SODIUM 5 MG/1
TABLET ORAL
Qty: 70 TABLET | Refills: 1 | Status: SHIPPED | OUTPATIENT
Start: 2024-07-02 | End: 2025-07-02

## 2024-07-08 ENCOUNTER — APPOINTMENT (OUTPATIENT)
Dept: CARDIOLOGY | Facility: CLINIC | Age: 74
End: 2024-07-08
Payer: MEDICARE

## 2024-07-19 ENCOUNTER — APPOINTMENT (OUTPATIENT)
Dept: PHARMACY | Facility: CLINIC | Age: 74
End: 2024-07-19
Payer: MEDICARE

## 2024-07-22 ENCOUNTER — APPOINTMENT (OUTPATIENT)
Dept: CARDIOLOGY | Facility: CLINIC | Age: 74
End: 2024-07-22
Payer: MEDICARE

## 2024-07-24 ENCOUNTER — APPOINTMENT (OUTPATIENT)
Dept: PHARMACY | Facility: CLINIC | Age: 74
End: 2024-07-24
Payer: MEDICARE

## 2024-07-30 ENCOUNTER — ANTICOAGULATION - WARFARIN VISIT (OUTPATIENT)
Dept: PHARMACY | Facility: CLINIC | Age: 74
End: 2024-07-30
Payer: MEDICARE

## 2024-07-30 DIAGNOSIS — I48.0 PAROXYSMAL ATRIAL FIBRILLATION (MULTI): ICD-10-CM

## 2024-07-30 DIAGNOSIS — Z95.2 MECHANICAL HEART VALVE PRESENT: Primary | ICD-10-CM

## 2024-07-30 LAB
POC INR: 2.6
POC PROTHROMBIN TIME: NORMAL

## 2024-07-30 PROCEDURE — 99212 OFFICE O/P EST SF 10 MIN: CPT

## 2024-07-30 PROCEDURE — 85610 PROTHROMBIN TIME: CPT | Mod: QW

## 2024-07-30 NOTE — PROGRESS NOTES
Coumadin Clinic Visit Note    Patient verified warfarin dose  No missed doses  No unusual bruising or bleeding  No changes to medications  Consistent dietary green intake  No anticipated procedures at this time  INR Therapeutic today at 2.6  No changes to warfarin dose today  Next appointment 5 weeks      Omkar Davenport, PharmD

## 2024-08-04 DIAGNOSIS — E13.9 DIABETES 1.5, MANAGED AS TYPE 2 (MULTI): ICD-10-CM

## 2024-08-04 DIAGNOSIS — E03.9 HYPOTHYROIDISM, UNSPECIFIED TYPE: ICD-10-CM

## 2024-08-05 RX ORDER — GLIMEPIRIDE 2 MG/1
2 TABLET ORAL DAILY
Qty: 90 TABLET | Refills: 1 | Status: SHIPPED | OUTPATIENT
Start: 2024-08-05

## 2024-08-05 RX ORDER — LEVOTHYROXINE SODIUM 75 UG/1
75 TABLET ORAL DAILY
Qty: 90 TABLET | Refills: 1 | Status: SHIPPED | OUTPATIENT
Start: 2024-08-05

## 2024-08-06 ENCOUNTER — APPOINTMENT (OUTPATIENT)
Dept: CARDIOLOGY | Facility: CLINIC | Age: 74
End: 2024-08-06
Payer: MEDICARE

## 2024-08-09 DIAGNOSIS — E11.9 TYPE 2 DIABETES MELLITUS WITHOUT COMPLICATION, WITHOUT LONG-TERM CURRENT USE OF INSULIN (MULTI): Primary | ICD-10-CM

## 2024-08-09 RX ORDER — BLOOD SUGAR DIAGNOSTIC
STRIP MISCELLANEOUS
Qty: 100 EACH | Refills: 3 | Status: SHIPPED | OUTPATIENT
Start: 2024-08-09

## 2024-08-15 ENCOUNTER — APPOINTMENT (OUTPATIENT)
Dept: CARDIOLOGY | Facility: CLINIC | Age: 74
End: 2024-08-15
Payer: MEDICARE

## 2024-09-04 ENCOUNTER — APPOINTMENT (OUTPATIENT)
Dept: PHARMACY | Facility: CLINIC | Age: 74
End: 2024-09-04
Payer: MEDICARE

## 2024-09-05 ENCOUNTER — ANTICOAGULATION - WARFARIN VISIT (OUTPATIENT)
Dept: PHARMACY | Facility: CLINIC | Age: 74
End: 2024-09-05
Payer: MEDICARE

## 2024-09-05 ENCOUNTER — OFFICE VISIT (OUTPATIENT)
Dept: CARDIOLOGY | Facility: CLINIC | Age: 74
End: 2024-09-05
Payer: MEDICARE

## 2024-09-05 VITALS
HEIGHT: 62 IN | BODY MASS INDEX: 40.3 KG/M2 | OXYGEN SATURATION: 96 % | WEIGHT: 219 LBS | SYSTOLIC BLOOD PRESSURE: 132 MMHG | DIASTOLIC BLOOD PRESSURE: 78 MMHG | HEART RATE: 80 BPM

## 2024-09-05 DIAGNOSIS — Z95.2 MECHANICAL HEART VALVE PRESENT: Primary | ICD-10-CM

## 2024-09-05 DIAGNOSIS — E11.9 TYPE 2 DIABETES MELLITUS WITHOUT COMPLICATION, WITHOUT LONG-TERM CURRENT USE OF INSULIN (MULTI): ICD-10-CM

## 2024-09-05 DIAGNOSIS — Z86.39 HISTORY OF HYPOTHYROIDISM: ICD-10-CM

## 2024-09-05 DIAGNOSIS — Z86.79 HISTORY OF RHEUMATIC FEVER: ICD-10-CM

## 2024-09-05 DIAGNOSIS — E66.9 CLASS 1 OBESITY WITHOUT SERIOUS COMORBIDITY WITH BODY MASS INDEX (BMI) OF 31.0 TO 31.9 IN ADULT, UNSPECIFIED OBESITY TYPE: ICD-10-CM

## 2024-09-05 DIAGNOSIS — I50.32 CHRONIC HEART FAILURE WITH PRESERVED EJECTION FRACTION (MULTI): ICD-10-CM

## 2024-09-05 DIAGNOSIS — E78.2 MIXED HYPERLIPIDEMIA: ICD-10-CM

## 2024-09-05 DIAGNOSIS — I48.11 LONGSTANDING PERSISTENT ATRIAL FIBRILLATION (MULTI): ICD-10-CM

## 2024-09-05 DIAGNOSIS — J43.2 CENTRILOBULAR EMPHYSEMA (MULTI): ICD-10-CM

## 2024-09-05 DIAGNOSIS — I48.0 PAROXYSMAL ATRIAL FIBRILLATION (MULTI): ICD-10-CM

## 2024-09-05 LAB
POC INR: 4.6
POC PROTHROMBIN TIME: NORMAL

## 2024-09-05 PROCEDURE — 99214 OFFICE O/P EST MOD 30 MIN: CPT | Performed by: INTERNAL MEDICINE

## 2024-09-05 PROCEDURE — 3075F SYST BP GE 130 - 139MM HG: CPT | Performed by: INTERNAL MEDICINE

## 2024-09-05 PROCEDURE — 93010 ELECTROCARDIOGRAM REPORT: CPT | Performed by: INTERNAL MEDICINE

## 2024-09-05 PROCEDURE — 3078F DIAST BP <80 MM HG: CPT | Performed by: INTERNAL MEDICINE

## 2024-09-05 PROCEDURE — 85610 PROTHROMBIN TIME: CPT | Mod: QW | Performed by: PHARMACIST

## 2024-09-05 PROCEDURE — 99212 OFFICE O/P EST SF 10 MIN: CPT | Performed by: PHARMACIST

## 2024-09-05 PROCEDURE — 93005 ELECTROCARDIOGRAM TRACING: CPT | Performed by: INTERNAL MEDICINE

## 2024-09-05 PROCEDURE — 1036F TOBACCO NON-USER: CPT | Performed by: INTERNAL MEDICINE

## 2024-09-05 PROCEDURE — 1159F MED LIST DOCD IN RCRD: CPT | Performed by: INTERNAL MEDICINE

## 2024-09-05 PROCEDURE — 1160F RVW MEDS BY RX/DR IN RCRD: CPT | Performed by: INTERNAL MEDICINE

## 2024-09-05 PROCEDURE — 3008F BODY MASS INDEX DOCD: CPT | Performed by: INTERNAL MEDICINE

## 2024-09-05 NOTE — PROGRESS NOTES
Coumadin Clinic Visit Note    Patient verified warfarin dose  No missed doses  No unusual bruising or bleeding  No changes to medications  Consistent dietary green intake  No anticipated procedures at this time,took more tylenol this month since had headches , she will check her eyes ,less greens also , barely any comared to her ususal  , no sickess , , nuase ,  no vomitting , no antibitics , no alcohol   INR Supratherapeutic today at 4.6  No changes to warfarin dose today  Next appointment 3 weeks      Nancy Ludwig, RohithD

## 2024-09-05 NOTE — ASSESSMENT & PLAN NOTE
3/20/24 echocadiogram normally functioning mechanical MVR with LVEf = 60-65% and mild to moderately elevated RVSP frm COPD

## 2024-09-05 NOTE — PROGRESS NOTES
"  Subjective  Genet Wilks  is a 74 y.o. year old female who presents for S/P MVR.  Feels well,  Rare palpitations, with occ throbbing in the back,  occ. Vertigo which she has had, no chest pain, getting around well    Blood pressure 132/78, pulse 80, height 1.575 m (5' 2\"), weight 99.3 kg (219 lb), SpO2 96%.   Penicillins, Prednisone, Mushroom, and Simvastatin  Past Medical History:   Diagnosis Date    Long term (current) use of anticoagulants     Long-term (current) use of anticoagulants    Old myocardial infarction     History of non-ST elevation myocardial infarction (NSTEMI)    Personal history of (corrected) congenital malformations of heart and circulatory system     History of congenital anomaly of heart    Personal history of other diseases of the circulatory system     History of rheumatic fever    Personal history of other diseases of the circulatory system     History of mitral valve prolapse    Personal history of other diseases of the circulatory system     History of atrial fibrillation    Personal history of other diseases of the musculoskeletal system and connective tissue     History of back pain    Personal history of other diseases of the nervous system and sense organs     History of carpal tunnel syndrome    Personal history of other diseases of the respiratory system     History of asthma    Personal history of other endocrine, nutritional and metabolic disease     History of morbid obesity    Personal history of other endocrine, nutritional and metabolic disease     History of hypothyroidism    Personal history of other endocrine, nutritional and metabolic disease     History of hypercholesterolemia    Personal history of other specified conditions     History of chest pain    Presence of other heart-valve replacement     Heart valve replaced by other means    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    Mechanical heart valve present     Past Surgical History:   Procedure Laterality " Date    CARDIAC CATHETERIZATION  06/15/2018    Cardiac Cath Procedure Outcome:    CARDIAC SURGERY  11/13/2014    Heart Surgery    KNEE SURGERY  06/15/2018    Knee Surgery    OTHER SURGICAL HISTORY  11/13/2014    Surgery    OTHER SURGICAL HISTORY  11/13/2014    Colposcopy Cervix With Biopsy(S)    TONSILLECTOMY  11/13/2014    Tonsillectomy    TUBAL LIGATION  11/13/2014    Tubal Ligation     Family History   Problem Relation Name Age of Onset    Diabetes Mother      Stroke Father      Hypertension Father      Diabetes Sister      Breast cancer Sister      Throat cancer Brother      Diabetes Brother      Other (S/P CABG x3) Brother      Cancer Other sibling      @SOC    Current Outpatient Medications   Medication Sig Dispense Refill    albuterol 0.63 mg/3 mL nebulizer solution Take 3 mL (0.63 mg) by nebulization 3 times a day. 270 mL 3    albuterol 90 mcg/actuation inhaler Inhale.      amLODIPine (Norvasc) 5 mg tablet Take 1 tablet (5 mg) by mouth once daily. 30 tablet 3    aspirin 81 mg EC tablet Take by mouth.      atorvastatin (Lipitor) 20 mg tablet TAKE 1 TABLET BY MOUTH AT BEDTIME 90 tablet 3    benzonatate (Tessalon) 100 mg capsule Take by mouth.      blood sugar diagnostic (OneTouch Ultra Test) strip USE 1 STRIP TO CHECK GLUCOSE ONCE DAILY 100 each 3    cholecalciferol (Vitamin D-3) 50 MCG (2000 UT) tablet Take by mouth.      enoxaparin (Lovenox) 100 mg/mL syringe Inject 100mg subcutaneous twice daily as directed by [facility] per bridging instructions. 3 each 0    furosemide (Lasix) 40 mg tablet Take 1 tablet (40 mg) by mouth 2 times a day.      gabapentin (Neurontin) 100 mg capsule Take by mouth.      glimepiride (Amaryl) 2 mg tablet TAKE 1 TABLET BY MOUTH ONCE DAILY AS DIRECTED 90 tablet 1    lancets (OneTouch Delica Plus Lancet) 33 gauge misc USE 1 TO CHECK GLUCOSE ONCE DAILY 100 each 3    levothyroxine (Synthroid, Levoxyl) 75 mcg tablet TAKE 1 TABLET BY MOUTH ONCE DAILY AS DIRECTED 90 tablet 1     metoprolol succinate XL (Toprol-XL) 25 mg 24 hr tablet Take 1 tablet (25 mg) by mouth once daily.      oxygen (O2) gas therapy Inhale.      warfarin (Coumadin) 5 mg tablet Take 5 mg (1 tablet) every Monday and 2.5 mg (1/2 tablet) on all other days or as directed by the anticoagulation clinic. 70 tablet 1     No current facility-administered medications for this visit.        ROS  Review of Systems   All other systems reviewed and are negative.      Physical Exam  Physical Exam  Constitutional:       Appearance: She is obese.   HENT:      Head: Normocephalic and atraumatic.   Cardiovascular:      Rate and Rhythm: Normal rate. Rhythm irregular.      Comments: Crisp valve sounds  Pulmonary:      Effort: Pulmonary effort is normal.      Breath sounds: Normal breath sounds.   Skin:     General: Skin is warm and dry.   Neurological:      General: No focal deficit present.      Mental Status: She is alert and oriented to person, place, and time.   Psychiatric:         Mood and Affect: Mood normal.         Behavior: Behavior normal.          EKG  Encounter Date: 09/05/24   ECG 12 Lead    Narrative    Atrial fibrillation at 80/min., ST-T abn       Problem List Items Addressed This Visit       Mixed hyperlipidemia    (HFpEF) heart failure with preserved ejection fraction (Multi)    COPD (chronic obstructive pulmonary disease) (Multi)    Mechanical heart valve present - Primary     3/20/24 echocadiogram normally functioning mechanical MVR with LVEf = 60-65% and mild to moderately elevated RVSP frm COPD         Relevant Orders    Transthoracic Echo (TTE) Complete    Follow Up In Cardiology    Diabetes mellitus (Multi)    Obesity    Paroxysmal atrial fibrillation (Multi)    History of hypothyroidism    History of rheumatic fever     Other Visit Diagnoses       Longstanding persistent atrial fibrillation (Multi)        Relevant Orders    ECG 12 Lead (Completed)    Transthoracic Echo (TTE) Complete    Follow Up In Cardiology               Return 3 months with EKG and echocardiogram    Lul Day MD

## 2024-09-09 ENCOUNTER — APPOINTMENT (OUTPATIENT)
Dept: PRIMARY CARE | Facility: CLINIC | Age: 74
End: 2024-09-09
Payer: MEDICARE

## 2024-09-09 ENCOUNTER — TELEPHONE (OUTPATIENT)
Dept: PRIMARY CARE | Facility: CLINIC | Age: 74
End: 2024-09-09

## 2024-09-09 VITALS
WEIGHT: 220 LBS | OXYGEN SATURATION: 95 % | HEART RATE: 86 BPM | DIASTOLIC BLOOD PRESSURE: 72 MMHG | RESPIRATION RATE: 18 BRPM | SYSTOLIC BLOOD PRESSURE: 142 MMHG | HEIGHT: 62 IN | BODY MASS INDEX: 40.48 KG/M2 | TEMPERATURE: 97.9 F

## 2024-09-09 DIAGNOSIS — Z95.2 MECHANICAL HEART VALVE PRESENT: ICD-10-CM

## 2024-09-09 DIAGNOSIS — E78.00 PURE HYPERCHOLESTEROLEMIA: ICD-10-CM

## 2024-09-09 DIAGNOSIS — E55.9 VITAMIN D DEFICIENCY: ICD-10-CM

## 2024-09-09 DIAGNOSIS — E03.9 HYPOTHYROIDISM, UNSPECIFIED TYPE: ICD-10-CM

## 2024-09-09 DIAGNOSIS — E11.9 TYPE 2 DIABETES MELLITUS WITHOUT COMPLICATION, WITHOUT LONG-TERM CURRENT USE OF INSULIN (MULTI): ICD-10-CM

## 2024-09-09 DIAGNOSIS — I10 PRIMARY HYPERTENSION: ICD-10-CM

## 2024-09-09 DIAGNOSIS — Z12.31 ENCOUNTER FOR SCREENING MAMMOGRAM FOR MALIGNANT NEOPLASM OF BREAST: ICD-10-CM

## 2024-09-09 DIAGNOSIS — I48.0 PAROXYSMAL ATRIAL FIBRILLATION (MULTI): ICD-10-CM

## 2024-09-09 DIAGNOSIS — R53.81 MALAISE AND FATIGUE: ICD-10-CM

## 2024-09-09 DIAGNOSIS — Z00.00 ROUTINE GENERAL MEDICAL EXAMINATION AT HEALTH CARE FACILITY: Primary | ICD-10-CM

## 2024-09-09 DIAGNOSIS — J45.40 MODERATE PERSISTENT ASTHMATIC BRONCHITIS WITHOUT COMPLICATION (HHS-HCC): ICD-10-CM

## 2024-09-09 DIAGNOSIS — I50.9 CHRONIC CONGESTIVE HEART FAILURE, UNSPECIFIED HEART FAILURE TYPE (MULTI): ICD-10-CM

## 2024-09-09 DIAGNOSIS — R53.83 MALAISE AND FATIGUE: ICD-10-CM

## 2024-09-09 DIAGNOSIS — I50.33 ACUTE ON CHRONIC DIASTOLIC (CONGESTIVE) HEART FAILURE (MULTI): ICD-10-CM

## 2024-09-09 DIAGNOSIS — Z79.01 ANTICOAGULATED ON COUMADIN: ICD-10-CM

## 2024-09-09 DIAGNOSIS — Z95.2 HEART VALVE TRANSPLANTED: ICD-10-CM

## 2024-09-09 PROBLEM — J96.11 HYPOXEMIC RESPIRATORY FAILURE, CHRONIC (MULTI): Status: RESOLVED | Noted: 2023-05-13 | Resolved: 2024-09-09

## 2024-09-09 LAB
25(OH)D3 SERPL-MCNC: 35 NG/ML (ref 30–100)
ALBUMIN SERPL BCP-MCNC: 4.2 G/DL (ref 3.4–5)
ALP SERPL-CCNC: 90 U/L (ref 33–136)
ALT SERPL W P-5'-P-CCNC: 15 U/L (ref 7–45)
ANION GAP SERPL CALC-SCNC: 10 MMOL/L (ref 10–20)
AST SERPL W P-5'-P-CCNC: 17 U/L (ref 9–39)
BILIRUB SERPL-MCNC: 1.8 MG/DL (ref 0–1.2)
BUN SERPL-MCNC: 17 MG/DL (ref 6–23)
CALCIUM SERPL-MCNC: 9.8 MG/DL (ref 8.6–10.6)
CHLORIDE SERPL-SCNC: 104 MMOL/L (ref 98–107)
CHOLEST SERPL-MCNC: 166 MG/DL (ref 0–199)
CHOLESTEROL/HDL RATIO: 4.2
CO2 SERPL-SCNC: 30 MMOL/L (ref 21–32)
CREAT SERPL-MCNC: 0.87 MG/DL (ref 0.5–1.05)
EGFRCR SERPLBLD CKD-EPI 2021: 71 ML/MIN/1.73M*2
ERYTHROCYTE [DISTWIDTH] IN BLOOD BY AUTOMATED COUNT: 14.7 % (ref 11.5–14.5)
GLUCOSE SERPL-MCNC: 242 MG/DL (ref 74–99)
HBA1C MFR BLD: 7.6 % (ref 4.2–6.5)
HCT VFR BLD AUTO: 38.9 % (ref 36–46)
HDLC SERPL-MCNC: 39.8 MG/DL
HGB BLD-MCNC: 12.1 G/DL (ref 12–16)
LDLC SERPL CALC-MCNC: 98 MG/DL
MCH RBC QN AUTO: 27.9 PG (ref 26–34)
MCHC RBC AUTO-ENTMCNC: 31.1 G/DL (ref 32–36)
MCV RBC AUTO: 90 FL (ref 80–100)
NON HDL CHOLESTEROL: 126 MG/DL (ref 0–149)
NRBC BLD-RTO: 0 /100 WBCS (ref 0–0)
PLATELET # BLD AUTO: 312 X10*3/UL (ref 150–450)
POC FINGERSTICK BLOOD GLUCOSE: 249 MG/DL (ref 70–100)
POC INR: 1.5 (ref 0.9–1.1)
POTASSIUM SERPL-SCNC: 4.2 MMOL/L (ref 3.5–5.3)
PROT SERPL-MCNC: 7.1 G/DL (ref 6.4–8.2)
RBC # BLD AUTO: 4.34 X10*6/UL (ref 4–5.2)
SODIUM SERPL-SCNC: 140 MMOL/L (ref 136–145)
TRIGL SERPL-MCNC: 142 MG/DL (ref 0–149)
TSH SERPL-ACNC: 3.36 MIU/L (ref 0.44–3.98)
VLDL: 28 MG/DL (ref 0–40)
WBC # BLD AUTO: 6.8 X10*3/UL (ref 4.4–11.3)

## 2024-09-09 PROCEDURE — 82962 GLUCOSE BLOOD TEST: CPT | Performed by: FAMILY MEDICINE

## 2024-09-09 PROCEDURE — 3077F SYST BP >= 140 MM HG: CPT | Performed by: FAMILY MEDICINE

## 2024-09-09 PROCEDURE — 3078F DIAST BP <80 MM HG: CPT | Performed by: FAMILY MEDICINE

## 2024-09-09 PROCEDURE — 1036F TOBACCO NON-USER: CPT | Performed by: FAMILY MEDICINE

## 2024-09-09 PROCEDURE — 3008F BODY MASS INDEX DOCD: CPT | Performed by: FAMILY MEDICINE

## 2024-09-09 PROCEDURE — 1160F RVW MEDS BY RX/DR IN RCRD: CPT | Performed by: FAMILY MEDICINE

## 2024-09-09 PROCEDURE — 84443 ASSAY THYROID STIM HORMONE: CPT

## 2024-09-09 PROCEDURE — 1124F ACP DISCUSS-NO DSCNMKR DOCD: CPT | Performed by: FAMILY MEDICINE

## 2024-09-09 PROCEDURE — 1170F FXNL STATUS ASSESSED: CPT | Performed by: FAMILY MEDICINE

## 2024-09-09 PROCEDURE — 83036 HEMOGLOBIN GLYCOSYLATED A1C: CPT | Mod: CLIA WAIVED TEST | Performed by: FAMILY MEDICINE

## 2024-09-09 PROCEDURE — 80061 LIPID PANEL: CPT

## 2024-09-09 PROCEDURE — 82306 VITAMIN D 25 HYDROXY: CPT

## 2024-09-09 PROCEDURE — G0439 PPPS, SUBSEQ VISIT: HCPCS | Performed by: FAMILY MEDICINE

## 2024-09-09 PROCEDURE — 85027 COMPLETE CBC AUTOMATED: CPT

## 2024-09-09 PROCEDURE — 99214 OFFICE O/P EST MOD 30 MIN: CPT | Performed by: FAMILY MEDICINE

## 2024-09-09 PROCEDURE — 85610 PROTHROMBIN TIME: CPT | Performed by: FAMILY MEDICINE

## 2024-09-09 PROCEDURE — 1159F MED LIST DOCD IN RCRD: CPT | Performed by: FAMILY MEDICINE

## 2024-09-09 PROCEDURE — 80053 COMPREHEN METABOLIC PANEL: CPT

## 2024-09-09 PROCEDURE — 3051F HG A1C>EQUAL 7.0%<8.0%: CPT | Performed by: FAMILY MEDICINE

## 2024-09-09 PROCEDURE — 1126F AMNT PAIN NOTED NONE PRSNT: CPT | Performed by: FAMILY MEDICINE

## 2024-09-09 ASSESSMENT — ACTIVITIES OF DAILY LIVING (ADL)
TAKING_MEDICATION: INDEPENDENT
GROCERY_SHOPPING: INDEPENDENT
BATHING: INDEPENDENT
DOING_HOUSEWORK: INDEPENDENT
DRESSING: INDEPENDENT
MANAGING_FINANCES: INDEPENDENT

## 2024-09-09 ASSESSMENT — PATIENT HEALTH QUESTIONNAIRE - PHQ9
1. LITTLE INTEREST OR PLEASURE IN DOING THINGS: NOT AT ALL
SUM OF ALL RESPONSES TO PHQ9 QUESTIONS 1 AND 2: 0
2. FEELING DOWN, DEPRESSED OR HOPELESS: NOT AT ALL

## 2024-09-09 ASSESSMENT — ENCOUNTER SYMPTOMS
DEPRESSION: 0
LOSS OF SENSATION IN FEET: 0
OCCASIONAL FEELINGS OF UNSTEADINESS: 1

## 2024-09-09 ASSESSMENT — PAIN SCALES - GENERAL: PAINLEVEL: 0-NO PAIN

## 2024-09-09 NOTE — ASSESSMENT & PLAN NOTE
Orders:    CBC    Comprehensive Metabolic Panel    Lipid Panel    POCT fingerstick glucose manually resulted    POCT Glycosylated Hemoglobin (HGB A1C) docked device    Thyroid Stimulating Hormone

## 2024-09-09 NOTE — PROGRESS NOTES
"Subjective   Reason for Visit: Genet Wilks is an 74 y.o. female here for a Medicare Wellness visit.     Past Medical, Surgical, and Family History reviewed and updated in chart.    Reviewed all medications by prescribing practitioner or clinical pharmacist (such as prescriptions, OTCs, herbal therapies and supplements) and documented in the medical record.    HPI  : Patient in for Medicare wellness exam.  Follows with Dr Day for Cardiology.  Patient will answer the questions as presented by the medical assistant for her Medicare wellness exam and she does not have a living will or medical power of .  Patient will have complete blood work, blood sugar and A1c.  She is diabetic but has gained some weight recently and has not been following a close diet.  She also is on Coumadin daily and this is monitored by cardiology.    Patient Care Team:  Mitesh Smims DO as PCP - General  Mitesh Simms DO as PCP - [employer] PCP  Lul Day MD as Consulting Physician (Cardiology)     Review of Systems  : 10 systems were reviewed and the information is included in the HPI and no additional review of systems is indicated.    Objective   Vitals:  /75   Pulse 86   Temp 36.6 °C (97.9 °F)   Resp 18   Ht 1.575 m (5' 2\")   Wt 99.8 kg (220 lb)   SpO2 95%   BMI 40.24 kg/m²       Physical Exam  Vitals and nursing note reviewed.   Constitutional:       Appearance: Normal appearance. She is obese.      Comments: Patient is alert and oriented x3.   No acute distress and patient is to get back on her diet and lose some weight.   HENT:      Head: Normocephalic.      Right Ear: Tympanic membrane and external ear normal.      Left Ear: Tympanic membrane and external ear normal.      Ears:      Comments: Ears are patent bilaterally and TMs are clear.     Nose: Congestion and rhinorrhea present.      Comments: Nasal rhinorrhea from seasonal allergies and also recurrent sinus congestion.    "  Mouth/Throat:      Mouth: Mucous membranes are moist.      Pharynx: Oropharynx is clear.      Comments: Mouth is moist, tongue is midline.  No posterior pharyngeal erythema.  Eyes:      Extraocular Movements: Extraocular movements intact.      Conjunctiva/sclera: Conjunctivae normal.      Pupils: Pupils are equal, round, and reactive to light.      Comments: No visual disturbance, does have her eyes examined once a year.   Neck:      Comments: Restricted range of motion cervical spine due to body habitus.  Also has arthritis and spasm in the cervical region.  No carotid bruits, no thyromegaly, no cervical adenopathy.    Cardiovascular:      Rate and Rhythm: Normal rate and regular rhythm.      Pulses: Normal pulses.      Heart sounds: Normal heart sounds.      Comments: Patient follows with cardiology and has a history of previous valvular heart disease from rheumatic fever.  She did have surgical repair of her valve and is on Coumadin.  Patient denies chest pain and no palpitations.  Heart rhythm is stable S1 and S2 are noted.  Pulmonary:      Effort: Pulmonary effort is normal.      Breath sounds: Rhonchi present.      Comments: Long history of asthmatic bronchitis and does have a home nebulizer machine.  She does have some chronic coughing from allergies and asthmatic bronchitis.  Lungs with few scattered expiratory rhonchi but no wheezing.  Abdominal:      General: Bowel sounds are normal.      Palpations: Abdomen is soft.      Comments: Abdomen is soft and obese and non tender.   No flank tenderness.  No suprapubic pain.    No abdominal guarding and no rebound tenderness.   Genitourinary:     Comments: Patient denies dysuria, no hematuria, no nocturia, denies flank pain.  Patient needs referral for a follow-up mammogram that she was supposed to have at 6 months previous.  Musculoskeletal:         General: Tenderness present.      Cervical back: Tenderness present.      Comments: Patient has arthritis in most  of her joints.  Osteoarthritis of the knees and hips.  Unsteady gait due to poor conditioning.  Chronic lumbosacral degenerative disc disease and cervical arthritis.  Mild bilateral ankle edema and is on diuretics.   Skin:     General: Skin is warm.      Findings: Bruising present.      Comments: Patient does have dry skin and bruises easily due to her Coumadin.   Neurological:      General: No focal deficit present.      Mental Status: She is alert and oriented to person, place, and time. Mental status is at baseline.      Sensory: Sensory deficit present.      Coordination: Coordination abnormal.      Comments: Patient has paresthesias in the lower extremities from lumbosacral disc disease and arthritis.  She does have an unsteady gait and has to be cautious with her coordination so she does not fall.  Some weakness in the lower extremities and also poor conditioning.   Psychiatric:         Behavior: Behavior normal.         Thought Content: Thought content normal.         Judgment: Judgment normal.      Comments: Patient has increased anxiety due to several deaths in the family over the past year.  Her  has also been  for about 6 years..  Thought content and judgment are stable.  No signs of vascular dementia.  Behavior is normal.     PLAN : Patient is a 74-year-old diabetic female who was evaluated today for a Medicare wellness physical.  She did answer the questions as presented by the medical assistant.  She does not have a living will or medical power of .  Her blood sugar today was 249 and her A1c was 7.6%.  That is up from 6.9% last visit and she knows she has to get back on her diet and lose some weight.  Her INR today was 1.5.  She does follow closely with cardiology and does go to the Coumadin clinic.  She will be notified of her other blood results in 3 days and further recommendations will be made at that time.  I also encouraged her to try to watch her diet closely and lose some  weight by her next visit.  Further recommendations will be made after review of her blood results.    Assessment & Plan  Type 2 diabetes mellitus without complication, without long-term current use of insulin (Multi)    Orders:    CBC    Comprehensive Metabolic Panel    Lipid Panel    POCT fingerstick glucose manually resulted    POCT Glycosylated Hemoglobin (HGB A1C) docked device    Thyroid Stimulating Hormone    Pure hypercholesterolemia    Orders:    CBC    Comprehensive Metabolic Panel    Lipid Panel    POCT fingerstick glucose manually resulted    POCT Glycosylated Hemoglobin (HGB A1C) docked device    Thyroid Stimulating Hormone    Hypothyroidism, unspecified type    Orders:    CBC    Comprehensive Metabolic Panel    Lipid Panel    POCT fingerstick glucose manually resulted    POCT Glycosylated Hemoglobin (HGB A1C) docked device    Thyroid Stimulating Hormone    Primary hypertension    Orders:    CBC    Comprehensive Metabolic Panel    Lipid Panel    POCT fingerstick glucose manually resulted    POCT Glycosylated Hemoglobin (HGB A1C) docked device    Thyroid Stimulating Hormone    Malaise and fatigue    Orders:    CBC    Comprehensive Metabolic Panel    Lipid Panel    POCT fingerstick glucose manually resulted    POCT Glycosylated Hemoglobin (HGB A1C) docked device    Thyroid Stimulating Hormone    Vitamin D deficiency    Orders:    Vitamin D 25-Hydroxy,Total (for eval of Vitamin D levels)    Anticoagulated on Coumadin    Orders:    POCT INR manually resulted    Routine general medical examination at health care facility         Chronic congestive heart failure, unspecified heart failure type (Multi)          Encounter for screening mammogram for malignant neoplasm of breast    Orders:    BI mammo bilateral screening tomosynthesis; Future    Moderate persistent asthmatic bronchitis without complication ([facility])         Heart valve transplanted         Mechanical heart valve present         Paroxysmal atrial  fibrillation (Multi)         Acute on chronic diastolic (congestive) heart failure (Multi)

## 2024-09-10 NOTE — RESULT ENCOUNTER NOTE
Kidney and liver function are stable       white blood cell count is normal        red blood cell count is stable   the anemia is back to normal.        Cholesterol is normal at 166      triglycerides are normal at 142    vitamin D is normal at 35    thyroid function is stable      blood work looks stable just try to get the diabetes under better control and watch the diet.

## 2024-09-13 ENCOUNTER — APPOINTMENT (OUTPATIENT)
Dept: RADIOLOGY | Facility: CLINIC | Age: 74
End: 2024-09-13
Payer: MEDICARE

## 2024-09-18 DIAGNOSIS — M25.472 ANKLE EDEMA, BILATERAL: ICD-10-CM

## 2024-09-18 DIAGNOSIS — M25.471 ANKLE EDEMA, BILATERAL: ICD-10-CM

## 2024-09-18 RX ORDER — AMLODIPINE BESYLATE 5 MG/1
5 TABLET ORAL DAILY
Qty: 90 TABLET | Refills: 3 | Status: SHIPPED | OUTPATIENT
Start: 2024-09-18

## 2024-09-19 DIAGNOSIS — Z98.818 OTHER DENTAL PROCEDURE STATUS: ICD-10-CM

## 2024-09-19 DIAGNOSIS — Z79.2 NEED FOR ANTIBIOTIC PROPHYLAXIS FOR DENTAL PROCEDURE: Primary | ICD-10-CM

## 2024-09-19 RX ORDER — AZITHROMYCIN 250 MG/1
TABLET, FILM COATED ORAL
Qty: 6 TABLET | Refills: 0 | Status: SHIPPED | OUTPATIENT
Start: 2024-09-19 | End: 2024-09-24

## 2024-09-26 ENCOUNTER — ANTICOAGULATION - WARFARIN VISIT (OUTPATIENT)
Dept: PHARMACY | Facility: CLINIC | Age: 74
End: 2024-09-26
Payer: MEDICARE

## 2024-09-26 DIAGNOSIS — I48.0 PAROXYSMAL ATRIAL FIBRILLATION (MULTI): ICD-10-CM

## 2024-09-26 DIAGNOSIS — Z95.2 MECHANICAL HEART VALVE PRESENT: Primary | ICD-10-CM

## 2024-09-26 LAB
POC INR: 4.5
POC PROTHROMBIN TIME: NORMAL

## 2024-09-26 PROCEDURE — 99212 OFFICE O/P EST SF 10 MIN: CPT

## 2024-09-26 PROCEDURE — 85610 PROTHROMBIN TIME: CPT | Mod: QW

## 2024-09-26 NOTE — PROGRESS NOTES
Coumadin Clinic Visit Note  Patient verified warfarin dose  No missed doses  Patient report small bruise on the back of shoulder and arms but they are healing on own.  Patient had a zpak for 5 days and finished treatment on 9/25/24, which may have caused and increase in the INR  No changes to medications medications otherwise  Consistent dietary green intake  Anticipated procedures for dental extractions on 11/11/24 and 12/2/24. Discussed with patient that patient has to hold warfarin if more than 3 teeth pulled. Dentist is aware patient is on warfarin and was not instructed to hold.   INR Supratherapeutic today at 4.5  Patient instructed to hold one dose and will not adjusted due to patient finishing zpak therapy and expecting number to go down.   Reached out to the patient's dentist office due to patient having a scheduled extraction. Per the dentisit's office patient is having 2 teeth extracted on 11/11/24 and dental bridge placement on 12/2/24. They would like the patient to hold warfarin for two days but are going to defer to Dr. Day to see what he would like to do. I reached out to Dr. Day today and am awaiting his response.   Next appointment 2 weeks

## 2024-10-10 ENCOUNTER — ANTICOAGULATION - WARFARIN VISIT (OUTPATIENT)
Dept: PHARMACY | Facility: CLINIC | Age: 74
End: 2024-10-10
Payer: MEDICARE

## 2024-10-10 DIAGNOSIS — Z95.2 MECHANICAL HEART VALVE PRESENT: Primary | ICD-10-CM

## 2024-10-10 DIAGNOSIS — I48.0 PAROXYSMAL ATRIAL FIBRILLATION (MULTI): ICD-10-CM

## 2024-10-10 LAB
POC INR: 2
POC PROTHROMBIN TIME: NORMAL

## 2024-10-10 PROCEDURE — 99212 OFFICE O/P EST SF 10 MIN: CPT | Performed by: PHARMACIST

## 2024-10-10 PROCEDURE — 85610 PROTHROMBIN TIME: CPT | Mod: QW | Performed by: PHARMACIST

## 2024-10-10 NOTE — PROGRESS NOTES
Verified current dose with pt.    No new meds or med changes since last visit.  Possibly missed a dose on the 5th  She reports recently she is more tired and has no appetite   She reports she has had a little extra greens since last visit  Pt denies unusual bleed/bruise.  Tooth extraction is scheduled for November 11th (two teeth)  Boost with 1 tablet (5mg) then continue same dose   check again in 2 weeks.

## 2024-10-24 ENCOUNTER — ANTICOAGULATION - WARFARIN VISIT (OUTPATIENT)
Dept: PHARMACY | Facility: CLINIC | Age: 74
End: 2024-10-24
Payer: MEDICARE

## 2024-10-24 DIAGNOSIS — Z95.2 MECHANICAL HEART VALVE PRESENT: Primary | ICD-10-CM

## 2024-10-24 DIAGNOSIS — I48.0 PAROXYSMAL ATRIAL FIBRILLATION (MULTI): ICD-10-CM

## 2024-10-24 LAB
POC INR: 5.1
POC PROTHROMBIN TIME: NORMAL

## 2024-10-24 PROCEDURE — 85610 PROTHROMBIN TIME: CPT | Mod: QW | Performed by: PHARMACIST

## 2024-10-24 PROCEDURE — 99212 OFFICE O/P EST SF 10 MIN: CPT | Performed by: PHARMACIST

## 2024-10-24 NOTE — PROGRESS NOTES
Verified current dose with pt.    No new meds or med changes since last visit.  Pt denies unusual bleed/bruise.  Dental Procedure November 11th (dentist said no more than 3 teeth at 1 time) and again December 2nd. She will be on antibiotics afterwards but is unsure what they will give her. Will not hold Warfarin for procedure's  Denied alcohol use, ibuprofen or tylenol and no recent sickness  She is only eating about 1 meal a day since last visit, she just has no appetite and overall she just isn't eating as much greens. She has lost about 7 lbs. Encouraged her to try to eat at least twice a day and she agreed.   Will hold two doses (10/24 and 10/25), and will decrease her dose by 12.5 % since she has had elevated INR's and is eating much less. Will re-check in 2 weeks.

## 2024-10-24 NOTE — PATIENT INSTRUCTIONS
NO Warfarin today and tomorrow (10/25) then take 1/2 tablet every day during the week until seen back in the clinic.

## 2024-10-29 DIAGNOSIS — J21.9 ACUTE BRONCHIOLITIS DUE TO UNSPECIFIED ORGANISM: Primary | ICD-10-CM

## 2024-10-29 RX ORDER — DOXYCYCLINE 100 MG/1
100 CAPSULE ORAL 2 TIMES DAILY
Qty: 20 CAPSULE | Refills: 0 | Status: SHIPPED | OUTPATIENT
Start: 2024-10-29 | End: 2024-11-08

## 2024-10-29 RX ORDER — BROMPHENIRAMINE MALEATE, PSEUDOEPHEDRINE HYDROCHLORIDE, AND DEXTROMETHORPHAN HYDROBROMIDE 2; 30; 10 MG/5ML; MG/5ML; MG/5ML
5 SYRUP ORAL 4 TIMES DAILY PRN
Qty: 120 ML | Refills: 0 | Status: SHIPPED | OUTPATIENT
Start: 2024-10-29 | End: 2024-11-08

## 2024-10-31 ENCOUNTER — TELEPHONE (OUTPATIENT)
Dept: PHARMACY | Facility: CLINIC | Age: 74
End: 2024-10-31
Payer: MEDICARE

## 2024-11-04 ENCOUNTER — ANTICOAGULATION - WARFARIN VISIT (OUTPATIENT)
Dept: PHARMACY | Facility: CLINIC | Age: 74
End: 2024-11-04
Payer: MEDICARE

## 2024-11-04 DIAGNOSIS — I48.0 PAROXYSMAL ATRIAL FIBRILLATION (MULTI): ICD-10-CM

## 2024-11-04 DIAGNOSIS — Z95.2 MECHANICAL HEART VALVE PRESENT: Primary | ICD-10-CM

## 2024-11-04 LAB
POC INR: 1.8
POC PROTHROMBIN TIME: NORMAL

## 2024-11-04 PROCEDURE — 85610 PROTHROMBIN TIME: CPT | Mod: QW

## 2024-11-04 PROCEDURE — 99212 OFFICE O/P EST SF 10 MIN: CPT

## 2024-11-04 NOTE — PROGRESS NOTES
No bleeding or unusual bruising.  Medications and doses verified.  Dental procedure 11/11/24 and will not be holding Warfarin.  INR=1.8   Plan: Weekly dose increased by 14.3% (1/2 tab).  Follow up INR check in 1-2 weeks.

## 2024-11-07 ENCOUNTER — APPOINTMENT (OUTPATIENT)
Dept: PHARMACY | Facility: CLINIC | Age: 74
End: 2024-11-07
Payer: MEDICARE

## 2024-11-07 PROBLEM — Z95.2 HEART VALVE TRANSPLANTED: Status: RESOLVED | Noted: 2023-10-04 | Resolved: 2024-11-07

## 2024-11-07 PROBLEM — E78.5 HYPERLIPIDEMIA: Status: RESOLVED | Noted: 2023-10-04 | Resolved: 2024-11-07

## 2024-11-08 ENCOUNTER — ANTICOAGULATION - WARFARIN VISIT (OUTPATIENT)
Dept: PHARMACY | Facility: CLINIC | Age: 74
End: 2024-11-08
Payer: MEDICARE

## 2024-11-08 DIAGNOSIS — I48.0 PAROXYSMAL ATRIAL FIBRILLATION (MULTI): ICD-10-CM

## 2024-11-08 DIAGNOSIS — Z95.2 MECHANICAL HEART VALVE PRESENT: Primary | ICD-10-CM

## 2024-11-08 NOTE — PROGRESS NOTES
Spoke with patient regarding bridging therapy - she stated she is familiar with bridging procedures and has used Lovenox shots in the past.     Based on her weight of 210lbs (99.3kg) - confirmed via telephone with patient, and listed in chart from 9/5/24 and Scr of 0.87mg/dL from  9/9/24- patient's dose of Lovenox would be 100mg subcutaneous BID (Crcl manually calculated to be 57mL/min     Per Dr. Jason Cardona, DDS - patient is to HOLD warfarin 5 days prior to procedure. Patient to be bridged with Lovenox (approved per Dr. Day), and patient needs to be on clindamycin 600mg PO x 1 dose prior to procedure.     Bridging sheet is not yet filled out, as we do not know date of procedure. Patient to keep us updated on date.     Lovenox Rx to be escribed to [facility] in [facility] ([phone])     Radha Burdick, PharmD, BCPS   11/8/2024 3:10 PM

## 2024-11-13 ENCOUNTER — DOCUMENTATION (OUTPATIENT)
Dept: PHARMACY | Facility: CLINIC | Age: 74
End: 2024-11-13
Payer: MEDICARE

## 2024-11-13 NOTE — PROGRESS NOTES
Patient follows with the [facility] for monitoring and adjustments to her warfarin dose.     Patient is planning to have a dental procedure done with an unknown procedure date and is to be bridged with Lovenox while holding warfarin for the procedure.    11/13/24 - called the dentist office to confirm procedure date.  procedure has not yet been scheduled  dentist office will be calling the patient tomorrow (11/14/24) to get the procedure scheduled and they will inform the patient that she will need to call us to let us know the date  Dentist office said the procedure will likely not be until next year    Once patient calls us with the procedure date, we will need to make her bridging schedule. Patients next anticoagulation appointment is 11/15/24.    Oly NewberryD

## 2024-11-15 ENCOUNTER — ANTICOAGULATION - WARFARIN VISIT (OUTPATIENT)
Dept: PHARMACY | Facility: CLINIC | Age: 74
End: 2024-11-15
Payer: MEDICARE

## 2024-11-15 DIAGNOSIS — I48.0 PAROXYSMAL ATRIAL FIBRILLATION (MULTI): ICD-10-CM

## 2024-11-15 DIAGNOSIS — Z95.2 MECHANICAL HEART VALVE PRESENT: Primary | ICD-10-CM

## 2024-11-15 LAB
POC INR: 5.3
POC PROTHROMBIN TIME: NORMAL

## 2024-11-15 PROCEDURE — 99212 OFFICE O/P EST SF 10 MIN: CPT

## 2024-11-15 PROCEDURE — 85610 PROTHROMBIN TIME: CPT | Mod: QW

## 2024-11-15 NOTE — PROGRESS NOTES
No bleeding or unusual bruising.  Medications and doses verified.  No scheduled procedures at this time.  INR=5.3   Pt admits to eating much less and not eating greens lately.  Plan: Hold 2 doses and then continue same weekly dose.  Advised pt to eat 2-3 servings of green vegetables per week.  Follow up INR check in 1 week.

## 2024-11-20 ENCOUNTER — ANTICOAGULATION - WARFARIN VISIT (OUTPATIENT)
Dept: PHARMACY | Facility: CLINIC | Age: 74
End: 2024-11-20
Payer: MEDICARE

## 2024-11-20 DIAGNOSIS — Z95.2 MECHANICAL HEART VALVE PRESENT: Primary | ICD-10-CM

## 2024-11-20 DIAGNOSIS — I48.0 PAROXYSMAL ATRIAL FIBRILLATION (MULTI): ICD-10-CM

## 2024-11-20 LAB
POC INR: 2.5
POC PROTHROMBIN TIME: NORMAL

## 2024-11-20 PROCEDURE — 85610 PROTHROMBIN TIME: CPT | Mod: QW

## 2024-11-20 PROCEDURE — 99212 OFFICE O/P EST SF 10 MIN: CPT

## 2024-11-20 NOTE — PROGRESS NOTES
Coumadin Clinic Visit Note    HPI  Genet Wilks is a 74 y.o. female with history of Atrial Fibrillation/Atrial Flutter and Mechanical Heart Valve  who presents today for anticoagulation monitoring and adjustment. Last INR 5.3 on 11/15 (~1 week follow up) potentially due to eating less greens than usual.  Current dose: 5 mg (5 mg x 1) every Mon; 2.5 mg (5 mg x 0.5) all other days   INR goal: 2.5-3.5    Subjective  Patient verified warfarin dose   No missed doses  No unusual bruising or bleeding  No changes to medications  Consistent dietary green intake  No recent hospital admissions     Patient is planning to have a dental procedure done with an unknown procedure date and is to be bridged with Lovenox while holding warfarin for the procedure. Dental procedure was originally scheduled, but was cancelled as she is waiting for insurance approval. Patient will call us with the specific date of procedure. Once patient calls us with the procedure date, we will need to make her bridging schedule. Patients next anticoagulation appointment is 12/12/24.    Assessment: INR of 2.5 is Therapeutic today for goal range of 2.5-3.5.  Plan: No changes to warfarin dose today  Follow Up: Next appointment 3 weeks    Arianne Varghese, PharmD

## 2024-12-05 ENCOUNTER — APPOINTMENT (OUTPATIENT)
Dept: CARDIOLOGY | Facility: CLINIC | Age: 74
End: 2024-12-05
Payer: MEDICARE

## 2024-12-13 ENCOUNTER — HOSPITAL ENCOUNTER (EMERGENCY)
Facility: HOSPITAL | Age: 74
Discharge: HOME | End: 2024-12-13
Attending: EMERGENCY MEDICINE
Payer: MEDICARE

## 2024-12-13 ENCOUNTER — APPOINTMENT (OUTPATIENT)
Dept: RADIOLOGY | Facility: HOSPITAL | Age: 74
End: 2024-12-13
Payer: MEDICARE

## 2024-12-13 ENCOUNTER — HOSPITAL ENCOUNTER (OUTPATIENT)
Dept: CARDIOLOGY | Facility: HOSPITAL | Age: 74
Discharge: HOME | End: 2024-12-13
Payer: MEDICARE

## 2024-12-13 VITALS
BODY MASS INDEX: 38.64 KG/M2 | DIASTOLIC BLOOD PRESSURE: 63 MMHG | OXYGEN SATURATION: 98 % | SYSTOLIC BLOOD PRESSURE: 140 MMHG | TEMPERATURE: 97.5 F | WEIGHT: 210 LBS | RESPIRATION RATE: 16 BRPM | HEIGHT: 62 IN | HEART RATE: 76 BPM

## 2024-12-13 DIAGNOSIS — J44.1 COPD EXACERBATION (MULTI): Primary | ICD-10-CM

## 2024-12-13 DIAGNOSIS — R06.02 SHORTNESS OF BREATH: ICD-10-CM

## 2024-12-13 LAB
ALBUMIN SERPL BCP-MCNC: 4.1 G/DL (ref 3.4–5)
ALP SERPL-CCNC: 104 U/L (ref 33–136)
ALT SERPL W P-5'-P-CCNC: 18 U/L (ref 7–45)
ANION GAP SERPL CALC-SCNC: 12 MMOL/L (ref 10–20)
AST SERPL W P-5'-P-CCNC: 16 U/L (ref 9–39)
BASOPHILS # BLD AUTO: 0.07 X10*3/UL (ref 0–0.1)
BASOPHILS NFR BLD AUTO: 0.9 %
BILIRUB SERPL-MCNC: 2.1 MG/DL (ref 0–1.2)
BNP SERPL-MCNC: 178 PG/ML (ref 0–99)
BUN SERPL-MCNC: 13 MG/DL (ref 6–23)
CALCIUM SERPL-MCNC: 9.3 MG/DL (ref 8.6–10.3)
CARDIAC TROPONIN I PNL SERPL HS: 14 NG/L (ref 0–13)
CARDIAC TROPONIN I PNL SERPL HS: 15 NG/L (ref 0–13)
CHLORIDE SERPL-SCNC: 104 MMOL/L (ref 98–107)
CO2 SERPL-SCNC: 28 MMOL/L (ref 21–32)
CREAT SERPL-MCNC: 0.86 MG/DL (ref 0.5–1.05)
EGFRCR SERPLBLD CKD-EPI 2021: 72 ML/MIN/1.73M*2
EOSINOPHIL # BLD AUTO: 0.14 X10*3/UL (ref 0–0.4)
EOSINOPHIL NFR BLD AUTO: 1.8 %
ERYTHROCYTE [DISTWIDTH] IN BLOOD BY AUTOMATED COUNT: 15.9 % (ref 11.5–14.5)
FLUAV RNA RESP QL NAA+PROBE: NOT DETECTED
FLUBV RNA RESP QL NAA+PROBE: NOT DETECTED
GLUCOSE SERPL-MCNC: 173 MG/DL (ref 74–99)
HCT VFR BLD AUTO: 38.7 % (ref 36–46)
HGB BLD-MCNC: 12.5 G/DL (ref 12–16)
IMM GRANULOCYTES # BLD AUTO: 0.03 X10*3/UL (ref 0–0.5)
IMM GRANULOCYTES NFR BLD AUTO: 0.4 % (ref 0–0.9)
INR PPP: 2.8 (ref 0.9–1.1)
LYMPHOCYTES # BLD AUTO: 1.92 X10*3/UL (ref 0.8–3)
LYMPHOCYTES NFR BLD AUTO: 25 %
MCH RBC QN AUTO: 28.5 PG (ref 26–34)
MCHC RBC AUTO-ENTMCNC: 32.3 G/DL (ref 32–36)
MCV RBC AUTO: 88 FL (ref 80–100)
MONOCYTES # BLD AUTO: 0.56 X10*3/UL (ref 0.05–0.8)
MONOCYTES NFR BLD AUTO: 7.3 %
NEUTROPHILS # BLD AUTO: 4.95 X10*3/UL (ref 1.6–5.5)
NEUTROPHILS NFR BLD AUTO: 64.6 %
NRBC BLD-RTO: 0 /100 WBCS (ref 0–0)
PLATELET # BLD AUTO: 255 X10*3/UL (ref 150–450)
POTASSIUM SERPL-SCNC: 3.7 MMOL/L (ref 3.5–5.3)
PROT SERPL-MCNC: 7.4 G/DL (ref 6.4–8.2)
PROTHROMBIN TIME: 31.5 SECONDS (ref 9.8–12.8)
RBC # BLD AUTO: 4.38 X10*6/UL (ref 4–5.2)
RSV RNA RESP QL NAA+PROBE: NOT DETECTED
SARS-COV-2 RNA RESP QL NAA+PROBE: NOT DETECTED
SODIUM SERPL-SCNC: 140 MMOL/L (ref 136–145)
WBC # BLD AUTO: 7.7 X10*3/UL (ref 4.4–11.3)

## 2024-12-13 PROCEDURE — 71046 X-RAY EXAM CHEST 2 VIEWS: CPT | Performed by: RADIOLOGY

## 2024-12-13 PROCEDURE — 2500000001 HC RX 250 WO HCPCS SELF ADMINISTERED DRUGS (ALT 637 FOR MEDICARE OP)

## 2024-12-13 PROCEDURE — 36415 COLL VENOUS BLD VENIPUNCTURE: CPT

## 2024-12-13 PROCEDURE — 99284 EMERGENCY DEPT VISIT MOD MDM: CPT | Mod: 25 | Performed by: EMERGENCY MEDICINE

## 2024-12-13 PROCEDURE — 85610 PROTHROMBIN TIME: CPT

## 2024-12-13 PROCEDURE — 84484 ASSAY OF TROPONIN QUANT: CPT

## 2024-12-13 PROCEDURE — 93005 ELECTROCARDIOGRAM TRACING: CPT

## 2024-12-13 PROCEDURE — 2500000002 HC RX 250 W HCPCS SELF ADMINISTERED DRUGS (ALT 637 FOR MEDICARE OP, ALT 636 FOR OP/ED)

## 2024-12-13 PROCEDURE — 94640 AIRWAY INHALATION TREATMENT: CPT

## 2024-12-13 PROCEDURE — 83880 ASSAY OF NATRIURETIC PEPTIDE: CPT

## 2024-12-13 PROCEDURE — 87637 SARSCOV2&INF A&B&RSV AMP PRB: CPT

## 2024-12-13 PROCEDURE — 80053 COMPREHEN METABOLIC PANEL: CPT

## 2024-12-13 PROCEDURE — 71046 X-RAY EXAM CHEST 2 VIEWS: CPT

## 2024-12-13 PROCEDURE — 85025 COMPLETE CBC W/AUTO DIFF WBC: CPT

## 2024-12-13 RX ORDER — AMLODIPINE BESYLATE 5 MG/1
5 TABLET ORAL ONCE
Status: COMPLETED | OUTPATIENT
Start: 2024-12-13 | End: 2024-12-13

## 2024-12-13 RX ORDER — IPRATROPIUM BROMIDE AND ALBUTEROL SULFATE 2.5; .5 MG/3ML; MG/3ML
3 SOLUTION RESPIRATORY (INHALATION)
Status: DISCONTINUED | OUTPATIENT
Start: 2024-12-13 | End: 2024-12-13

## 2024-12-13 RX ORDER — BENZONATATE 100 MG/1
100 CAPSULE ORAL ONCE
Status: COMPLETED | OUTPATIENT
Start: 2024-12-13 | End: 2024-12-13

## 2024-12-13 RX ORDER — BENZONATATE 100 MG/1
100 CAPSULE ORAL 3 TIMES DAILY PRN
Status: DISCONTINUED | OUTPATIENT
Start: 2024-12-13 | End: 2024-12-13

## 2024-12-13 RX ORDER — IPRATROPIUM BROMIDE AND ALBUTEROL SULFATE 2.5; .5 MG/3ML; MG/3ML
3 SOLUTION RESPIRATORY (INHALATION) ONCE
Status: COMPLETED | OUTPATIENT
Start: 2024-12-13 | End: 2024-12-13

## 2024-12-13 RX ORDER — PREDNISONE 20 MG/1
40 TABLET ORAL DAILY
Qty: 10 TABLET | Refills: 0 | Status: SHIPPED | OUTPATIENT
Start: 2024-12-13 | End: 2024-12-18

## 2024-12-13 ASSESSMENT — LIFESTYLE VARIABLES
EVER HAD A DRINK FIRST THING IN THE MORNING TO STEADY YOUR NERVES TO GET RID OF A HANGOVER: NO
TOTAL SCORE: 0
EVER FELT BAD OR GUILTY ABOUT YOUR DRINKING: NO
HAVE YOU EVER FELT YOU SHOULD CUT DOWN ON YOUR DRINKING: NO
HAVE PEOPLE ANNOYED YOU BY CRITICIZING YOUR DRINKING: NO

## 2024-12-13 ASSESSMENT — PAIN - FUNCTIONAL ASSESSMENT: PAIN_FUNCTIONAL_ASSESSMENT: 0-10

## 2024-12-13 ASSESSMENT — COLUMBIA-SUICIDE SEVERITY RATING SCALE - C-SSRS
6. HAVE YOU EVER DONE ANYTHING, STARTED TO DO ANYTHING, OR PREPARED TO DO ANYTHING TO END YOUR LIFE?: NO
2. HAVE YOU ACTUALLY HAD ANY THOUGHTS OF KILLING YOURSELF?: NO
1. IN THE PAST MONTH, HAVE YOU WISHED YOU WERE DEAD OR WISHED YOU COULD GO TO SLEEP AND NOT WAKE UP?: NO

## 2024-12-13 ASSESSMENT — PAIN DESCRIPTION - LOCATION: LOCATION: CHEST

## 2024-12-13 ASSESSMENT — PAIN SCALES - GENERAL: PAINLEVEL_OUTOF10: 5 - MODERATE PAIN

## 2024-12-13 NOTE — ED TRIAGE NOTES
Pt presents to ED via private auto for worsening shortness of breath, cough and chest pain with inspiration since Sunday.  Pt states she developed chest palpitations today. Pt denies fevers and chills.

## 2024-12-13 NOTE — DISCHARGE INSTRUCTIONS
You were seen in the emergency department today for shortness of breath and believed to have a COPD exacerbation. Please call your primary care doctor upon arrival home to make a follow-up appointment within one week. Take all inhalers (e.g. Proventil) and steroids (e.g. Prednisone) as well as antibiotics as discussed and if prescribed by your doctor. If your cough increases in frequency and severity and/or you have increased shortness of breath call your doctor. If you develop fever, chills, night sweats, malaise, and/or change in mental status call your doctor. Increase your activity as tolerated. Do not stay in bed all day. Please do not smoke. Please continue using oxygen as home, if indicated.

## 2024-12-13 NOTE — ED PROVIDER NOTES
Emergency Department Provider Note        History of Present Illness     History provided by: Patient  Limitations to History: None  External Records Reviewed with Brief Summary: None    HPI:  Genet Wilks is a 74 y.o. female history of mitral valve replacement on warfarin, hypertension, hyperlipidemia, hypothyroidism, NSTEMI, CHF, mucopurulent bronchitis, COPD presenting with a chief complaint of cough and shortness of breath.  Patient states that she started having symptoms on Sunday which she thought was a common cold however symptoms have worsened.  She is endorsing a cough with thick green mucus alternating dark brown mucus, right posterior rib pain that happens when coughing and a chest tightness.  Patient states that she took a nebulized and tailor treatment yesterday without relief.  Patient had a fever earlier in the week but does not feel like she has 1 now over the last few days.  No medications taken prior to arrival.  Patient states that she is on a diuretic, has not been gaining weight or increasing lower extremity edema.    Physical Exam   Triage vitals:  T 36.4 °C (97.5 °F)  HR 93  BP (!) 219/79  RR 20  O2 96 % None (Room air)    General: Awake, alert, in no acute distress  Eyes: Gaze conjugate.  No scleral icterus or injection  HENT: Normo-cephalic, atraumatic. No stridor  CV: Regular rate, regular rhythm. Radial pulses 2+ bilaterally  Resp: Breathing non-labored, speaking in full sentences.  Expiratory wheezing in the left lung fields increased compared to the right.  Harsh cough noted.  GI: Soft, non-distended, non-tender. No rebound or guarding.  MSK/Extremities: No gross bony deformities. Moving all extremities  Skin: Warm. Appropriate color.  No significant lower extremity edema.  Neuro: Alert. Oriented. Face symmetric. Speech is fluent.  Gross strength and sensation intact in b/l UE and LEs  Psych: Appropriate mood and affect    Medical Decision Making & ED Course   Medical Decision  Makin y.o. female with history of a mitral valve replacement on warfarin, hypertension, hyperlipidemia, NSTEMI CHF, bronchitis and COPD presenting with a cough and shortness of breath for the last week.  On arrival patient is hypertensive at 219/79, afebrile saturating appropriately on room air no acute distress.  On exam patient has mild lower extremity edema, wheezing in the left lung fields compared to the right.  She does not appear to be in respiratory distress.  She also has known sick contact in the last week.  Given the chest tightness and multiple risk factors ordered an EKG, troponin along with a chest x-ray to look for signs of pneumonia.  Overall sounds like a viral illness so obtained COVID flu and RSV swabs.  Patient given a dose of Tessalon Perle, DuoNeb and amlodipine for her hypertension as this is a home med she has not had today.  Less concern for PE as patient is therapeutically anticoagulated on warfarin with an INR of 2.8.  Remainder of care and discussion on lab work is discussed in the ED course.  ----      Differential diagnoses considered include but are not limited to: URI, CHF exacerbation, atypical ACS     Social Determinants of Health which Significantly Impact Care: None identified     EKG Independent Interpretation: EKG interpreted by myself. Please see ED Course for full interpretation.    Independent Result Review and Interpretation: Relevant laboratory and radiographic results were reviewed and independently interpreted by myself.  As necessary, they are commented on in the ED Course.    Chronic conditions affecting the patient's care: As documented above in [facility]    The patient was discussed with the following consultants/services: None    Care Considerations: As documented above in [facility]    ED Course:  ED Course as of 24 1757   Fri Dec 13, 2024   1351 ECG 12 Lead  Shows atrial fibrillation with average rate of 86 bpm.  Normal QTc.  No ST elevations or depressions  concerning for ischemia.  T wave inversions noted in lead III and aVF which are unchanged compared to EKG May 2 [AW]   1438 XR chest 2 views  Slight prominence of the perihilar and basilar interstitium with some thickening of the fissures suggesting a mild component of edema.   [AW]   1447 INR(!): 2.8 [AW]   1506 Comprehensive metabolic panel(!)  Hyperglycemia although no other electrolyte abnormalities  [AW]   1518 Troponin I, High Sensitivity(!): 15 [AW]   1518 BNP(!): 178  Slight increase compared to prior [AW]   1554 RSV PCR: Not Detected [AW]   1554 Coronavirus 2019, PCR: Not Detected [AW]   1554 Flu A Result: Not Detected [AW]   1554 Flu B Result: Not Detected [AW]   1746 Troponin I, High Sensitivity(!): 14 [AW]   1756 Patient's vitals improved, still saturating appropriately on room air.  On reevaluation lungs sound clear.  Delta troponin stable at 14.  Overall constellation of symptoms are concerning for COPD exacerbation.  Discussed sending home with a course of prednisone.  Recommended following up with primary care or with the emergency department if symptoms are to worsen, otherwise no indication for antibiotics as no evidence of pneumonia on chest x-ray.  Will recommend to her to continue taking her nebulized albuterol at home. [AW]      ED Course User Index  [AW] Susan Peterson DO         Diagnoses as of 12/13/24 1757   COPD exacerbation (Multi)     Disposition   As a result of the work-up, the patient was discharged home.  she was informed of her diagnosis and instructed to come back with any concerns or worsening of condition.  she and was agreeable to the plan as discussed above.  she was given the opportunity to ask questions.  All of the patient's questions were answered.    Procedures   Procedures    Patient seen and discussed with ED attending physician.    Susan Peterson DO  Emergency Medicine       Susan Peterson DO  Resident  12/13/24 1757

## 2024-12-14 DIAGNOSIS — Z95.2 HISTORY OF PROSTHETIC HEART VALVE: ICD-10-CM

## 2024-12-16 RX ORDER — WARFARIN SODIUM 5 MG/1
TABLET ORAL
Qty: 55 TABLET | Refills: 1 | Status: SHIPPED | OUTPATIENT
Start: 2024-12-16

## 2024-12-17 PROCEDURE — 93005 ELECTROCARDIOGRAM TRACING: CPT

## 2024-12-19 LAB
Q ONSET: 225 MS
QRS COUNT: 14 BEATS
QRS DURATION: 102 MS
QT INTERVAL: 378 MS
QTC CALCULATION(BAZETT): 452 MS
QTC FREDERICIA: 426 MS
R AXIS: 104 DEGREES
T AXIS: 14 DEGREES
T OFFSET: 414 MS
VENTRICULAR RATE: 86 BPM

## 2024-12-31 ENCOUNTER — APPOINTMENT (OUTPATIENT)
Dept: CARDIOLOGY | Facility: HOSPITAL | Age: 74
End: 2024-12-31
Payer: MEDICARE

## 2024-12-31 ENCOUNTER — HOSPITAL ENCOUNTER (INPATIENT)
Facility: HOSPITAL | Age: 74
End: 2024-12-31
Attending: EMERGENCY MEDICINE
Payer: MEDICARE

## 2024-12-31 ENCOUNTER — APPOINTMENT (OUTPATIENT)
Dept: RADIOLOGY | Facility: HOSPITAL | Age: 74
End: 2024-12-31
Payer: MEDICARE

## 2024-12-31 DIAGNOSIS — J44.1 COPD EXACERBATION (MULTI): ICD-10-CM

## 2024-12-31 DIAGNOSIS — Z01.89 ENCOUNTER FOR OTHER SPECIFIED SPECIAL EXAMINATIONS: ICD-10-CM

## 2024-12-31 DIAGNOSIS — I50.30 UNSPECIFIED DIASTOLIC (CONGESTIVE) HEART FAILURE: ICD-10-CM

## 2024-12-31 DIAGNOSIS — I50.20 UNSPECIFIED SYSTOLIC (CONGESTIVE) HEART FAILURE: ICD-10-CM

## 2024-12-31 DIAGNOSIS — Z95.2 MECHANICAL HEART VALVE PRESENT: ICD-10-CM

## 2024-12-31 DIAGNOSIS — E83.42 HYPOMAGNESEMIA: ICD-10-CM

## 2024-12-31 DIAGNOSIS — I50.33 ACUTE ON CHRONIC DIASTOLIC (CONGESTIVE) HEART FAILURE: ICD-10-CM

## 2024-12-31 DIAGNOSIS — I48.91 ATRIAL FIBRILLATION WITH RVR (MULTI): ICD-10-CM

## 2024-12-31 DIAGNOSIS — J10.1 INFLUENZA A: Primary | ICD-10-CM

## 2024-12-31 LAB
ALBUMIN SERPL BCP-MCNC: 4.2 G/DL (ref 3.4–5)
ALP SERPL-CCNC: 80 U/L (ref 33–136)
ALT SERPL W P-5'-P-CCNC: 15 U/L (ref 7–45)
ANION GAP SERPL CALC-SCNC: 13 MMOL/L (ref 10–20)
APPEARANCE UR: ABNORMAL
AST SERPL W P-5'-P-CCNC: 24 U/L (ref 9–39)
BASOPHILS # BLD AUTO: 0.06 X10*3/UL (ref 0–0.1)
BASOPHILS NFR BLD AUTO: 0.6 %
BILIRUB SERPL-MCNC: 2.6 MG/DL (ref 0–1.2)
BILIRUB UR STRIP.AUTO-MCNC: NEGATIVE MG/DL
BNP SERPL-MCNC: 230 PG/ML (ref 0–99)
BUN SERPL-MCNC: 14 MG/DL (ref 6–23)
CALCIUM SERPL-MCNC: 9.2 MG/DL (ref 8.6–10.3)
CARDIAC TROPONIN I PNL SERPL HS: 23 NG/L (ref 0–13)
CARDIAC TROPONIN I PNL SERPL HS: 27 NG/L (ref 0–13)
CHLORIDE SERPL-SCNC: 102 MMOL/L (ref 98–107)
CO2 SERPL-SCNC: 25 MMOL/L (ref 21–32)
COLOR UR: YELLOW
CREAT SERPL-MCNC: 0.88 MG/DL (ref 0.5–1.05)
D DIMER PPP FEU-MCNC: 417 NG/ML FEU
EGFRCR SERPLBLD CKD-EPI 2021: 70 ML/MIN/1.73M*2
EOSINOPHIL # BLD AUTO: 0.01 X10*3/UL (ref 0–0.4)
EOSINOPHIL NFR BLD AUTO: 0.1 %
ERYTHROCYTE [DISTWIDTH] IN BLOOD BY AUTOMATED COUNT: 15.8 % (ref 11.5–14.5)
FLUAV RNA RESP QL NAA+PROBE: DETECTED
FLUBV RNA RESP QL NAA+PROBE: NOT DETECTED
GLUCOSE SERPL-MCNC: 258 MG/DL (ref 74–99)
GLUCOSE UR STRIP.AUTO-MCNC: ABNORMAL MG/DL
HCT VFR BLD AUTO: 39.2 % (ref 36–46)
HGB BLD-MCNC: 12.6 G/DL (ref 12–16)
IMM GRANULOCYTES # BLD AUTO: 0.06 X10*3/UL (ref 0–0.5)
IMM GRANULOCYTES NFR BLD AUTO: 0.6 % (ref 0–0.9)
INR PPP: 2.3 (ref 0.9–1.1)
KETONES UR STRIP.AUTO-MCNC: NEGATIVE MG/DL
LACTATE SERPL-SCNC: 2.2 MMOL/L (ref 0.4–2)
LACTATE SERPL-SCNC: 3 MMOL/L (ref 0.4–2)
LEUKOCYTE ESTERASE UR QL STRIP.AUTO: NEGATIVE
LYMPHOCYTES # BLD AUTO: 0.48 X10*3/UL (ref 0.8–3)
LYMPHOCYTES NFR BLD AUTO: 5.1 %
MAGNESIUM SERPL-MCNC: 1.44 MG/DL (ref 1.6–2.4)
MCH RBC QN AUTO: 28 PG (ref 26–34)
MCHC RBC AUTO-ENTMCNC: 32.1 G/DL (ref 32–36)
MCV RBC AUTO: 87 FL (ref 80–100)
MONOCYTES # BLD AUTO: 0.81 X10*3/UL (ref 0.05–0.8)
MONOCYTES NFR BLD AUTO: 8.5 %
MUCOUS THREADS #/AREA URNS AUTO: NORMAL /LPF
NEUTROPHILS # BLD AUTO: 8.08 X10*3/UL (ref 1.6–5.5)
NEUTROPHILS NFR BLD AUTO: 85.1 %
NITRITE UR QL STRIP.AUTO: NEGATIVE
NRBC BLD-RTO: 0 /100 WBCS (ref 0–0)
PH UR STRIP.AUTO: 6 [PH]
PLATELET # BLD AUTO: 224 X10*3/UL (ref 150–450)
POTASSIUM SERPL-SCNC: 4.1 MMOL/L (ref 3.5–5.3)
PROT SERPL-MCNC: 7.1 G/DL (ref 6.4–8.2)
PROT UR STRIP.AUTO-MCNC: ABNORMAL MG/DL
PROTHROMBIN TIME: 25.9 SECONDS (ref 9.8–12.8)
RBC # BLD AUTO: 4.5 X10*6/UL (ref 4–5.2)
RBC # UR STRIP.AUTO: ABNORMAL /UL
RBC #/AREA URNS AUTO: NORMAL /HPF
RSV RNA RESP QL NAA+PROBE: NOT DETECTED
SARS-COV-2 RNA RESP QL NAA+PROBE: NOT DETECTED
SODIUM SERPL-SCNC: 136 MMOL/L (ref 136–145)
SP GR UR STRIP.AUTO: 1.03
SQUAMOUS #/AREA URNS AUTO: NORMAL /HPF
UROBILINOGEN UR STRIP.AUTO-MCNC: NORMAL MG/DL
WBC # BLD AUTO: 9.5 X10*3/UL (ref 4.4–11.3)
WBC #/AREA URNS AUTO: NORMAL /HPF

## 2024-12-31 PROCEDURE — 2500000002 HC RX 250 W HCPCS SELF ADMINISTERED DRUGS (ALT 637 FOR MEDICARE OP, ALT 636 FOR OP/ED)

## 2024-12-31 PROCEDURE — 2500000002 HC RX 250 W HCPCS SELF ADMINISTERED DRUGS (ALT 637 FOR MEDICARE OP, ALT 636 FOR OP/ED): Performed by: EMERGENCY MEDICINE

## 2024-12-31 PROCEDURE — 83735 ASSAY OF MAGNESIUM: CPT | Performed by: NURSE PRACTITIONER

## 2024-12-31 PROCEDURE — 94640 AIRWAY INHALATION TREATMENT: CPT

## 2024-12-31 PROCEDURE — 2500000004 HC RX 250 GENERAL PHARMACY W/ HCPCS (ALT 636 FOR OP/ED): Performed by: PHYSICIAN ASSISTANT

## 2024-12-31 PROCEDURE — 85379 FIBRIN DEGRADATION QUANT: CPT | Performed by: NURSE PRACTITIONER

## 2024-12-31 PROCEDURE — 2500000002 HC RX 250 W HCPCS SELF ADMINISTERED DRUGS (ALT 637 FOR MEDICARE OP, ALT 636 FOR OP/ED): Performed by: PHYSICIAN ASSISTANT

## 2024-12-31 PROCEDURE — 2500000005 HC RX 250 GENERAL PHARMACY W/O HCPCS: Performed by: EMERGENCY MEDICINE

## 2024-12-31 PROCEDURE — 87637 SARSCOV2&INF A&B&RSV AMP PRB: CPT | Performed by: NURSE PRACTITIONER

## 2024-12-31 PROCEDURE — 83880 ASSAY OF NATRIURETIC PEPTIDE: CPT | Performed by: NURSE PRACTITIONER

## 2024-12-31 PROCEDURE — 85025 COMPLETE CBC W/AUTO DIFF WBC: CPT | Performed by: NURSE PRACTITIONER

## 2024-12-31 PROCEDURE — 96375 TX/PRO/DX INJ NEW DRUG ADDON: CPT

## 2024-12-31 PROCEDURE — 80053 COMPREHEN METABOLIC PANEL: CPT | Performed by: NURSE PRACTITIONER

## 2024-12-31 PROCEDURE — 71046 X-RAY EXAM CHEST 2 VIEWS: CPT | Performed by: RADIOLOGY

## 2024-12-31 PROCEDURE — 84484 ASSAY OF TROPONIN QUANT: CPT | Performed by: NURSE PRACTITIONER

## 2024-12-31 PROCEDURE — 36415 COLL VENOUS BLD VENIPUNCTURE: CPT | Performed by: NURSE PRACTITIONER

## 2024-12-31 PROCEDURE — 96374 THER/PROPH/DIAG INJ IV PUSH: CPT

## 2024-12-31 PROCEDURE — 99285 EMERGENCY DEPT VISIT HI MDM: CPT | Mod: 25 | Performed by: EMERGENCY MEDICINE

## 2024-12-31 PROCEDURE — 85610 PROTHROMBIN TIME: CPT | Performed by: NURSE PRACTITIONER

## 2024-12-31 PROCEDURE — 81001 URINALYSIS AUTO W/SCOPE: CPT | Performed by: NURSE PRACTITIONER

## 2024-12-31 PROCEDURE — 83605 ASSAY OF LACTIC ACID: CPT | Performed by: NURSE PRACTITIONER

## 2024-12-31 PROCEDURE — 2500000004 HC RX 250 GENERAL PHARMACY W/ HCPCS (ALT 636 FOR OP/ED): Performed by: EMERGENCY MEDICINE

## 2024-12-31 PROCEDURE — 1200000002 HC GENERAL ROOM WITH TELEMETRY DAILY

## 2024-12-31 PROCEDURE — 84484 ASSAY OF TROPONIN QUANT: CPT | Performed by: EMERGENCY MEDICINE

## 2024-12-31 PROCEDURE — 2500000001 HC RX 250 WO HCPCS SELF ADMINISTERED DRUGS (ALT 637 FOR MEDICARE OP): Performed by: EMERGENCY MEDICINE

## 2024-12-31 PROCEDURE — 99223 1ST HOSP IP/OBS HIGH 75: CPT

## 2024-12-31 PROCEDURE — 96361 HYDRATE IV INFUSION ADD-ON: CPT

## 2024-12-31 PROCEDURE — 71046 X-RAY EXAM CHEST 2 VIEWS: CPT

## 2024-12-31 PROCEDURE — 93005 ELECTROCARDIOGRAM TRACING: CPT

## 2024-12-31 PROCEDURE — 2500000004 HC RX 250 GENERAL PHARMACY W/ HCPCS (ALT 636 FOR OP/ED)

## 2024-12-31 PROCEDURE — 87040 BLOOD CULTURE FOR BACTERIA: CPT | Mod: PARLAB | Performed by: NURSE PRACTITIONER

## 2024-12-31 RX ORDER — ASPIRIN 81 MG/1
81 TABLET ORAL DAILY
Status: DISCONTINUED | OUTPATIENT
Start: 2024-12-31 | End: 2025-01-08 | Stop reason: HOSPADM

## 2024-12-31 RX ORDER — AMLODIPINE BESYLATE 5 MG/1
5 TABLET ORAL DAILY
Status: DISCONTINUED | OUTPATIENT
Start: 2024-12-31 | End: 2025-01-08 | Stop reason: HOSPADM

## 2024-12-31 RX ORDER — SODIUM CHLORIDE, SODIUM LACTATE, POTASSIUM CHLORIDE, CALCIUM CHLORIDE 600; 310; 30; 20 MG/100ML; MG/100ML; MG/100ML; MG/100ML
75 INJECTION, SOLUTION INTRAVENOUS CONTINUOUS
Status: ACTIVE | OUTPATIENT
Start: 2024-12-31 | End: 2025-01-01

## 2024-12-31 RX ORDER — IPRATROPIUM BROMIDE AND ALBUTEROL SULFATE 2.5; .5 MG/3ML; MG/3ML
3 SOLUTION RESPIRATORY (INHALATION)
Status: DISCONTINUED | OUTPATIENT
Start: 2025-01-01 | End: 2025-01-08 | Stop reason: HOSPADM

## 2024-12-31 RX ORDER — IPRATROPIUM BROMIDE AND ALBUTEROL SULFATE 2.5; .5 MG/3ML; MG/3ML
3 SOLUTION RESPIRATORY (INHALATION) ONCE
Status: COMPLETED | OUTPATIENT
Start: 2024-12-31 | End: 2024-12-31

## 2024-12-31 RX ORDER — FUROSEMIDE 40 MG/1
40 TABLET ORAL DAILY PRN
Status: DISCONTINUED | OUTPATIENT
Start: 2024-12-31 | End: 2025-01-01

## 2024-12-31 RX ORDER — ONDANSETRON HYDROCHLORIDE 2 MG/ML
4 INJECTION, SOLUTION INTRAVENOUS EVERY 8 HOURS PRN
Status: DISCONTINUED | OUTPATIENT
Start: 2024-12-31 | End: 2025-01-08 | Stop reason: HOSPADM

## 2024-12-31 RX ORDER — ALBUTEROL SULFATE 0.83 MG/ML
2.5 SOLUTION RESPIRATORY (INHALATION) EVERY 2 HOUR PRN
Status: DISCONTINUED | OUTPATIENT
Start: 2024-12-31 | End: 2025-01-08 | Stop reason: HOSPADM

## 2024-12-31 RX ORDER — ACETAMINOPHEN 325 MG/1
650 TABLET ORAL EVERY 4 HOURS PRN
Status: DISCONTINUED | OUTPATIENT
Start: 2024-12-31 | End: 2025-01-08 | Stop reason: HOSPADM

## 2024-12-31 RX ORDER — WARFARIN SODIUM 5 MG/1
5 TABLET ORAL
Status: DISCONTINUED | OUTPATIENT
Start: 2025-01-06 | End: 2025-01-08 | Stop reason: HOSPADM

## 2024-12-31 RX ORDER — LANOLIN ALCOHOL/MO/W.PET/CERES
400 CREAM (GRAM) TOPICAL ONCE
Status: COMPLETED | OUTPATIENT
Start: 2024-12-31 | End: 2024-12-31

## 2024-12-31 RX ORDER — ONDANSETRON 4 MG/1
4 TABLET, FILM COATED ORAL EVERY 8 HOURS PRN
Status: DISCONTINUED | OUTPATIENT
Start: 2024-12-31 | End: 2025-01-08 | Stop reason: HOSPADM

## 2024-12-31 RX ORDER — ACETAMINOPHEN 325 MG/1
650 TABLET ORAL ONCE
Status: COMPLETED | OUTPATIENT
Start: 2024-12-31 | End: 2024-12-31

## 2024-12-31 RX ORDER — LEVOTHYROXINE SODIUM 75 UG/1
75 TABLET ORAL
Status: DISCONTINUED | OUTPATIENT
Start: 2025-01-01 | End: 2025-01-08 | Stop reason: HOSPADM

## 2024-12-31 RX ORDER — ONDANSETRON HYDROCHLORIDE 2 MG/ML
4 INJECTION, SOLUTION INTRAVENOUS ONCE
Status: COMPLETED | OUTPATIENT
Start: 2024-12-31 | End: 2024-12-31

## 2024-12-31 RX ORDER — ACETAMINOPHEN 650 MG/1
650 SUPPOSITORY RECTAL EVERY 4 HOURS PRN
Status: DISCONTINUED | OUTPATIENT
Start: 2024-12-31 | End: 2025-01-08 | Stop reason: HOSPADM

## 2024-12-31 RX ORDER — DEXTROSE 50 % IN WATER (D50W) INTRAVENOUS SYRINGE
25
Status: DISCONTINUED | OUTPATIENT
Start: 2024-12-31 | End: 2025-01-06 | Stop reason: SDUPTHER

## 2024-12-31 RX ORDER — WARFARIN 2.5 MG/1
2.5 TABLET ORAL
Status: DISCONTINUED | OUTPATIENT
Start: 2025-01-01 | End: 2025-01-08 | Stop reason: HOSPADM

## 2024-12-31 RX ORDER — ACETAMINOPHEN 160 MG/5ML
650 SOLUTION ORAL EVERY 4 HOURS PRN
Status: DISCONTINUED | OUTPATIENT
Start: 2024-12-31 | End: 2025-01-08 | Stop reason: HOSPADM

## 2024-12-31 RX ORDER — IPRATROPIUM BROMIDE AND ALBUTEROL SULFATE 2.5; .5 MG/3ML; MG/3ML
3 SOLUTION RESPIRATORY (INHALATION) EVERY 20 MIN
Status: COMPLETED | OUTPATIENT
Start: 2024-12-31 | End: 2024-12-31

## 2024-12-31 RX ORDER — INSULIN LISPRO 100 [IU]/ML
0-10 INJECTION, SOLUTION INTRAVENOUS; SUBCUTANEOUS
Status: DISCONTINUED | OUTPATIENT
Start: 2025-01-01 | End: 2025-01-08 | Stop reason: HOSPADM

## 2024-12-31 RX ORDER — IPRATROPIUM BROMIDE AND ALBUTEROL SULFATE 2.5; .5 MG/3ML; MG/3ML
3 SOLUTION RESPIRATORY (INHALATION)
Status: DISCONTINUED | OUTPATIENT
Start: 2024-12-31 | End: 2024-12-31

## 2024-12-31 RX ORDER — DEXTROSE 50 % IN WATER (D50W) INTRAVENOUS SYRINGE
12.5
Status: DISCONTINUED | OUTPATIENT
Start: 2024-12-31 | End: 2025-01-06 | Stop reason: SDUPTHER

## 2024-12-31 RX ADMIN — IPRATROPIUM BROMIDE AND ALBUTEROL SULFATE 3 ML: .5; 3 SOLUTION RESPIRATORY (INHALATION) at 14:14

## 2024-12-31 RX ADMIN — Medication 2 L/MIN: at 20:00

## 2024-12-31 RX ADMIN — SODIUM CHLORIDE, POTASSIUM CHLORIDE, SODIUM LACTATE AND CALCIUM CHLORIDE 75 ML/HR: 600; 310; 30; 20 INJECTION, SOLUTION INTRAVENOUS at 22:50

## 2024-12-31 RX ADMIN — METHYLPREDNISOLONE SODIUM SUCCINATE 40 MG: 40 INJECTION, POWDER, FOR SOLUTION INTRAMUSCULAR; INTRAVENOUS at 22:51

## 2024-12-31 RX ADMIN — MAGNESIUM OXIDE TAB 400 MG (241.3 MG ELEMENTAL MG) 400 MG: 400 (241.3 MG) TAB at 13:29

## 2024-12-31 RX ADMIN — ONDANSETRON 4 MG: 2 INJECTION INTRAMUSCULAR; INTRAVENOUS at 13:28

## 2024-12-31 RX ADMIN — Medication 4 L/MIN: at 14:14

## 2024-12-31 RX ADMIN — SODIUM CHLORIDE, POTASSIUM CHLORIDE, SODIUM LACTATE AND CALCIUM CHLORIDE 1000 ML: 600; 310; 30; 20 INJECTION, SOLUTION INTRAVENOUS at 13:29

## 2024-12-31 RX ADMIN — Medication 2 L/MIN: at 19:48

## 2024-12-31 RX ADMIN — IPRATROPIUM BROMIDE AND ALBUTEROL SULFATE 3 ML: .5; 3 SOLUTION RESPIRATORY (INHALATION) at 16:15

## 2024-12-31 RX ADMIN — ALBUTEROL SULFATE 2.5 MG: 2.5 SOLUTION RESPIRATORY (INHALATION) at 22:08

## 2024-12-31 RX ADMIN — IPRATROPIUM BROMIDE AND ALBUTEROL SULFATE 3 ML: .5; 3 SOLUTION RESPIRATORY (INHALATION) at 16:20

## 2024-12-31 RX ADMIN — METHYLPREDNISOLONE SODIUM SUCCINATE 125 MG: 125 INJECTION, POWDER, FOR SOLUTION INTRAMUSCULAR; INTRAVENOUS at 13:28

## 2024-12-31 RX ADMIN — ACETAMINOPHEN 650 MG: 325 TABLET, FILM COATED ORAL at 13:28

## 2024-12-31 ASSESSMENT — COLUMBIA-SUICIDE SEVERITY RATING SCALE - C-SSRS
2. HAVE YOU ACTUALLY HAD ANY THOUGHTS OF KILLING YOURSELF?: NO
1. IN THE PAST MONTH, HAVE YOU WISHED YOU WERE DEAD OR WISHED YOU COULD GO TO SLEEP AND NOT WAKE UP?: NO
6. HAVE YOU EVER DONE ANYTHING, STARTED TO DO ANYTHING, OR PREPARED TO DO ANYTHING TO END YOUR LIFE?: NO

## 2024-12-31 ASSESSMENT — PAIN - FUNCTIONAL ASSESSMENT: PAIN_FUNCTIONAL_ASSESSMENT: 0-10

## 2024-12-31 ASSESSMENT — PAIN SCALES - GENERAL: PAINLEVEL_OUTOF10: 0 - NO PAIN

## 2024-12-31 NOTE — PROGRESS NOTES
Pharmacy Medication History Review    Genet Wilks is a 74 y.o. female admitted for No Principal Problem: There is no principal problem currently on the Problem List. Please update the Problem List and refresh.. Pharmacy reviewed the patient's [id number] medications and allergies for accuracy.    The list below reflectives the updated PTA list. Please review each medication in order reconciliation for additional clarification and justification.  Prior to Admission medications    Medication Sig Start Date End Date Taking? Authorizing Provider   amLODIPine (Norvasc) 5 mg tablet Take 1 tablet by mouth once daily 9/18/24  Yes Mitesh Simms DO   aspirin 81 mg EC tablet Take by mouth. 8/21/19  Yes Historical Provider, MD   atorvastatin (Lipitor) 20 mg tablet TAKE 1 TABLET BY MOUTH AT BEDTIME 5/24/24  Yes Lul Day MD   cholecalciferol (Vitamin D-3) 50 MCG (2000 UT) tablet Take 1 tablet (2,000 Units) by mouth once daily.   Yes Historical Provider, MD   glimepiride (Amaryl) 2 mg tablet TAKE 1 TABLET BY MOUTH ONCE DAILY AS DIRECTED  Patient taking differently: Take 1 tablet (2 mg) by mouth once daily in the morning. Take before meals. 8/5/24  Yes Mitesh Simms DO   levothyroxine (Synthroid, Levoxyl) 75 mcg tablet TAKE 1 TABLET BY MOUTH ONCE DAILY AS DIRECTED  Patient taking differently: Take 1 tablet (75 mcg) by mouth early in the morning.. 8/5/24  Yes Mitesh Simms, DO   oxygen (O2) gas therapy Inhale continuously.   Yes Historical Provider, MD   warfarin (Coumadin) 5 mg tablet Take 5 mg (1 tablet) every Monday and 2.5 mg (1/2 tablet) all other days or as directed by the anticoagulation clinic. 12/16/24  Yes Lul Day MD   albuterol 0.63 mg/3 mL nebulizer solution Take 3 mL (0.63 mg) by nebulization 3 times a day.  Patient taking differently: Take 3 mL (0.63 mg) by nebulization every 8 hours if needed. 1/2/24  no Mitesh Simms DO   albuterol 90 mcg/actuation inhaler Inhale 2  puffs every 6 hours if needed for wheezing or shortness of breath. 9/3/19  no Historical Provider, MD   benzonatate (Tessalon) 100 mg capsule Take by mouth.  Patient not taking: Reported on 12/31/2024   no Historical Provider, MD   blood sugar diagnostic (OneTouch Ultra Test) strip USE 1 STRIP TO CHECK GLUCOSE ONCE DAILY 8/9/24  yes Mitesh Simms DO   furosemide (Lasix) 40 mg tablet Take 1 tablet (40 mg) by mouth once daily as needed (swelling).   yes Historical Provider, MD   gabapentin (Neurontin) 100 mg capsule Take by mouth.  Patient not taking: Reported on 12/31/2024 4/7/20  no Historical Provider, MD   lancets (OneTouch Delica Plus Lancet) 33 gauge misc USE 1 TO CHECK GLUCOSE ONCE DAILY 2/13/24  yes Mitesh Simms DO   metoprolol succinate XL (Toprol-XL) 25 mg 24 hr tablet Take 1 tablet (25 mg) by mouth once daily.  Patient not taking: Reported on 12/31/2024 12/10/19  no Historical Provider, MD        The list below reflectives the updated allergy list. Please review each documented allergy for additional clarification and justification.  Allergies  Reviewed by Laurie Blancas RN on 12/31/2024        Severity Reactions Comments    Penicillins Medium Hives, Itching     Prednisone Medium Itching     Mushroom Low Hives, Itching     Simvastatin Low Rash             Below are additional concerns with the patient's PTA list.      Flor Bertrand

## 2024-12-31 NOTE — ED PROVIDER NOTES
HPI   Chief Complaint   Patient presents with    Shortness of Breath       HPIThekostas Wilks is a 74 y.o. female history of mitral valve replacement on warfarin, hypertension, hyperlipidemia, hypothyroidism, NSTEMI, CHF, mucopurulent bronchitis, COPD presenting with a chief complaint of cough and shortness of breath since Sunday.  Thank so much at night even have bouts of posttussis emesis.  If any fever did feel warm the other day.  No medications taken prior to arrival.  Patient states that she is on a diuretic, has not been gaining weight or increasing lower extremity edema.  Remained about the same she states.  She uses oxygen 2 L only at night.  Denying any abdominal pain bowel or bladder changes.  Primary care doctor is Dr. Fonseca        Patient History   Past Medical History:   Diagnosis Date    Long term (current) use of anticoagulants     Long-term (current) use of anticoagulants    Old myocardial infarction     History of non-ST elevation myocardial infarction (NSTEMI)    Personal history of (corrected) congenital malformations of heart and circulatory system     History of congenital anomaly of heart    Personal history of other diseases of the circulatory system     History of rheumatic fever    Personal history of other diseases of the circulatory system     History of mitral valve prolapse    Personal history of other diseases of the circulatory system     History of atrial fibrillation    Personal history of other diseases of the musculoskeletal system and connective tissue     History of back pain    Personal history of other diseases of the nervous system and sense organs     History of carpal tunnel syndrome    Personal history of other diseases of the respiratory system     History of asthma    Personal history of other endocrine, nutritional and metabolic disease     History of morbid obesity    Personal history of other endocrine, nutritional and metabolic disease     History of  hypothyroidism    Personal history of other endocrine, nutritional and metabolic disease     History of hypercholesterolemia    Personal history of other specified conditions     History of chest pain    Presence of other heart-valve replacement     Heart valve replaced by other means    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    Mechanical heart valve present     Past Surgical History:   Procedure Laterality Date    CARDIAC CATHETERIZATION  06/15/2018    Cardiac Cath Procedure Outcome:    CARDIAC SURGERY  11/13/2014    Heart Surgery    KNEE SURGERY  06/15/2018    Knee Surgery    OTHER SURGICAL HISTORY  11/13/2014    Surgery    OTHER SURGICAL HISTORY  11/13/2014    Colposcopy Cervix With Biopsy(S)    TONSILLECTOMY  11/13/2014    Tonsillectomy    TUBAL LIGATION  11/13/2014    Tubal Ligation     Family History   Problem Relation Name Age of Onset    Diabetes Mother      Stroke Father      Hypertension Father      Diabetes Sister      Breast cancer Sister      Throat cancer Brother      Diabetes Brother      Other (S/P CABG x3) Brother      Cancer Other sibling      Social History     Tobacco Use    Smoking status: Never     Passive exposure: Never    Smokeless tobacco: Never   Vaping Use    Vaping status: Never Used   Substance Use Topics    Alcohol use: Not Currently    Drug use: Never       Physical Exam   ED Triage Vitals [12/31/24 1120]   Temperature Heart Rate Respirations BP   (!) 38.7 °C (101.7 °F) (!) 118 (!) 24 (!) 183/79      Pulse Ox Temp src Heart Rate Source Patient Position   (!) 92 % -- -- --      BP Location FiO2 (%)     -- --       Physical Exam    Reviewed family history social history and allergies and were noncontributory to current problem.    Review of systems as noted in history of present illness  otherwise negative. All other systems were reviewed and negative.     PMHX: Chronic conditions: reviewd in EMR, relevant history noted in HPI                Surgeries, hospitalizations: reviewed  in EMR , relevant history noted in HPI                Medications: reviewed in EMR, relevant history noted in HPI                Allergies: reviewed in EMR, relevant history noted in HPI      PHYSICAL EXAM:    GENERAL/ CONSTITUTIONAL: Vitals noted, no distress. Alert and oriented  x 3. Non-toxic.  No Drooling or stridor .  Noted tachycardic is a fever respirations somewhat elevated at 24    HEAD: Normocephalic Atraumatic    EENT: TMs clear. Posterior oropharynx unremarkable. No meningismus. No LAD.  No exudate present.      EYES: PERRLA EOMI     NECK: Supple. Nontender. No midline tenderness.  Full range of motion    CARDIAC: Regular irregular rhythm. No murmurs rubs or gallops. No JVD    PULMONARY: Lungs with scattered wheezes . NO rales or rhonchi. No respiratory distress.     GI: Soft, . Nontender. No peritoneal signs. Normoactive bowel sounds. No pulsatile masses.  No guarding or rebound    EXTREMITIES/MUSCULOSKELTAL: Minmal Bi peripheral edema. Negative Homans bilaterally, NVIT, dorsal pedis pulses +2 /4 equal. FROM in all extremities and equal.     SKIN: No rash. Intact.     NEURO: No focal neurologic deficits,     PSYCH: appropriate mood and affect    MEDICAL DECISION MAKING:      ED Course & MDM     Labs were ordered in triage.  Patient is in fact positive for flu and she is hypomagnesimic.  I will replete the magnesium.  She is out of the window for Tamiflu at this point plus she is having some posttussis emesis which would most likely just increase.  Troponin is minimally elevated as well.  Lactate is 3.0 I will rehydrate as well.    Patient is feeling better and is a matter fact has an appetite and would like some chicken soup which we obliged.  Has been on 4 L of oxygen but she took it off to talk and felt okay however when she is walking she does seem to go down a bit and still does not feel safe going home.  We will give her a few more breathing treatments here and reassess.  Vitals have improved as  well  ED Course as of 12/31/24 1621   Tue Dec 31, 2024   1244 Flu A Result(!): Detected [CV]   1244 MAGNESIUM(!): 1.44 [CV]   1244 Troponin I, High Sensitivity(!): 23 [CV]   1244 Lactate(!): 3.0 [CV]   1245 No active cardiopulmonary disease.  There has not been  significant interval change from the prior exam   [CV]   1252 Chest x-ray interpreted me with possible left lower lobe pneumonia, radiology stable compared to prior [EK]   1254 EKG interpreted by me A-fib with RVR rate 110 ?Rward axis otherwise normal intervals QTc 435 no ischemic changes [EK]   1310 Pt seen with MARIANO - this is a 74yF HTN HLD DM CHF COPD on 2L home O2 who presents with SOB and cough.  Pt febrile and tachycardic on ED arrival with mild tachypnea and hypoxia requiring 2-4L NC (slight increased from her baseline O2).  She is wheezing diffusely on exam without sig accessory muscle use.  Workup shows flu A+ with hypomagnesemia, mildly elevated troponin, no pneumonia on CXR.  Will treat with tylenol, IV LR, duonebs, steroids, Mg repletion and reassess need for admission. [EK]      ED Course User Index  [CV] Shabnam Gonzalez PA-C  [EK] Elyse H Klerman, MD         Diagnoses as of 12/31/24 1621   Influenza A   COPD exacerbation (Multi)   Hypomagnesemia   Atrial fibrillation with RVR (Multi)                 No data recorded     Annie Coma Scale Score: 15 (12/31/24 1122 : Laurie Blancas RN)                           Medical Decision Making  Signout to Dr. Klerman  Final disposition.  Her primary care doctor is Dr. Hansen  Procedure  Procedures     Shabnam Gonzalez PA-C  12/31/24 1621

## 2024-12-31 NOTE — ED TRIAGE NOTES
TRIAGE NOTE   I saw the patient as the Clinician in Triage and performed a brief history and physical exam, established acuity, and ordered appropriate tests to develop basic plan of care. Patient will be seen by an MARIANO, resident and/or physician who will independently evaluate the patient. Please see subsequent provider notes for further details and disposition.     Brief HPI: In brief, Genet Wilks is a 74 y.o. female past medical history for mitral valve replacement, HTN, HLD, DM and CHF on 2 L NC prn Q HS presenting to ED today from home by herself for evaluation of cough and shortness of breath.  For last 3 days the patient has had a dry cough, shortness of breath, chills, fever and nasal congestion.  No sick contacts.  No weight gain, denies orthopnea, mild lower extremity edema.  No history of PE/DVT, recent travel, recent surgery, history of malignancy or use of exogenous hormones.  Denies chest pain, nausea/vomiting, abdominal pain, urinary symptoms, change in bowel habits or any other complaints.  No smoking, EtOH or IV drugs.  PCP is Dr. Simms.     Focused Physical exam:   General: 74-year-old  female, awake and alert, oriented x 3, mildly short of breath.  Appears tired and fatigued but nontoxic.   Skin: Pink, warm and dry.  Cardiac: Tachycardic at a rate of 118, regular rhythm.  Pulmonary: Wheezes on inspiration expiration, no rales or rhonchi.  Mild accessory muscle use.  No stridor.  Abdomen: Obese and soft, nontender.  No CVA tenderness      Plan/MDM:    74 y.o. female past medical history for mitral valve replacement, HTN, HLD, DM and CHF on 2 L NC prn Q HS is evaluated at the bedside for 3 days of dry cough, nasal congestion, fever, chills and increasing shortness of breath.  On arrival to the ED, blood pressure 183/79, tachycardic at 118.  O2 sat 92% on room air.  Febrile at 101.7.  Mildly tachypneic at 24.  Wheezing on inspiration and expiration.  Mild accessory muscle use.  IV  established, basic labs including lactate/cultures, viral swabs, chest x-ray, UA/urine culture will be obtained in preparation for further evaluation in the main ED.  Anticipate admission.  Patient is agreeable with this plan.    Please see subsequent provider note for further details and disposition

## 2025-01-01 LAB
ANION GAP SERPL CALC-SCNC: 12 MMOL/L (ref 10–20)
BUN SERPL-MCNC: 22 MG/DL (ref 6–23)
CALCIUM SERPL-MCNC: 8.7 MG/DL (ref 8.6–10.3)
CHLORIDE SERPL-SCNC: 101 MMOL/L (ref 98–107)
CO2 SERPL-SCNC: 25 MMOL/L (ref 21–32)
CREAT SERPL-MCNC: 1.01 MG/DL (ref 0.5–1.05)
EGFRCR SERPLBLD CKD-EPI 2021: 59 ML/MIN/1.73M*2
ERYTHROCYTE [DISTWIDTH] IN BLOOD BY AUTOMATED COUNT: 15.9 % (ref 11.5–14.5)
GLUCOSE BLD MANUAL STRIP-MCNC: 291 MG/DL (ref 74–99)
GLUCOSE BLD MANUAL STRIP-MCNC: 331 MG/DL (ref 74–99)
GLUCOSE BLD MANUAL STRIP-MCNC: 406 MG/DL (ref 74–99)
GLUCOSE BLD MANUAL STRIP-MCNC: 418 MG/DL (ref 74–99)
GLUCOSE SERPL-MCNC: 439 MG/DL (ref 74–99)
HCT VFR BLD AUTO: 34.6 % (ref 36–46)
HGB BLD-MCNC: 10.5 G/DL (ref 12–16)
HOLD SPECIMEN: NORMAL
INR PPP: 1.5 (ref 0.9–1.1)
LACTATE SERPL-SCNC: 1.3 MMOL/L (ref 0.4–2)
MAGNESIUM SERPL-MCNC: 1.89 MG/DL (ref 1.6–2.4)
MCH RBC QN AUTO: 27.3 PG (ref 26–34)
MCHC RBC AUTO-ENTMCNC: 30.3 G/DL (ref 32–36)
MCV RBC AUTO: 90 FL (ref 80–100)
NRBC BLD-RTO: 0 /100 WBCS (ref 0–0)
PLATELET # BLD AUTO: 203 X10*3/UL (ref 150–450)
POTASSIUM SERPL-SCNC: 4.7 MMOL/L (ref 3.5–5.3)
PROTHROMBIN TIME: 16.4 SECONDS (ref 9.8–12.8)
RBC # BLD AUTO: 3.85 X10*6/UL (ref 4–5.2)
SODIUM SERPL-SCNC: 133 MMOL/L (ref 136–145)
WBC # BLD AUTO: 7.1 X10*3/UL (ref 4.4–11.3)

## 2025-01-01 PROCEDURE — 2500000002 HC RX 250 W HCPCS SELF ADMINISTERED DRUGS (ALT 637 FOR MEDICARE OP, ALT 636 FOR OP/ED): Performed by: STUDENT IN AN ORGANIZED HEALTH CARE EDUCATION/TRAINING PROGRAM

## 2025-01-01 PROCEDURE — 94640 AIRWAY INHALATION TREATMENT: CPT

## 2025-01-01 PROCEDURE — 83605 ASSAY OF LACTIC ACID: CPT

## 2025-01-01 PROCEDURE — 2500000001 HC RX 250 WO HCPCS SELF ADMINISTERED DRUGS (ALT 637 FOR MEDICARE OP): Performed by: STUDENT IN AN ORGANIZED HEALTH CARE EDUCATION/TRAINING PROGRAM

## 2025-01-01 PROCEDURE — 80048 BASIC METABOLIC PNL TOTAL CA: CPT

## 2025-01-01 PROCEDURE — 2500000001 HC RX 250 WO HCPCS SELF ADMINISTERED DRUGS (ALT 637 FOR MEDICARE OP): Performed by: INTERNAL MEDICINE

## 2025-01-01 PROCEDURE — 82947 ASSAY GLUCOSE BLOOD QUANT: CPT

## 2025-01-01 PROCEDURE — 2500000005 HC RX 250 GENERAL PHARMACY W/O HCPCS: Performed by: EMERGENCY MEDICINE

## 2025-01-01 PROCEDURE — 85027 COMPLETE CBC AUTOMATED: CPT

## 2025-01-01 PROCEDURE — 2500000002 HC RX 250 W HCPCS SELF ADMINISTERED DRUGS (ALT 637 FOR MEDICARE OP, ALT 636 FOR OP/ED): Performed by: EMERGENCY MEDICINE

## 2025-01-01 PROCEDURE — 85610 PROTHROMBIN TIME: CPT

## 2025-01-01 PROCEDURE — 83735 ASSAY OF MAGNESIUM: CPT

## 2025-01-01 PROCEDURE — 1200000002 HC GENERAL ROOM WITH TELEMETRY DAILY

## 2025-01-01 PROCEDURE — 2500000002 HC RX 250 W HCPCS SELF ADMINISTERED DRUGS (ALT 637 FOR MEDICARE OP, ALT 636 FOR OP/ED)

## 2025-01-01 PROCEDURE — 2500000001 HC RX 250 WO HCPCS SELF ADMINISTERED DRUGS (ALT 637 FOR MEDICARE OP)

## 2025-01-01 PROCEDURE — 2500000004 HC RX 250 GENERAL PHARMACY W/ HCPCS (ALT 636 FOR OP/ED)

## 2025-01-01 PROCEDURE — 36415 COLL VENOUS BLD VENIPUNCTURE: CPT

## 2025-01-01 RX ORDER — BENZONATATE 100 MG/1
100 CAPSULE ORAL 3 TIMES DAILY PRN
Status: DISCONTINUED | OUTPATIENT
Start: 2025-01-01 | End: 2025-01-08 | Stop reason: HOSPADM

## 2025-01-01 RX ORDER — INSULIN LISPRO 100 [IU]/ML
25 INJECTION, SOLUTION INTRAVENOUS; SUBCUTANEOUS ONCE
Status: COMPLETED | OUTPATIENT
Start: 2025-01-01 | End: 2025-01-01

## 2025-01-01 RX ORDER — FUROSEMIDE 40 MG/1
40 TABLET ORAL DAILY
Status: DISCONTINUED | OUTPATIENT
Start: 2025-01-01 | End: 2025-01-08 | Stop reason: HOSPADM

## 2025-01-01 RX ORDER — INSULIN LISPRO 100 [IU]/ML
20 INJECTION, SOLUTION INTRAVENOUS; SUBCUTANEOUS ONCE
Status: COMPLETED | OUTPATIENT
Start: 2025-01-01 | End: 2025-01-01

## 2025-01-01 RX ADMIN — AMLODIPINE BESYLATE 5 MG: 5 TABLET ORAL at 09:35

## 2025-01-01 RX ADMIN — LEVOTHYROXINE SODIUM 75 MCG: 0.07 TABLET ORAL at 05:49

## 2025-01-01 RX ADMIN — INSULIN LISPRO 20 UNITS: 100 INJECTION, SOLUTION INTRAVENOUS; SUBCUTANEOUS at 09:23

## 2025-01-01 RX ADMIN — IPRATROPIUM BROMIDE AND ALBUTEROL SULFATE 3 ML: .5; 3 SOLUTION RESPIRATORY (INHALATION) at 05:52

## 2025-01-01 RX ADMIN — BENZONATATE 100 MG: 100 CAPSULE ORAL at 18:27

## 2025-01-01 RX ADMIN — WARFARIN SODIUM 2.5 MG: 2.5 TABLET ORAL at 09:32

## 2025-01-01 RX ADMIN — METHYLPREDNISOLONE SODIUM SUCCINATE 40 MG: 40 INJECTION, POWDER, FOR SOLUTION INTRAMUSCULAR; INTRAVENOUS at 14:44

## 2025-01-01 RX ADMIN — Medication 2 L/MIN: at 21:56

## 2025-01-01 RX ADMIN — INSULIN LISPRO 25 UNITS: 100 INJECTION, SOLUTION INTRAVENOUS; SUBCUTANEOUS at 12:22

## 2025-01-01 RX ADMIN — METHYLPREDNISOLONE SODIUM SUCCINATE 40 MG: 40 INJECTION, POWDER, FOR SOLUTION INTRAMUSCULAR; INTRAVENOUS at 21:47

## 2025-01-01 RX ADMIN — Medication 2 L/MIN: at 09:36

## 2025-01-01 RX ADMIN — ALBUTEROL SULFATE 2.5 MG: 2.5 SOLUTION RESPIRATORY (INHALATION) at 14:59

## 2025-01-01 RX ADMIN — METHYLPREDNISOLONE SODIUM SUCCINATE 40 MG: 40 INJECTION, POWDER, FOR SOLUTION INTRAMUSCULAR; INTRAVENOUS at 05:49

## 2025-01-01 RX ADMIN — ASPIRIN 81 MG: 81 TABLET, COATED ORAL at 09:35

## 2025-01-01 RX ADMIN — FUROSEMIDE 40 MG: 40 TABLET ORAL at 12:16

## 2025-01-01 RX ADMIN — IPRATROPIUM BROMIDE AND ALBUTEROL SULFATE 3 ML: .5; 3 SOLUTION RESPIRATORY (INHALATION) at 19:36

## 2025-01-01 RX ADMIN — INSULIN LISPRO 6 UNITS: 100 INJECTION, SOLUTION INTRAVENOUS; SUBCUTANEOUS at 16:55

## 2025-01-01 RX ADMIN — IPRATROPIUM BROMIDE AND ALBUTEROL SULFATE 3 ML: .5; 3 SOLUTION RESPIRATORY (INHALATION) at 12:15

## 2025-01-01 SDOH — HEALTH STABILITY: MENTAL HEALTH
DO YOU FEEL STRESS - TENSE, RESTLESS, NERVOUS, OR ANXIOUS, OR UNABLE TO SLEEP AT NIGHT BECAUSE YOUR MIND IS TROUBLED ALL THE TIME - THESE DAYS?: NOT AT ALL

## 2025-01-01 SDOH — ECONOMIC STABILITY: FOOD INSECURITY: HOW HARD IS IT FOR YOU TO PAY FOR THE VERY BASICS LIKE FOOD, HOUSING, MEDICAL CARE, AND HEATING?: NOT HARD AT ALL

## 2025-01-01 SDOH — SOCIAL STABILITY: SOCIAL INSECURITY: WITHIN THE LAST YEAR, HAVE YOU BEEN HUMILIATED OR EMOTIONALLY ABUSED IN OTHER WAYS BY YOUR PARTNER OR EX-PARTNER?: NO

## 2025-01-01 SDOH — SOCIAL STABILITY: SOCIAL INSECURITY: DO YOU FEEL UNSAFE GOING BACK TO THE PLACE WHERE YOU ARE LIVING?: NO

## 2025-01-01 SDOH — SOCIAL STABILITY: SOCIAL INSECURITY
WITHIN THE LAST YEAR, HAVE YOU BEEN RAPED OR FORCED TO HAVE ANY KIND OF SEXUAL ACTIVITY BY YOUR PARTNER OR EX-PARTNER?: NO

## 2025-01-01 SDOH — ECONOMIC STABILITY: HOUSING INSECURITY: IN THE LAST 12 MONTHS, WAS THERE A TIME WHEN YOU WERE NOT ABLE TO PAY THE MORTGAGE OR RENT ON TIME?: NO

## 2025-01-01 SDOH — SOCIAL STABILITY: SOCIAL INSECURITY: HAVE YOU HAD THOUGHTS OF HARMING ANYONE ELSE?: NO

## 2025-01-01 SDOH — SOCIAL STABILITY: SOCIAL INSECURITY: WITHIN THE LAST YEAR, HAVE YOU BEEN AFRAID OF YOUR PARTNER OR EX-PARTNER?: NO

## 2025-01-01 SDOH — ECONOMIC STABILITY: FOOD INSECURITY: WITHIN THE PAST 12 MONTHS, YOU WORRIED THAT YOUR FOOD WOULD RUN OUT BEFORE YOU GOT THE MONEY TO BUY MORE.: NEVER TRUE

## 2025-01-01 SDOH — SOCIAL STABILITY: SOCIAL NETWORK
IN A TYPICAL WEEK, HOW MANY TIMES DO YOU TALK ON THE PHONE WITH FAMILY, FRIENDS, OR NEIGHBORS?: MORE THAN THREE TIMES A WEEK

## 2025-01-01 SDOH — SOCIAL STABILITY: SOCIAL INSECURITY: ARE THERE ANY APPARENT SIGNS OF INJURIES/BEHAVIORS THAT COULD BE RELATED TO ABUSE/NEGLECT?: NO

## 2025-01-01 SDOH — ECONOMIC STABILITY: INCOME INSECURITY: IN THE PAST 12 MONTHS HAS THE ELECTRIC, GAS, OIL, OR WATER COMPANY THREATENED TO SHUT OFF SERVICES IN YOUR HOME?: NO

## 2025-01-01 SDOH — SOCIAL STABILITY: SOCIAL INSECURITY: ARE YOU OR HAVE YOU BEEN THREATENED OR ABUSED PHYSICALLY, EMOTIONALLY, OR SEXUALLY BY ANYONE?: NO

## 2025-01-01 SDOH — SOCIAL STABILITY: SOCIAL INSECURITY
WITHIN THE LAST YEAR, HAVE YOU BEEN KICKED, HIT, SLAPPED, OR OTHERWISE PHYSICALLY HURT BY YOUR PARTNER OR EX-PARTNER?: NO

## 2025-01-01 SDOH — HEALTH STABILITY: PHYSICAL HEALTH: ON AVERAGE, HOW MANY DAYS PER WEEK DO YOU ENGAGE IN MODERATE TO STRENUOUS EXERCISE (LIKE A BRISK WALK)?: 2 DAYS

## 2025-01-01 SDOH — ECONOMIC STABILITY: TRANSPORTATION INSECURITY: IN THE PAST 12 MONTHS, HAS LACK OF TRANSPORTATION KEPT YOU FROM MEDICAL APPOINTMENTS OR FROM GETTING MEDICATIONS?: NO

## 2025-01-01 SDOH — SOCIAL STABILITY: SOCIAL INSECURITY: ABUSE: ADULT

## 2025-01-01 SDOH — ECONOMIC STABILITY: HOUSING INSECURITY: AT ANY TIME IN THE PAST 12 MONTHS, WERE YOU HOMELESS OR LIVING IN A SHELTER (INCLUDING NOW)?: NO

## 2025-01-01 SDOH — SOCIAL STABILITY: SOCIAL INSECURITY: HAS ANYONE EVER THREATENED TO HURT YOUR FAMILY OR YOUR PETS?: NO

## 2025-01-01 SDOH — SOCIAL STABILITY: SOCIAL INSECURITY: DO YOU FEEL ANYONE HAS EXPLOITED OR TAKEN ADVANTAGE OF YOU FINANCIALLY OR OF YOUR PERSONAL PROPERTY?: NO

## 2025-01-01 SDOH — SOCIAL STABILITY: SOCIAL INSECURITY: ARE YOU MARRIED, WIDOWED, DIVORCED, SEPARATED, NEVER MARRIED, OR LIVING WITH A PARTNER?: WIDOWED

## 2025-01-01 SDOH — SOCIAL STABILITY: SOCIAL NETWORK: HOW OFTEN DO YOU GET TOGETHER WITH FRIENDS OR RELATIVES?: TWICE A WEEK

## 2025-01-01 SDOH — ECONOMIC STABILITY: FOOD INSECURITY: WITHIN THE PAST 12 MONTHS, THE FOOD YOU BOUGHT JUST DIDN'T LAST AND YOU DIDN'T HAVE MONEY TO GET MORE.: NEVER TRUE

## 2025-01-01 SDOH — SOCIAL STABILITY: SOCIAL NETWORK
DO YOU BELONG TO ANY CLUBS OR ORGANIZATIONS SUCH AS CHURCH GROUPS, UNIONS, FRATERNAL OR ATHLETIC GROUPS, OR SCHOOL GROUPS?: NO

## 2025-01-01 SDOH — SOCIAL STABILITY: SOCIAL INSECURITY: WERE YOU ABLE TO COMPLETE ALL THE BEHAVIORAL HEALTH SCREENINGS?: YES

## 2025-01-01 SDOH — SOCIAL STABILITY: SOCIAL NETWORK: HOW OFTEN DO YOU ATTEND MEETINGS OF THE CLUBS OR ORGANIZATIONS YOU BELONG TO?: NEVER

## 2025-01-01 SDOH — HEALTH STABILITY: PHYSICAL HEALTH: ON AVERAGE, HOW MANY MINUTES DO YOU ENGAGE IN EXERCISE AT THIS LEVEL?: 30 MIN

## 2025-01-01 SDOH — SOCIAL STABILITY: SOCIAL NETWORK: HOW OFTEN DO YOU ATTEND CHURCH OR RELIGIOUS SERVICES?: MORE THAN 4 TIMES PER YEAR

## 2025-01-01 SDOH — ECONOMIC STABILITY: HOUSING INSECURITY: IN THE PAST 12 MONTHS, HOW MANY TIMES HAVE YOU MOVED WHERE YOU WERE LIVING?: 0

## 2025-01-01 SDOH — SOCIAL STABILITY: SOCIAL INSECURITY: DOES ANYONE TRY TO KEEP YOU FROM HAVING/CONTACTING OTHER FRIENDS OR DOING THINGS OUTSIDE YOUR HOME?: NO

## 2025-01-01 ASSESSMENT — ACTIVITIES OF DAILY LIVING (ADL)
GROOMING: INDEPENDENT
ADEQUATE_TO_COMPLETE_ADL: YES
ASSISTIVE_DEVICE: EYEGLASSES
HEARING - LEFT EAR: FUNCTIONAL
LACK_OF_TRANSPORTATION: NO
GROOMING: INDEPENDENT
BATHING: INDEPENDENT
TOILETING: INDEPENDENT
FEEDING YOURSELF: INDEPENDENT
JUDGMENT_ADEQUATE_SAFELY_COMPLETE_DAILY_ACTIVITIES: YES
WALKS IN HOME: INDEPENDENT
BATHING: INDEPENDENT
FEEDING YOURSELF: INDEPENDENT
PATIENT'S MEMORY ADEQUATE TO SAFELY COMPLETE DAILY ACTIVITIES?: YES
DRESSING YOURSELF: INDEPENDENT
HEARING - RIGHT EAR: FUNCTIONAL
JUDGMENT_ADEQUATE_SAFELY_COMPLETE_DAILY_ACTIVITIES: YES
LACK_OF_TRANSPORTATION: NO
ADEQUATE_TO_COMPLETE_ADL: YES
TOILETING: INDEPENDENT
PATIENT'S MEMORY ADEQUATE TO SAFELY COMPLETE DAILY ACTIVITIES?: YES
HEARING - LEFT EAR: FUNCTIONAL
HEARING - RIGHT EAR: FUNCTIONAL
LACK_OF_TRANSPORTATION: NO
DRESSING YOURSELF: INDEPENDENT
ASSISTIVE_DEVICE: EYEGLASSES

## 2025-01-01 ASSESSMENT — COGNITIVE AND FUNCTIONAL STATUS - GENERAL
DAILY ACTIVITIY SCORE: 24
WALKING IN HOSPITAL ROOM: A LITTLE
CLIMB 3 TO 5 STEPS WITH RAILING: A LITTLE
MOVING TO AND FROM BED TO CHAIR: A LITTLE
MOBILITY SCORE: 22
DAILY ACTIVITIY SCORE: 24
MOBILITY SCORE: 21
PATIENT BASELINE BEDBOUND: NO
CLIMB 3 TO 5 STEPS WITH RAILING: A LITTLE
WALKING IN HOSPITAL ROOM: A LITTLE

## 2025-01-01 ASSESSMENT — PATIENT HEALTH QUESTIONNAIRE - PHQ9
2. FEELING DOWN, DEPRESSED OR HOPELESS: NOT AT ALL
SUM OF ALL RESPONSES TO PHQ9 QUESTIONS 1 & 2: 0
1. LITTLE INTEREST OR PLEASURE IN DOING THINGS: NOT AT ALL

## 2025-01-01 ASSESSMENT — LIFESTYLE VARIABLES
HOW OFTEN DO YOU HAVE 6 OR MORE DRINKS ON ONE OCCASION: NEVER
HOW OFTEN DO YOU HAVE A DRINK CONTAINING ALCOHOL: NEVER
SKIP TO QUESTIONS 9-10: 1
AUDIT-C TOTAL SCORE: 0
AUDIT-C TOTAL SCORE: 0
HOW MANY STANDARD DRINKS CONTAINING ALCOHOL DO YOU HAVE ON A TYPICAL DAY: PATIENT DOES NOT DRINK

## 2025-01-01 ASSESSMENT — PAIN SCALES - GENERAL: PAINLEVEL_OUTOF10: 0 - NO PAIN

## 2025-01-01 NOTE — H&P
History Of Present Illness  Genet Wilks is a 74 y.o. female past medical history for mitral valve replacement on Coumadin, HTN, HLD, DM and CHF on 2 L NC prn Q HS presenting to ED today from home by herself for evaluation of cough and shortness of breath.  For last 3 days the patient has had a productive cough, shortness of breath, chills, fever and nasal congestion.  No sick contacts.  No chest discomfort, weight gain, denies orthopnea, mild lower extremity edema.       ED course: Patient hypertensive, tachycardic, febrile and hypoxic on presentation.  Per radiology, there is no acute cardiopulmonary process on imaging.  Flu positive.  Blood cultures collected.  Labs remarkable for hyperglycemia of 258, hypomagnesemia of 1.44, elevated lactate of 3.0 with repeat of 2.2, elevated BNP of 230, elevated troponin of 23 with repeat of 27, elevated INR of 2.3.  Patient out of the window for Tamiflu initiation.  She was treated with fluids, breathing treatments, Solu-Medrol, and magnesium replaced.     Past Medical History  Past Medical History:   Diagnosis Date    Long term (current) use of anticoagulants     Long-term (current) use of anticoagulants    Old myocardial infarction     History of non-ST elevation myocardial infarction (NSTEMI)    Personal history of (corrected) congenital malformations of heart and circulatory system     History of congenital anomaly of heart    Personal history of other diseases of the circulatory system     History of rheumatic fever    Personal history of other diseases of the circulatory system     History of mitral valve prolapse    Personal history of other diseases of the circulatory system     History of atrial fibrillation    Personal history of other diseases of the musculoskeletal system and connective tissue     History of back pain    Personal history of other diseases of the nervous system and sense organs     History of carpal tunnel syndrome    Personal history of other  diseases of the respiratory system     History of asthma    Personal history of other endocrine, nutritional and metabolic disease     History of morbid obesity    Personal history of other endocrine, nutritional and metabolic disease     History of hypothyroidism    Personal history of other endocrine, nutritional and metabolic disease     History of hypercholesterolemia    Personal history of other specified conditions     History of chest pain    Presence of other heart-valve replacement     Heart valve replaced by other means    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    Mechanical heart valve present       Surgical History  Past Surgical History:   Procedure Laterality Date    CARDIAC CATHETERIZATION  06/15/2018    Cardiac Cath Procedure Outcome:    CARDIAC SURGERY  11/13/2014    Heart Surgery    KNEE SURGERY  06/15/2018    Knee Surgery    OTHER SURGICAL HISTORY  11/13/2014    Surgery    OTHER SURGICAL HISTORY  11/13/2014    Colposcopy Cervix With Biopsy(S)    TONSILLECTOMY  11/13/2014    Tonsillectomy    TUBAL LIGATION  11/13/2014    Tubal Ligation        Social History  She reports that she has never smoked. She has never been exposed to tobacco smoke. She has never used smokeless tobacco. She reports that she does not currently use alcohol. She reports that she does not use drugs.    Family History  Family History   Problem Relation Name Age of Onset    Diabetes Mother      Stroke Father      Hypertension Father      Diabetes Sister      Breast cancer Sister      Throat cancer Brother      Diabetes Brother      Other (S/P CABG x3) Brother      Cancer Other sibling         Allergies  Penicillins, Prednisone, Mushroom, and Simvastatin    Review of Systems   10 point ROS negative except as specified in HPI    Physical Exam  HENT:      Head: Normocephalic and atraumatic.   Eyes:      Conjunctiva/sclera: Conjunctivae normal.   Cardiovascular:      Rate and Rhythm: Normal rate. Rhythm irregular.   Pulmonary:  "     Effort: Pulmonary effort is normal.      Breath sounds: Wheezing (Expiratory throughout lung fields) present.      Comments: Dry cough  Abdominal:      General: Abdomen is flat.      Palpations: Abdomen is soft.   Musculoskeletal:         General: No swelling (Trace in lower extremities).      Cervical back: Neck supple.   Skin:     General: Skin is warm and dry.   Neurological:      Mental Status: She is alert. Mental status is at baseline.   Psychiatric:         Behavior: Behavior normal.          Last Recorded Vitals  Blood pressure 134/70, pulse 77, temperature 36.3 °C (97.3 °F), temperature source Temporal, resp. rate 18, height 1.575 m (5' 2\"), weight 95.3 kg (210 lb), SpO2 94%.    Relevant Results    Results for orders placed or performed during the hospital encounter of 12/31/24 (from the past 24 hours)   Lactate   Result Value Ref Range    Lactate 1.3 0.4 - 2.0 mmol/L   Magnesium   Result Value Ref Range    Magnesium 1.89 1.60 - 2.40 mg/dL   CBC   Result Value Ref Range    WBC 7.1 4.4 - 11.3 x10*3/uL    nRBC 0.0 0.0 - 0.0 /100 WBCs    RBC 3.85 (L) 4.00 - 5.20 x10*6/uL    Hemoglobin 10.5 (L) 12.0 - 16.0 g/dL    Hematocrit 34.6 (L) 36.0 - 46.0 %    MCV 90 80 - 100 fL    MCH 27.3 26.0 - 34.0 pg    MCHC 30.3 (L) 32.0 - 36.0 g/dL    RDW 15.9 (H) 11.5 - 14.5 %    Platelets 203 150 - 450 x10*3/uL   Basic metabolic panel   Result Value Ref Range    Glucose 439 (H) 74 - 99 mg/dL    Sodium 133 (L) 136 - 145 mmol/L    Potassium 4.7 3.5 - 5.3 mmol/L    Chloride 101 98 - 107 mmol/L    Bicarbonate 25 21 - 32 mmol/L    Anion Gap 12 10 - 20 mmol/L    Urea Nitrogen 22 6 - 23 mg/dL    Creatinine 1.01 0.50 - 1.05 mg/dL    eGFR 59 (L) >60 mL/min/1.73m*2    Calcium 8.7 8.6 - 10.3 mg/dL   Protime-INR   Result Value Ref Range    Protime 16.4 (H) 9.8 - 12.8 seconds    INR 1.5 (H) 0.9 - 1.1   POCT GLUCOSE   Result Value Ref Range    POCT Glucose 406 (H) 74 - 99 mg/dL   POCT GLUCOSE   Result Value Ref Range    POCT Glucose 418 " (H) 74 - 99 mg/dL     XR chest 2 views    Result Date: 12/31/2024  Interpreted By:  Silvano Verma, STUDY: XR CHEST 2 VIEWS;  12/31/2024 11:48 am   INDICATION: Signs/Symptoms:Short of breath, cough.   COMPARISON: 02/13/2024   ACCESSION NUMBER(S): [id number]   ORDERING CLINICIAN: HSAD MEZA   FINDINGS: CARDIOMEDIASTINAL SILHOUETTE AND VASCULATURE:   Cardiac size:  Within normal limits. Status post median sternotomy with valve replacement similar to the previous exam. Aortic shadow: Within normal limits.   Mediastinal contours: Within normal limits.   Pulmonary vasculature:  The central vasculature is unremarkable   LUNGS: Multiple punctate radiodensities overlie the chest, which may be within clothing or artifact. Given this the lungs appear clear without discrete infiltrate or effusion.   ABDOMEN AND OTHER FINDINGS: No remarkable upper abdominal findings.   BONES: No acute osseous changes.       1.  No active cardiopulmonary disease.  There has not been significant interval change from the prior exam.   Signed by: Silvano Verma 12/31/2024 12:20 PM Dictation workstation:   [id number]        Assessment/Plan   Assessment & Plan  Influenza A    74-year-old female with past medical history of CHF on 2L nasal cannula as needed presented to ED for flulike symptoms found to be flu a positive.  Patient treated with Solu-Medrol, oxygen, breathing treatments in the ED and admitted to inpatient with telemetry under Dr. Hansen for further evaluation and care.    #Influenza A  #COPD exacerbation  #Hypomagnesemia  #A-fib with RVR  #Lactatemia  - Treated with oxygen, breathing treatments, Solu-Medrol and magnesium replaced in ED  - Continue above treatments  - IV fluids, trend lactate  - Vitals every 4 hours, telemetry  - Droplet precautions  - PT/OT/social work  -Continue home Coumadin, SCD for DVT PPx  - Head of bed 30 degrees, aspiration precautions    Chronic conditions  #Mitral valve replacement on Coumadin  #HTN  #HLD  #DM  #CHF  on 2L nasal cannula as needed  - Continue home meds  - Cardiac diet       Patient fully evaluated 01/01, continue droplet isolation. continue symptomatic treatment for influenza A, Patient on coumadin r/t mitral valve replacement, cardiology consulted- seen by Dr. Day on 12/11/2023, appreciate input. Will trend PTT/INR, Will order chest physiotherapy, incentive spirometry, ambulate patient. Blood cultures in process, awaiting final result. Will repeat labs, chest xray in AM and continue plan as above.  I spent 75 minutes in the professional and overall care of this patient.      [place] Field

## 2025-01-02 ENCOUNTER — APPOINTMENT (OUTPATIENT)
Dept: RADIOLOGY | Facility: HOSPITAL | Age: 75
End: 2025-01-02
Payer: MEDICARE

## 2025-01-02 LAB
ALBUMIN SERPL BCP-MCNC: 3.7 G/DL (ref 3.4–5)
ALP SERPL-CCNC: 81 U/L (ref 33–136)
ALT SERPL W P-5'-P-CCNC: 40 U/L (ref 7–45)
ANION GAP SERPL CALC-SCNC: 11 MMOL/L (ref 10–20)
AST SERPL W P-5'-P-CCNC: 45 U/L (ref 9–39)
BASOPHILS # BLD AUTO: 0.01 X10*3/UL (ref 0–0.1)
BASOPHILS NFR BLD AUTO: 0.1 %
BILIRUB SERPL-MCNC: 1.4 MG/DL (ref 0–1.2)
BUN SERPL-MCNC: 32 MG/DL (ref 6–23)
CALCIUM SERPL-MCNC: 8.9 MG/DL (ref 8.6–10.3)
CHLORIDE SERPL-SCNC: 99 MMOL/L (ref 98–107)
CO2 SERPL-SCNC: 31 MMOL/L (ref 21–32)
CREAT SERPL-MCNC: 1.08 MG/DL (ref 0.5–1.05)
EGFRCR SERPLBLD CKD-EPI 2021: 55 ML/MIN/1.73M*2
EOSINOPHIL # BLD AUTO: 0 X10*3/UL (ref 0–0.4)
EOSINOPHIL NFR BLD AUTO: 0 %
ERYTHROCYTE [DISTWIDTH] IN BLOOD BY AUTOMATED COUNT: 16 % (ref 11.5–14.5)
GLUCOSE BLD MANUAL STRIP-MCNC: 332 MG/DL (ref 74–99)
GLUCOSE BLD MANUAL STRIP-MCNC: 344 MG/DL (ref 74–99)
GLUCOSE BLD MANUAL STRIP-MCNC: 363 MG/DL (ref 74–99)
GLUCOSE BLD MANUAL STRIP-MCNC: 367 MG/DL (ref 74–99)
GLUCOSE BLD MANUAL STRIP-MCNC: 402 MG/DL (ref 74–99)
GLUCOSE SERPL-MCNC: 382 MG/DL (ref 74–99)
HCT VFR BLD AUTO: 36 % (ref 36–46)
HGB BLD-MCNC: 11 G/DL (ref 12–16)
IMM GRANULOCYTES # BLD AUTO: 0.09 X10*3/UL (ref 0–0.5)
IMM GRANULOCYTES NFR BLD AUTO: 0.6 % (ref 0–0.9)
INR PPP: 1.4 (ref 0.9–1.1)
LYMPHOCYTES # BLD AUTO: 0.61 X10*3/UL (ref 0.8–3)
LYMPHOCYTES NFR BLD AUTO: 4.3 %
MCH RBC QN AUTO: 27.4 PG (ref 26–34)
MCHC RBC AUTO-ENTMCNC: 30.6 G/DL (ref 32–36)
MCV RBC AUTO: 90 FL (ref 80–100)
MONOCYTES # BLD AUTO: 0.45 X10*3/UL (ref 0.05–0.8)
MONOCYTES NFR BLD AUTO: 3.2 %
NEUTROPHILS # BLD AUTO: 12.9 X10*3/UL (ref 1.6–5.5)
NEUTROPHILS NFR BLD AUTO: 91.8 %
NRBC BLD-RTO: 0 /100 WBCS (ref 0–0)
PLATELET # BLD AUTO: 219 X10*3/UL (ref 150–450)
POTASSIUM SERPL-SCNC: 4.5 MMOL/L (ref 3.5–5.3)
PROT SERPL-MCNC: 6.5 G/DL (ref 6.4–8.2)
PROTHROMBIN TIME: 15.5 SECONDS (ref 9.8–12.8)
Q ONSET: 225 MS
QRS COUNT: 18 BEATS
QRS DURATION: 102 MS
QT INTERVAL: 322 MS
QTC CALCULATION(BAZETT): 435 MS
QTC FREDERICIA: 394 MS
R AXIS: 96 DEGREES
RBC # BLD AUTO: 4.01 X10*6/UL (ref 4–5.2)
SODIUM SERPL-SCNC: 136 MMOL/L (ref 136–145)
T AXIS: 0 DEGREES
T OFFSET: 386 MS
VENTRICULAR RATE: 110 BPM
WBC # BLD AUTO: 14.1 X10*3/UL (ref 4.4–11.3)

## 2025-01-02 PROCEDURE — 2500000002 HC RX 250 W HCPCS SELF ADMINISTERED DRUGS (ALT 637 FOR MEDICARE OP, ALT 636 FOR OP/ED): Performed by: STUDENT IN AN ORGANIZED HEALTH CARE EDUCATION/TRAINING PROGRAM

## 2025-01-02 PROCEDURE — 2500000001 HC RX 250 WO HCPCS SELF ADMINISTERED DRUGS (ALT 637 FOR MEDICARE OP): Performed by: STUDENT IN AN ORGANIZED HEALTH CARE EDUCATION/TRAINING PROGRAM

## 2025-01-02 PROCEDURE — 85610 PROTHROMBIN TIME: CPT

## 2025-01-02 PROCEDURE — 2500000001 HC RX 250 WO HCPCS SELF ADMINISTERED DRUGS (ALT 637 FOR MEDICARE OP)

## 2025-01-02 PROCEDURE — 85025 COMPLETE CBC W/AUTO DIFF WBC: CPT | Performed by: INTERNAL MEDICINE

## 2025-01-02 PROCEDURE — 2500000005 HC RX 250 GENERAL PHARMACY W/O HCPCS: Performed by: EMERGENCY MEDICINE

## 2025-01-02 PROCEDURE — 99223 1ST HOSP IP/OBS HIGH 75: CPT | Performed by: INTERNAL MEDICINE

## 2025-01-02 PROCEDURE — 2500000004 HC RX 250 GENERAL PHARMACY W/ HCPCS (ALT 636 FOR OP/ED)

## 2025-01-02 PROCEDURE — 36415 COLL VENOUS BLD VENIPUNCTURE: CPT | Performed by: INTERNAL MEDICINE

## 2025-01-02 PROCEDURE — 82947 ASSAY GLUCOSE BLOOD QUANT: CPT

## 2025-01-02 PROCEDURE — 2500000002 HC RX 250 W HCPCS SELF ADMINISTERED DRUGS (ALT 637 FOR MEDICARE OP, ALT 636 FOR OP/ED)

## 2025-01-02 PROCEDURE — 99222 1ST HOSP IP/OBS MODERATE 55: CPT | Performed by: STUDENT IN AN ORGANIZED HEALTH CARE EDUCATION/TRAINING PROGRAM

## 2025-01-02 PROCEDURE — 97165 OT EVAL LOW COMPLEX 30 MIN: CPT | Mod: GO

## 2025-01-02 PROCEDURE — 71046 X-RAY EXAM CHEST 2 VIEWS: CPT

## 2025-01-02 PROCEDURE — 1200000002 HC GENERAL ROOM WITH TELEMETRY DAILY

## 2025-01-02 PROCEDURE — 2500000002 HC RX 250 W HCPCS SELF ADMINISTERED DRUGS (ALT 637 FOR MEDICARE OP, ALT 636 FOR OP/ED): Performed by: EMERGENCY MEDICINE

## 2025-01-02 PROCEDURE — 80053 COMPREHEN METABOLIC PANEL: CPT | Performed by: INTERNAL MEDICINE

## 2025-01-02 PROCEDURE — 94640 AIRWAY INHALATION TREATMENT: CPT

## 2025-01-02 PROCEDURE — 97161 PT EVAL LOW COMPLEX 20 MIN: CPT | Mod: GP

## 2025-01-02 PROCEDURE — 71046 X-RAY EXAM CHEST 2 VIEWS: CPT | Performed by: RADIOLOGY

## 2025-01-02 RX ORDER — INSULIN LISPRO 100 [IU]/ML
20 INJECTION, SOLUTION INTRAVENOUS; SUBCUTANEOUS ONCE
Status: COMPLETED | OUTPATIENT
Start: 2025-01-02 | End: 2025-01-02

## 2025-01-02 RX ORDER — DEXTROSE 50 % IN WATER (D50W) INTRAVENOUS SYRINGE
25
Status: DISCONTINUED | OUTPATIENT
Start: 2025-01-02 | End: 2025-01-08 | Stop reason: HOSPADM

## 2025-01-02 RX ORDER — DEXTROSE 50 % IN WATER (D50W) INTRAVENOUS SYRINGE
12.5
Status: DISCONTINUED | OUTPATIENT
Start: 2025-01-02 | End: 2025-01-08 | Stop reason: HOSPADM

## 2025-01-02 RX ORDER — INSULIN GLARGINE 100 [IU]/ML
15 INJECTION, SOLUTION SUBCUTANEOUS EVERY 24 HOURS
Status: DISCONTINUED | OUTPATIENT
Start: 2025-01-02 | End: 2025-01-08 | Stop reason: HOSPADM

## 2025-01-02 RX ORDER — INSULIN LISPRO 100 [IU]/ML
10 INJECTION, SOLUTION INTRAVENOUS; SUBCUTANEOUS
Status: DISCONTINUED | OUTPATIENT
Start: 2025-01-03 | End: 2025-01-08 | Stop reason: HOSPADM

## 2025-01-02 RX ADMIN — Medication 2 L/MIN: at 22:19

## 2025-01-02 RX ADMIN — IPRATROPIUM BROMIDE AND ALBUTEROL SULFATE 3 ML: .5; 3 SOLUTION RESPIRATORY (INHALATION) at 07:15

## 2025-01-02 RX ADMIN — FUROSEMIDE 40 MG: 40 TABLET ORAL at 08:33

## 2025-01-02 RX ADMIN — INSULIN LISPRO 10 UNITS: 100 INJECTION, SOLUTION INTRAVENOUS; SUBCUTANEOUS at 08:39

## 2025-01-02 RX ADMIN — IPRATROPIUM BROMIDE AND ALBUTEROL SULFATE 3 ML: .5; 3 SOLUTION RESPIRATORY (INHALATION) at 13:48

## 2025-01-02 RX ADMIN — METHYLPREDNISOLONE SODIUM SUCCINATE 40 MG: 40 INJECTION, POWDER, FOR SOLUTION INTRAMUSCULAR; INTRAVENOUS at 14:06

## 2025-01-02 RX ADMIN — METHYLPREDNISOLONE SODIUM SUCCINATE 40 MG: 40 INJECTION, POWDER, FOR SOLUTION INTRAMUSCULAR; INTRAVENOUS at 22:05

## 2025-01-02 RX ADMIN — INSULIN LISPRO 8 UNITS: 100 INJECTION, SOLUTION INTRAVENOUS; SUBCUTANEOUS at 17:56

## 2025-01-02 RX ADMIN — WARFARIN SODIUM 2.5 MG: 2.5 TABLET ORAL at 17:59

## 2025-01-02 RX ADMIN — INSULIN GLARGINE 15 UNITS: 100 INJECTION, SOLUTION SUBCUTANEOUS at 22:05

## 2025-01-02 RX ADMIN — ASPIRIN 81 MG: 81 TABLET, COATED ORAL at 08:33

## 2025-01-02 RX ADMIN — METHYLPREDNISOLONE SODIUM SUCCINATE 40 MG: 40 INJECTION, POWDER, FOR SOLUTION INTRAMUSCULAR; INTRAVENOUS at 06:39

## 2025-01-02 RX ADMIN — IPRATROPIUM BROMIDE AND ALBUTEROL SULFATE 3 ML: .5; 3 SOLUTION RESPIRATORY (INHALATION) at 20:13

## 2025-01-02 RX ADMIN — LEVOTHYROXINE SODIUM 75 MCG: 0.07 TABLET ORAL at 06:39

## 2025-01-02 RX ADMIN — INSULIN LISPRO 20 UNITS: 100 INJECTION, SOLUTION INTRAVENOUS; SUBCUTANEOUS at 12:18

## 2025-01-02 RX ADMIN — AMLODIPINE BESYLATE 5 MG: 5 TABLET ORAL at 08:33

## 2025-01-02 RX ADMIN — Medication 2 L/MIN: at 10:01

## 2025-01-02 ASSESSMENT — COGNITIVE AND FUNCTIONAL STATUS - GENERAL
DAILY ACTIVITIY SCORE: 24
MOBILITY SCORE: 24
WALKING IN HOSPITAL ROOM: A LITTLE
DAILY ACTIVITIY SCORE: 24
MOBILITY SCORE: 22
CLIMB 3 TO 5 STEPS WITH RAILING: A LITTLE

## 2025-01-02 ASSESSMENT — PAIN SCALES - GENERAL
PAINLEVEL_OUTOF10: 0 - NO PAIN

## 2025-01-02 ASSESSMENT — ACTIVITIES OF DAILY LIVING (ADL): ADLS_ADDRESSED: YES

## 2025-01-02 ASSESSMENT — PAIN - FUNCTIONAL ASSESSMENT: PAIN_FUNCTIONAL_ASSESSMENT: 0-10

## 2025-01-02 NOTE — PROGRESS NOTES
Physical Therapy      Physical Therapy Evaluation    Patient Name: Genet Wilks  MRN: [id number]  Today's Date: 1/2/2025   Time Calculation  Start Time: 1020  Stop Time: 1039  Time Calculation (min): 19 min  622/622-A    Assessment/Plan   PT Assessment  PT Assessment Results: Decreased endurance  Rehab Prognosis: Good  Barriers to Discharge Home: No anticipated barriers  Evaluation/Treatment Tolerance: Patient limited by fatigue  Medical Staff Made Aware: Yes  Strengths: Living arrangement secure  Barriers to Participation: Comorbidities  End of Session Communication: Bedside nurse  Assessment Comment: Patient demonstrates independence with functional mobility. Pulmonary status limits activity tolerance.  End of Session Patient Position: Up in chair, Alarm off, not on at start of session  IP OR SWING BED PT PLAN  Inpatient or Swing Bed: Inpatient  PT Plan  PT Plan: PT Eval only  PT Eval Only Reason: No acute PT needs identified  PT Frequency: PT eval only  PT Discharge Recommendations: No further acute PT  PT Recommended Transfer Status: Independent  PT - OK to Discharge: Yes (When cleared by medical team)    Subjective     Current Problem:  1. Influenza A        2. COPD exacerbation (Multi)        3. Hypomagnesemia        4. Atrial fibrillation with RVR (Multi)          Patient Active Problem List   Diagnosis    Encounter for screening mammogram for malignant neoplasm of breast    Mixed hyperlipidemia    Primary hypertension    Vitamin D deficiency    Hospital discharge follow-up    Mucopurulent chronic bronchitis (Multi)    Chronic congestive heart failure    Ankle edema, bilateral    (HFpEF) heart failure with preserved ejection fraction    Abnormal EKG    Abnormal mammogram of right breast    Accidental fall    Blurry vision    Contusion of right forearm    COPD (chronic obstructive pulmonary disease) (Multi)    ROGERS (dyspnea on exertion)    Hypothyroidism    Lumbosacral disc disease    Mechanical heart valve  present    Oxygen dependent    Palpitations    Sciatica of right side    Asthmatic bronchitis (HHS-HCC)    Diabetes mellitus (Multi)    Obesity    Bereavement    High risk medication use    Iron deficiency anemia secondary to inadequate dietary iron intake    Primary osteoarthritis of both knees    Paroxysmal atrial fibrillation (Multi)    Herpes zoster    History of hypothyroidism    History of non-ST elevation myocardial infarction (NSTEMI)    History of obesity    History of rheumatic fever    Personal history of COVID-19    Acute on chronic diastolic (congestive) heart failure    Anticoagulated on Coumadin    Malaise and fatigue    Influenza A       General Visit Information:  General  Reason for Referral: PT Eval and Treat: Influenza A  Referred By: Rod Hansen DO  Family/Caregiver Present: No  Co-Treatment: OT  Co-Treatment Reason: Maximize patient safety and mobility  Prior to Session Communication: Bedside nurse  Patient Position Received: Bed, 2 rail up, Alarm off, not on at start of session  General Comment: 74 year old female admit with c/o of SOB, productive cough, chills and vomiting.  Diagnosed with Influenza A.    Home Living:  Home Living  Type of Home: House  Lives With: Adult children, Grandchildren  Home Adaptive Equipment: None  Home Living Comments: Lives on house with son, daughter in law and 2 grandchildren. Patient's bed and bath are on 1st floor    Prior Level of Function:  Prior Function Per Pt/Caregiver Report  Level of [place]: Independent with ADLs and functional transfers  Hand Dominance: Right  Prior Function Comments: Patient indep with ADL's, drives, cooks, cleans assists with some .Wears ncoturnal O2 at 2L.    Precautions:  Precautions  Hearing/Visual Limitations: wears reading glasses  Medical Precautions: Infection precautions  Precautions Comment: Droplet precautions    Vital Signs:  Vital Signs  SpO2: (!) 87 %  Vital Signs Comment: 87% on room air, up to 95% with  1L O2 replaced, 90% after amb to bathroom and back on room air.  Objective     Pain:  Pain Assessment  Pain Assessment: 0-10  0-10 (Numeric) Pain Score: 0 - No pain    Cognition:  Cognition  Overall Cognitive Status: Within Functional Limits  Orientation Level: Oriented X4    General Assessments:  General Observation  General Observation: Patient pleasant and cooperative with treatment.  Slight SOB noted with cough.  O2 sats rise above 90% with 1L O2.   Activity Tolerance  Endurance: Endurance does not limit participation in activity, Tolerates 10 - 20 min exercise with multiple rests  Activity Tolerance Comments: Tolerated transfers and gait activity with slight drop in O2 sat when only on room air.  O2 sat quickly rises above 90% when placed back on 1L O2  Sensation  Light Touch: No apparent deficits  Strength  Strength Comments: BUE and LE grossly WFL     Coordination  Movements are Fluid and Coordinated: Yes  Finger to Nose: Intact  Heel to Shin: Intact  Finger to Target: Intact  Postural Control  Postural Control: Within Functional Limits  Static Sitting Balance  Static Sitting-Balance Support: Feet supported  Static Sitting-Level of Assistance: Independent  Dynamic Sitting Balance  Dynamic Sitting-Balance Support: Feet supported  Dynamic Sitting-Level of Assistance: Independent  Static Standing Balance  Static Standing-Balance Support: No upper extremity supported  Static Standing-Level of Assistance: Distant supervision  Dynamic Standing Balance  Dynamic Standing-Balance Support: No upper extremity supported  Dynamic Standing-Level of Assistance: Close supervision    Functional Assessments:  ADL  ADL's Addressed: Yes  ADL Comments: Patient demonstrates SBA to modified independence for toileting  Bed Mobility  Bed Mobility: Yes (Modified independent)  Transfers  Transfer: Yes (Modfied independent)  Ambulation/Gait Training  Ambulation/Gait Training Performed: Yes (Supervision assist to ambulate 20 ft into and  out of bathroom and navigate small spaces without loss of balance)          Extremity/Trunk Assessments:  RUE   RUE : Within Functional Limits  LUE   LUE: Within Functional Limits  RLE   RLE : Within Functional Limits  LLE   LLE : Within Functional Limits    Outcome Measures:     [facility] Basic Mobility  Turning from your back to your side while in a flat bed without using bedrails: None  Moving from lying on your back to sitting on the side of a flat bed without using bedrails: None  Moving to and from bed to chair (including a wheelchair): None  Standing up from a chair using your arms (e.g. wheelchair or bedside chair): None  To walk in hospital room: None  Climbing 3-5 steps with railing: None  Basic Mobility - Total Score: 24                                                             Goals:  Encounter Problems       Encounter Problems (Active)       Pain - Adult            Education Documentation  No documentation found.  Education Comments  No comments found.

## 2025-01-02 NOTE — PROGRESS NOTES
Occupational Therapy    Evaluation    Patient Name: Genet Wilks  MRN: [id number]  Department: [facility]  Room: [address] [place] [address]  Today's Date: 1/2/2025       Assessment   Pt demonstrates a decline in adl/functional mob. Recommend  no continuing OT tx intervention. Good prognosis for returning home. Pt up in chair without alarm prior and at end of OT evaluation session.  Plan:   No skilled OT needs identified    Subjective   Current Problem:  1. Influenza A        2. COPD exacerbation (Multi)        3. Hypomagnesemia        4. Atrial fibrillation with RVR (Multi)        Past Medical History  Medical History        Past Medical History:   Diagnosis Date    Long term (current) use of anticoagulants       Long-term (current) use of anticoagulants    Old myocardial infarction       History of non-ST elevation myocardial infarction (NSTEMI)    Personal history of (corrected) congenital malformations of heart and circulatory system       History of congenital anomaly of heart    Personal history of other diseases of the circulatory system       History of rheumatic fever    Personal history of other diseases of the circulatory system       History of mitral valve prolapse    Personal history of other diseases of the circulatory system       History of atrial fibrillation    Personal history of other diseases of the musculoskeletal system and connective tissue       History of back pain    Personal history of other diseases of the nervous system and sense organs       History of carpal tunnel syndrome    Personal history of other diseases of the respiratory system       History of asthma    Personal history of other endocrine, nutritional and metabolic disease       History of morbid obesity    Personal history of other endocrine, nutritional and metabolic disease       History of hypothyroidism    Personal history of other endocrine, nutritional and metabolic disease       History of hypercholesterolemia    Personal history of other  specified conditions       History of chest pain    Presence of other heart-valve replacement       Heart valve replaced by other means    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     Mechanical heart valve present         General:  General  Reason for Referral: OT evalTx/ impaired functional daily living skills  Referred By: Dr Rod aHnsen  Past Medical History Relevant to Rehab:  (r mitral valve replacement on Coumadin, HTN, HLD, DM and CHF on 2 L NC prn)  Precautions:     Fall. influenza A  ,droplet precautions ,back (lumbar disc disease), cardiac (mitral valve replacement  Vital Sign (Past 2hrs)        Date/Time Vitals Session Patient Position Pulse Resp SpO2 BP MAP (mmHg)    01/02/25 12:01:12 --  --  75  --  95 %  136/60  87                        Pain:   0/10 pain/ no numbness/tingling    Objective   Cognition:      Wfl x 3        Home Living: indep at home . 3 entry steps  with rail, pt lives with son and dtr in law. Bed/bath 1st floor, tub/shower without adl equip. No dme for toliet. Pt sleeps in a standard bed      Prior Function: right     IADL History:indep with adl/home mgnt     ADL:indep without adl equip     Activity Tolerance: wfl     Bed Mobility/Transfers:  indep         Functional Mobility:  indep without device        IADL's:    Indep at home  Vision:  wfl  Sensation:wfl     Strength: pt demonstrates below elbow strength wfl     Perception:wfl     Coordination:wfl      Hand Function:right     Extremities:   RUE  wfl and  LUE wfl    Outcome Measures: [facility] Daily Activity  Putting on and taking off regular lower body clothing: None  Bathing (including washing, rinsing, drying): None  Putting on and taking off regular upper body clothing: None  Toileting, which includes using toilet, bedpan or urinal: None  Taking care of personal grooming such as brushing teeth: None  Eating Meals: None  Daily Activity - Total Score: 24      Education Documentation  No documentation found.  Education Comments  No  comments found.      Goals: no skilled OT needs identified

## 2025-01-02 NOTE — CONSULTS
Consults  History Of Present Illness:    Genet Wilks is a 74 y.o. female with PMH of persistent atrial fibrillation, mechanical MVR presenting with acute influenza and pneumonianoted on today's CXR.  She notes some dyspnea and cough     Last Recorded Vitals:  Vitals:    01/02/25 1201 01/02/25 1348 01/02/25 1400 01/02/25 1558   BP: 136/60   (!) 107/48   Patient Position:    Lying   Pulse: 75   74   Resp:    20   Temp: 36.3 °C (97.3 °F)   36.7 °C (98.1 °F)   TempSrc:    Temporal   SpO2: 95% 93% 95% 94%   Weight:       Height:           Last Labs:  CBC - 1/2/2025:  6:59 AM  14.1 11.0 219    36.0      CMP - 1/2/2025:  6:59 AM  8.9 6.5 45 --- 1.4   _ 3.7 40 81      PTT - No results in last year.  1.4   15.5 _     Troponin I, High Sensitivity   Date/Time Value Ref Range Status   12/31/2024 01:36 PM 27 (H) 0 - 13 ng/L Final   12/31/2024 11:41 AM 23 (H) 0 - 13 ng/L Final   12/13/2024 04:40 PM 14 (H) 0 - 13 ng/L Final     BNP   Date/Time Value Ref Range Status   12/31/2024 11:41  (H) 0 - 99 pg/mL Final   12/13/2024 02:27  (H) 0 - 99 pg/mL Final     TR HGBA1C (Data Conversion)   Date/Time Value Ref Range Status   02/02/2023 02:04 PM 6.2 4.2 - 6.5 % Final   05/04/2022 03:50 PM 6.1 4.2 - 6.5 % Final     POCT Hemoglobin A1C   Date/Time Value Ref Range Status   09/09/2024 04:20 PM 7.6 (H) 4.2 - 6.5 % Final   11/09/2023 04:14 PM 6.9 (H) 4.2 - 6.5 % Final     LDL Calculated   Date/Time Value Ref Range Status   09/09/2024 04:30 PM 98 <=99 mg/dL Final     Comment:                                 Near   Borderline      AGE      Desirable  Optimal    High     High     Very High     0-19 Y     0 - 109     ---    110-129   >/= 130     ----    20-24 Y     0 - 119     ---    120-159   >/= 160     ----      >24 Y     0 -  99   100-129  130-159   160-189     >/=190     11/09/2023 03:26 PM 85 <=99 mg/dL Final     Comment:                                 Near   Borderline      AGE      Desirable  Optimal    High     High      Very High     0-19 Y     0 - 109     ---    110-129   >/= 130     ----    20-24 Y     0 - 119     ---    120-159   >/= 160     ----      >24 Y     0 -  99   100-129  130-159   160-189     >/=190       VLDL   Date/Time Value Ref Range Status   09/09/2024 04:30 PM 28 0 - 40 mg/dL Final   11/09/2023 03:26 PM 30 0 - 40 mg/dL Final   05/11/2023 01:57 PM 31 0 - 40 mg/dL Final   02/02/2023 07:44 AM 28 0 - 40 mg/dL Final   05/04/2022 12:00 AM 22 0 - 40 mg/dL Final      Last I/O:  I/O last 3 completed shifts:  In: 225 (2.4 mL/kg) [I.V.:225 (2.4 mL/kg)]  Out: - (0 mL/kg)   Weight: 95.3 kg     Past Cardiology Tests (Last 3 Years):  EKG:  ECG 12 lead 12/31/2024 (Preliminary)      ECG 12 lead (Ancillary Performed) 12/17/2024      ECG 12 Lead 09/05/2024      ECG 12 Lead 03/20/2024      ECG 12 Lead 12/11/2023    Echo:  Transthoracic Echo (TTE) Complete 03/20/2024    Ejection Fractions:  EF   Date/Time Value Ref Range Status   03/20/2024 02:02 PM 63 %      Cath:  No results found for this or any previous visit from the past 1095 days.    Stress Test:  No results found for this or any previous visit from the past 1095 days.    Cardiac Imaging:  No results found for this or any previous visit from the past 1095 days.      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, Personal history of other diseases of the circulatory system, Personal history of other diseases of the circulatory system, Personal history of other diseases of the circulatory system, Personal history of other diseases of the musculoskeletal system and connective tissue, Personal history of other diseases of the nervous system and sense organs, Personal history of other diseases of the respiratory system, Personal history of other endocrine, nutritional and metabolic disease, Personal history of other  endocrine, nutritional and metabolic disease, Personal history of other endocrine, nutritional and metabolic disease, Personal history of other specified conditions, Presence of other heart-valve replacement, and Presence of prosthetic heart valve (06/02/2022).    Past Surgical History:  She has a past surgical history that includes Other surgical history (11/13/2014); Cardiac surgery (11/13/2014); Tubal ligation (11/13/2014); Other surgical history (11/13/2014); Tonsillectomy (11/13/2014); Knee surgery (06/15/2018); and Cardiac catheterization (06/15/2018).      Social History:  She reports that she has never smoked. She has never been exposed to tobacco smoke. She has never used smokeless tobacco. She reports that she does not currently use alcohol. She reports that she does not use drugs.    Family History:  Family History   Problem Relation Name Age of Onset    Diabetes Mother      Stroke Father      Hypertension Father      Diabetes Sister      Breast cancer Sister      Throat cancer Brother      Diabetes Brother      Other (S/P CABG x3) Brother      Cancer Other sibling         Allergies:  Penicillins, Prednisone, Mushroom, and Simvastatin    Inpatient Medications:  Scheduled medications   Medication Dose Route Frequency    amLODIPine  5 mg oral Daily    aspirin  81 mg oral Daily    furosemide  40 mg oral Daily    insulin lispro  0-10 Units subcutaneous TID AC    ipratropium-albuteroL  3 mL nebulization TID    levothyroxine  75 mcg oral Daily    methylPREDNISolone sodium succinate (PF)  40 mg intravenous q8h KIMBER    oxygen   inhalation Continuous - Inhalation    psyllium  1 packet oral Daily    warfarin  2.5 mg oral Once per day on Sunday Tuesday Wednesday Thursday Friday Saturday    [START ON 1/6/2025] warfarin  5 mg oral Every Monday     PRN medications   Medication    acetaminophen    Or    acetaminophen    Or    acetaminophen    albuterol    benzonatate    dextrose    dextrose    dextrose    dextrose     glucagon    glucagon    glucagon    glucagon    ondansetron    Or    ondansetron     Continuous Medications   Medication Dose Last Rate     Outpatient Medications:  Current Outpatient Medications   Medication Instructions    albuterol 90 mcg/actuation inhaler 2 puffs, inhalation, Every 6 hours PRN    albuterol 0.63 mg, nebulization, 3 times daily RT    amLODIPine (NORVASC) 5 mg, oral, Daily    aspirin 81 mg EC tablet Take by mouth.    atorvastatin (LIPITOR) 20 mg, oral, Nightly    benzonatate (Tessalon) 100 mg capsule Take by mouth.    blood sugar diagnostic (OneTouch Ultra Test) strip USE 1 STRIP TO CHECK GLUCOSE ONCE DAILY    cholecalciferol (VITAMIN D-3) 2,000 Units, Daily    furosemide (LASIX) 40 mg, oral, Daily PRN    gabapentin (Neurontin) 100 mg capsule Take by mouth.    glimepiride (AMARYL) 2 mg, oral, Daily, as directed    lancets (OneTouch Delica Plus Lancet) 33 gauge misc USE 1 TO CHECK GLUCOSE ONCE DAILY    levothyroxine (SYNTHROID, LEVOXYL) 75 mcg, oral, Daily, as directed    metoprolol succinate XL (Toprol-XL) 25 mg 24 hr tablet 1 tablet, Daily    oxygen (O2) gas therapy Continuous    warfarin (Coumadin) 5 mg tablet Take 5 mg (1 tablet) every Monday and 2.5 mg (1/2 tablet) all other days or as directed by the anticoagulation clinic.       Physical Exam:  GEN: Obese  73 yo wf lying 30 degrees  HEENT: Atraumatic, normocephalic  COR: Irreg, irreg  LUNGS: Few rhonchi  EXT: Warm     Assessment/Plan   1.) Acute influenza now with pneumonia possible mild HFpEF  2.) Mildly elevated Troponin from demand ischemia  3.) Afib rate controlled  REC:  1.) Continuc same meds  2.) Echocardiogram  3.) Continue tele monitor       Thank you for the consultation                          Will follow with you                                                DORI           Peripheral IV 12/31/24 22 G Distal;Left;Ventral Forearm (Active)   Site Assessment Clean;Dry;Intact 01/02/25 0847   Dressing Status Clean;Dry 01/02/25 0847    Number of days: 2       Code Status:  Full Code    I spent 30 minutes in the professional and overall care of this patient.        Lul Day MD

## 2025-01-02 NOTE — PROGRESS NOTES
"Genet Wilks is a 74 y.o. female on day 2 of admission presenting with Influenza A.    Subjective   Still wheezing and requring oxygen, feeling better       Objective     Physical Exam  Constitutional:       Appearance: Normal appearance.   HENT:      Head: Normocephalic and atraumatic.      Right Ear: Tympanic membrane and ear canal normal.      Left Ear: Tympanic membrane and ear canal normal.      Mouth/Throat:      Mouth: Mucous membranes are moist.      Pharynx: Oropharynx is clear.   Eyes:      Extraocular Movements: Extraocular movements intact.      Conjunctiva/sclera: Conjunctivae normal.      Pupils: Pupils are equal, round, and reactive to light.   Cardiovascular:      Rate and Rhythm: Normal rate and regular rhythm.      Pulses: Normal pulses.      Heart sounds: Normal heart sounds.   Pulmonary:      Effort: Pulmonary effort is normal.      Breath sounds: Normal breath sounds.   Abdominal:      General: Abdomen is flat. Bowel sounds are normal.      Palpations: Abdomen is soft.   Musculoskeletal:         General: Normal range of motion.      Cervical back: Normal range of motion and neck supple.   Skin:     General: Skin is warm and dry.      Capillary Refill: Capillary refill takes 2 to 3 seconds.   Neurological:      General: No focal deficit present.      Mental Status: She is alert and oriented to person, place, and time. Mental status is at baseline.   Psychiatric:         Mood and Affect: Mood normal.         Behavior: Behavior normal.         Thought Content: Thought content normal.         Judgment: Judgment normal.         Last Recorded Vitals  Blood pressure 123/56, pulse 76, temperature 36 °C (96.8 °F), resp. rate 17, height 1.575 m (5' 2\"), weight 95.3 kg (210 lb), SpO2 (!) 87%.  Intake/Output last 3 Shifts:  I/O last 3 completed shifts:  In: 225 (2.4 mL/kg) [I.V.:225 (2.4 mL/kg)]  Out: - (0 mL/kg)   Weight: 95.3 kg     Relevant Results                              Assessment/Plan "   Assessment & Plan  Influenza A    74-year-old female with past medical history of CHF on 2L nasal cannula as needed presented to ED for flulike symptoms found to be flu a positive.  Patient treated with Solu-Medrol, oxygen, breathing treatments in the ED and admitted to inpatient with telemetry under Dr. Hansen for further evaluation and care.     #Influenza A  #COPD exacerbation  #Hypomagnesemia  #A-fib with RVR  #Lactatemia  - Treated with oxygen, breathing treatments, Solu-Medrol and magnesium replaced in ED  - Continue above treatments  - IV fluids, trend lactate  - Vitals every 4 hours, telemetry  - Droplet precautions  - PT/OT/social work  -Continue home Coumadin, SCD for DVT PPx  - Head of bed 30 degrees, aspiration precautions     Chronic conditions  #Mitral valve replacement on Coumadin  #HTN  #HLD  #DM  #CHF on 2L nasal cannula as needed  - Continue home meds  - Cardiac diet    1/2: doing ok, montior labs, wean oxygen. Overall doing well, dc in next 24-48 hrs    I spent 55 minutes in the professional and overall care of this patient.      Rod Hansen, DO

## 2025-01-02 NOTE — CARE PLAN
The patient's goals for the shift include      The clinical goals for the shift include pt to remain HDS during this shift    Over the shift, the patient did not make progress toward the following goals. Barriers to progression include n/a. Recommendations to address these barriers include n/a.

## 2025-01-03 ENCOUNTER — APPOINTMENT (OUTPATIENT)
Dept: CARDIOLOGY | Facility: HOSPITAL | Age: 75
End: 2025-01-03
Payer: MEDICARE

## 2025-01-03 ENCOUNTER — APPOINTMENT (OUTPATIENT)
Dept: RADIOLOGY | Facility: HOSPITAL | Age: 75
End: 2025-01-03
Payer: MEDICARE

## 2025-01-03 LAB
ALBUMIN SERPL BCP-MCNC: 3.6 G/DL (ref 3.4–5)
ALP SERPL-CCNC: 76 U/L (ref 33–136)
ALT SERPL W P-5'-P-CCNC: 38 U/L (ref 7–45)
ANION GAP SERPL CALC-SCNC: 12 MMOL/L (ref 10–20)
ANION GAP SERPL CALC-SCNC: 15 MMOL/L (ref 10–20)
AST SERPL W P-5'-P-CCNC: 30 U/L (ref 9–39)
BASOPHILS # BLD AUTO: 0.01 X10*3/UL (ref 0–0.1)
BASOPHILS NFR BLD AUTO: 0.1 %
BILIRUB SERPL-MCNC: 1.4 MG/DL (ref 0–1.2)
BUN SERPL-MCNC: 32 MG/DL (ref 6–23)
BUN SERPL-MCNC: 33 MG/DL (ref 6–23)
CALCIUM SERPL-MCNC: 8.5 MG/DL (ref 8.6–10.3)
CALCIUM SERPL-MCNC: 9 MG/DL (ref 8.6–10.3)
CHLORIDE SERPL-SCNC: 98 MMOL/L (ref 98–107)
CHLORIDE SERPL-SCNC: 98 MMOL/L (ref 98–107)
CO2 SERPL-SCNC: 27 MMOL/L (ref 21–32)
CO2 SERPL-SCNC: 32 MMOL/L (ref 21–32)
CREAT SERPL-MCNC: 0.9 MG/DL (ref 0.5–1.05)
CREAT SERPL-MCNC: 0.98 MG/DL (ref 0.5–1.05)
EGFRCR SERPLBLD CKD-EPI 2021: 61 ML/MIN/1.73M*2
EGFRCR SERPLBLD CKD-EPI 2021: 68 ML/MIN/1.73M*2
EOSINOPHIL # BLD AUTO: 0 X10*3/UL (ref 0–0.4)
EOSINOPHIL NFR BLD AUTO: 0 %
ERYTHROCYTE [DISTWIDTH] IN BLOOD BY AUTOMATED COUNT: 15.9 % (ref 11.5–14.5)
ERYTHROCYTE [DISTWIDTH] IN BLOOD BY AUTOMATED COUNT: 15.9 % (ref 11.5–14.5)
GLUCOSE BLD MANUAL STRIP-MCNC: 118 MG/DL (ref 74–99)
GLUCOSE BLD MANUAL STRIP-MCNC: 245 MG/DL (ref 74–99)
GLUCOSE BLD MANUAL STRIP-MCNC: 298 MG/DL (ref 74–99)
GLUCOSE BLD MANUAL STRIP-MCNC: 317 MG/DL (ref 74–99)
GLUCOSE BLD MANUAL STRIP-MCNC: 337 MG/DL (ref 74–99)
GLUCOSE SERPL-MCNC: 323 MG/DL (ref 74–99)
GLUCOSE SERPL-MCNC: 339 MG/DL (ref 74–99)
HCT VFR BLD AUTO: 34.7 % (ref 36–46)
HCT VFR BLD AUTO: 37.1 % (ref 36–46)
HGB BLD-MCNC: 10.7 G/DL (ref 12–16)
HGB BLD-MCNC: 11.8 G/DL (ref 12–16)
HOLD SPECIMEN: NORMAL
IMM GRANULOCYTES # BLD AUTO: 0.07 X10*3/UL (ref 0–0.5)
IMM GRANULOCYTES NFR BLD AUTO: 0.7 % (ref 0–0.9)
INR PPP: 1.7 (ref 0.9–1.1)
LACTATE SERPL-SCNC: 1.1 MMOL/L (ref 0.4–2)
LYMPHOCYTES # BLD AUTO: 0.38 X10*3/UL (ref 0.8–3)
LYMPHOCYTES NFR BLD AUTO: 3.6 %
MCH RBC QN AUTO: 27.4 PG (ref 26–34)
MCH RBC QN AUTO: 28.1 PG (ref 26–34)
MCHC RBC AUTO-ENTMCNC: 30.8 G/DL (ref 32–36)
MCHC RBC AUTO-ENTMCNC: 31.8 G/DL (ref 32–36)
MCV RBC AUTO: 88 FL (ref 80–100)
MCV RBC AUTO: 89 FL (ref 80–100)
MONOCYTES # BLD AUTO: 0.39 X10*3/UL (ref 0.05–0.8)
MONOCYTES NFR BLD AUTO: 3.7 %
NEUTROPHILS # BLD AUTO: 9.75 X10*3/UL (ref 1.6–5.5)
NEUTROPHILS NFR BLD AUTO: 91.9 %
NRBC BLD-RTO: 0 /100 WBCS (ref 0–0)
NRBC BLD-RTO: 0 /100 WBCS (ref 0–0)
PLATELET # BLD AUTO: 204 X10*3/UL (ref 150–450)
PLATELET # BLD AUTO: 205 X10*3/UL (ref 150–450)
POTASSIUM SERPL-SCNC: 4 MMOL/L (ref 3.5–5.3)
POTASSIUM SERPL-SCNC: 4.1 MMOL/L (ref 3.5–5.3)
PROT SERPL-MCNC: 6.1 G/DL (ref 6.4–8.2)
PROTHROMBIN TIME: 19.3 SECONDS (ref 9.8–12.8)
RBC # BLD AUTO: 3.9 X10*6/UL (ref 4–5.2)
RBC # BLD AUTO: 4.2 X10*6/UL (ref 4–5.2)
SODIUM SERPL-SCNC: 136 MMOL/L (ref 136–145)
SODIUM SERPL-SCNC: 138 MMOL/L (ref 136–145)
WBC # BLD AUTO: 10.6 X10*3/UL (ref 4.4–11.3)
WBC # BLD AUTO: 11.7 X10*3/UL (ref 4.4–11.3)

## 2025-01-03 PROCEDURE — 2500000002 HC RX 250 W HCPCS SELF ADMINISTERED DRUGS (ALT 637 FOR MEDICARE OP, ALT 636 FOR OP/ED)

## 2025-01-03 PROCEDURE — 36415 COLL VENOUS BLD VENIPUNCTURE: CPT

## 2025-01-03 PROCEDURE — 80048 BASIC METABOLIC PNL TOTAL CA: CPT | Mod: CCI | Performed by: INTERNAL MEDICINE

## 2025-01-03 PROCEDURE — 71045 X-RAY EXAM CHEST 1 VIEW: CPT

## 2025-01-03 PROCEDURE — 1200000002 HC GENERAL ROOM WITH TELEMETRY DAILY

## 2025-01-03 PROCEDURE — 93005 ELECTROCARDIOGRAM TRACING: CPT

## 2025-01-03 PROCEDURE — 85027 COMPLETE CBC AUTOMATED: CPT

## 2025-01-03 PROCEDURE — 36415 COLL VENOUS BLD VENIPUNCTURE: CPT | Performed by: INTERNAL MEDICINE

## 2025-01-03 PROCEDURE — 82947 ASSAY GLUCOSE BLOOD QUANT: CPT

## 2025-01-03 PROCEDURE — 85025 COMPLETE CBC W/AUTO DIFF WBC: CPT | Performed by: INTERNAL MEDICINE

## 2025-01-03 PROCEDURE — 2500000004 HC RX 250 GENERAL PHARMACY W/ HCPCS (ALT 636 FOR OP/ED)

## 2025-01-03 PROCEDURE — 82374 ASSAY BLOOD CARBON DIOXIDE: CPT

## 2025-01-03 PROCEDURE — 94640 AIRWAY INHALATION TREATMENT: CPT

## 2025-01-03 PROCEDURE — 2500000001 HC RX 250 WO HCPCS SELF ADMINISTERED DRUGS (ALT 637 FOR MEDICARE OP)

## 2025-01-03 PROCEDURE — 99233 SBSQ HOSP IP/OBS HIGH 50: CPT | Performed by: STUDENT IN AN ORGANIZED HEALTH CARE EDUCATION/TRAINING PROGRAM

## 2025-01-03 PROCEDURE — 83605 ASSAY OF LACTIC ACID: CPT

## 2025-01-03 PROCEDURE — 2500000002 HC RX 250 W HCPCS SELF ADMINISTERED DRUGS (ALT 637 FOR MEDICARE OP, ALT 636 FOR OP/ED): Performed by: EMERGENCY MEDICINE

## 2025-01-03 PROCEDURE — 9420000001 HC RT PATIENT EDUCATION 5 MIN

## 2025-01-03 PROCEDURE — 85610 PROTHROMBIN TIME: CPT

## 2025-01-03 PROCEDURE — 2500000005 HC RX 250 GENERAL PHARMACY W/O HCPCS: Performed by: EMERGENCY MEDICINE

## 2025-01-03 PROCEDURE — 93010 ELECTROCARDIOGRAM REPORT: CPT | Performed by: INTERNAL MEDICINE

## 2025-01-03 PROCEDURE — 2500000002 HC RX 250 W HCPCS SELF ADMINISTERED DRUGS (ALT 637 FOR MEDICARE OP, ALT 636 FOR OP/ED): Performed by: STUDENT IN AN ORGANIZED HEALTH CARE EDUCATION/TRAINING PROGRAM

## 2025-01-03 PROCEDURE — 2500000001 HC RX 250 WO HCPCS SELF ADMINISTERED DRUGS (ALT 637 FOR MEDICARE OP): Performed by: STUDENT IN AN ORGANIZED HEALTH CARE EDUCATION/TRAINING PROGRAM

## 2025-01-03 PROCEDURE — 71045 X-RAY EXAM CHEST 1 VIEW: CPT | Performed by: STUDENT IN AN ORGANIZED HEALTH CARE EDUCATION/TRAINING PROGRAM

## 2025-01-03 PROCEDURE — 99233 SBSQ HOSP IP/OBS HIGH 50: CPT | Performed by: INTERNAL MEDICINE

## 2025-01-03 RX ORDER — LABETALOL HYDROCHLORIDE 5 MG/ML
20 INJECTION, SOLUTION INTRAVENOUS ONCE
Status: COMPLETED | OUTPATIENT
Start: 2025-01-03 | End: 2025-01-03

## 2025-01-03 RX ORDER — GUAIFENESIN 600 MG/1
600 TABLET, EXTENDED RELEASE ORAL 2 TIMES DAILY
Status: DISCONTINUED | OUTPATIENT
Start: 2025-01-03 | End: 2025-01-04

## 2025-01-03 RX ORDER — FUROSEMIDE 10 MG/ML
20 INJECTION INTRAMUSCULAR; INTRAVENOUS ONCE
Status: COMPLETED | OUTPATIENT
Start: 2025-01-03 | End: 2025-01-03

## 2025-01-03 RX ORDER — METOPROLOL SUCCINATE 25 MG/1
25 TABLET, EXTENDED RELEASE ORAL DAILY
Status: DISCONTINUED | OUTPATIENT
Start: 2025-01-03 | End: 2025-01-08 | Stop reason: HOSPADM

## 2025-01-03 RX ADMIN — GUAIFENESIN 600 MG: 600 TABLET, EXTENDED RELEASE ORAL at 02:05

## 2025-01-03 RX ADMIN — FUROSEMIDE 20 MG: 10 INJECTION, SOLUTION INTRAMUSCULAR; INTRAVENOUS at 02:05

## 2025-01-03 RX ADMIN — METHYLPREDNISOLONE SODIUM SUCCINATE 40 MG: 40 INJECTION, POWDER, FOR SOLUTION INTRAMUSCULAR; INTRAVENOUS at 13:28

## 2025-01-03 RX ADMIN — INSULIN LISPRO 10 UNITS: 100 INJECTION, SOLUTION INTRAVENOUS; SUBCUTANEOUS at 08:44

## 2025-01-03 RX ADMIN — METHYLPREDNISOLONE SODIUM SUCCINATE 40 MG: 40 INJECTION, POWDER, FOR SOLUTION INTRAMUSCULAR; INTRAVENOUS at 06:46

## 2025-01-03 RX ADMIN — ASPIRIN 81 MG: 81 TABLET, COATED ORAL at 08:43

## 2025-01-03 RX ADMIN — AMLODIPINE BESYLATE 5 MG: 5 TABLET ORAL at 08:43

## 2025-01-03 RX ADMIN — INSULIN GLARGINE 15 UNITS: 100 INJECTION, SOLUTION SUBCUTANEOUS at 20:44

## 2025-01-03 RX ADMIN — INSULIN LISPRO 10 UNITS: 100 INJECTION, SOLUTION INTRAVENOUS; SUBCUTANEOUS at 12:29

## 2025-01-03 RX ADMIN — IPRATROPIUM BROMIDE AND ALBUTEROL SULFATE 3 ML: .5; 3 SOLUTION RESPIRATORY (INHALATION) at 06:46

## 2025-01-03 RX ADMIN — METHYLPREDNISOLONE SODIUM SUCCINATE 40 MG: 40 INJECTION, POWDER, FOR SOLUTION INTRAMUSCULAR; INTRAVENOUS at 21:18

## 2025-01-03 RX ADMIN — IPRATROPIUM BROMIDE AND ALBUTEROL SULFATE 3 ML: .5; 3 SOLUTION RESPIRATORY (INHALATION) at 11:44

## 2025-01-03 RX ADMIN — GUAIFENESIN 600 MG: 600 TABLET, EXTENDED RELEASE ORAL at 20:45

## 2025-01-03 RX ADMIN — INSULIN LISPRO 8 UNITS: 100 INJECTION, SOLUTION INTRAVENOUS; SUBCUTANEOUS at 08:45

## 2025-01-03 RX ADMIN — INSULIN LISPRO 8 UNITS: 100 INJECTION, SOLUTION INTRAVENOUS; SUBCUTANEOUS at 12:29

## 2025-01-03 RX ADMIN — FUROSEMIDE 40 MG: 40 TABLET ORAL at 08:43

## 2025-01-03 RX ADMIN — WARFARIN SODIUM 2.5 MG: 2.5 TABLET ORAL at 17:12

## 2025-01-03 RX ADMIN — METOPROLOL SUCCINATE 25 MG: 25 TABLET, EXTENDED RELEASE ORAL at 02:05

## 2025-01-03 RX ADMIN — PSYLLIUM HUSK 1 PACKET: 3.4 POWDER ORAL at 08:43

## 2025-01-03 RX ADMIN — IPRATROPIUM BROMIDE AND ALBUTEROL SULFATE 3 ML: .5; 3 SOLUTION RESPIRATORY (INHALATION) at 16:17

## 2025-01-03 RX ADMIN — LABETALOL HYDROCHLORIDE 20 MG: 5 INJECTION, SOLUTION INTRAVENOUS at 02:05

## 2025-01-03 RX ADMIN — GUAIFENESIN 600 MG: 600 TABLET, EXTENDED RELEASE ORAL at 08:43

## 2025-01-03 RX ADMIN — Medication 28 PERCENT: at 06:47

## 2025-01-03 RX ADMIN — ALBUTEROL SULFATE 2.5 MG: 2.5 SOLUTION RESPIRATORY (INHALATION) at 01:49

## 2025-01-03 RX ADMIN — INSULIN LISPRO 10 UNITS: 100 INJECTION, SOLUTION INTRAVENOUS; SUBCUTANEOUS at 16:43

## 2025-01-03 RX ADMIN — LEVOTHYROXINE SODIUM 75 MCG: 0.07 TABLET ORAL at 06:46

## 2025-01-03 ASSESSMENT — COGNITIVE AND FUNCTIONAL STATUS - GENERAL
DAILY ACTIVITIY SCORE: 24
DAILY ACTIVITIY SCORE: 24
MOVING TO AND FROM BED TO CHAIR: A LITTLE
WALKING IN HOSPITAL ROOM: A LITTLE
CLIMB 3 TO 5 STEPS WITH RAILING: A LITTLE
CLIMB 3 TO 5 STEPS WITH RAILING: A LITTLE
WALKING IN HOSPITAL ROOM: A LITTLE
STANDING UP FROM CHAIR USING ARMS: A LITTLE
MOBILITY SCORE: 20
MOBILITY SCORE: 22

## 2025-01-03 ASSESSMENT — PAIN SCALES - GENERAL
PAINLEVEL_OUTOF10: 0 - NO PAIN
PAINLEVEL_OUTOF10: 0 - NO PAIN

## 2025-01-03 NOTE — NURSING NOTE
Pulmonary Disease Navigator Documentation:    Pulmonary disease education was performed by the Respiratory Therapist with a good understanding: yes  Home oxygen: 2 lpm nc HS (Patient unsure of DME at this time)  Dyspnea triggers discussed and when to notify physician? yes  Home medication usage/spacer education done? Yes, Albuterol MDI/Neb.  Pursed lip breathing education done? yes  No PFTs on file.    Comments: Patient stated she has a history of asthma and follows with her PCP for any outpatient pulmonary needs.  Patient stated that prior to this illness she had not been doing well on her current home regimen.  Patient stated she only has stationary oxygen concentrator at home and has had it since 2021.  Patient inquired on getting updated home oxygen equipment.  Patient to have her son get her current DME name and contact information from home equipment and patient will then provide information to transitional care team for follow-up.  Patient may require new testing prior to discharge.  SW and TCC notified.  Will continue to follow.

## 2025-01-03 NOTE — PROGRESS NOTES
Subjective Data:  Feels better    Overnight Events:    None reported     Objective Data:  Last Recorded Vitals:  Vitals:    01/03/25 0318 01/03/25 0507 01/03/25 0729 01/03/25 1127   BP: 148/67 152/82 126/59 137/63   Pulse: 77 78 71 75   Resp:       Temp:   35.9 °C (96.6 °F) 35.8 °C (96.4 °F)   TempSrc:       SpO2: 91% 96% 95% 92%   Weight:       Height:           Last Labs:  CBC - 1/3/2025:  6:31 AM  10.6 10.7 205    34.7      CMP - 1/3/2025:  6:31 AM  8.5 6.1 30 --- 1.4   _ 3.6 38 76      PTT - No results in last year.  1.7   19.3 _     TROPHS   Date/Time Value Ref Range Status   12/31/2024 01:36 PM 27 0 - 13 ng/L Final   12/31/2024 11:41 AM 23 0 - 13 ng/L Final   12/13/2024 04:40 PM 14 0 - 13 ng/L Final     BNP   Date/Time Value Ref Range Status   12/31/2024 11:41  0 - 99 pg/mL Final   12/13/2024 02:27  0 - 99 pg/mL Final     HGBA1C   Date/Time Value Ref Range Status   09/09/2024 04:20 PM 7.6 4.2 - 6.5 % Final   11/09/2023 04:14 PM 6.9 4.2 - 6.5 % Final   02/02/2023 02:04 PM 6.2 4.2 - 6.5 % Final   05/04/2022 03:50 PM 6.1 4.2 - 6.5 % Final     LDLCALC   Date/Time Value Ref Range Status   09/09/2024 04:30 PM 98 <=99 mg/dL Final     Comment:                                 Near   Borderline      AGE      Desirable  Optimal    High     High     Very High     0-19 Y     0 - 109     ---    110-129   >/= 130     ----    20-24 Y     0 - 119     ---    120-159   >/= 160     ----      >24 Y     0 -  99   100-129  130-159   160-189     >/=190     11/09/2023 03:26 PM 85 <=99 mg/dL Final     Comment:                                 Near   Borderline      AGE      Desirable  Optimal    High     High     Very High     0-19 Y     0 - 109     ---    110-129   >/= 130     ----    20-24 Y     0 - 119     ---    120-159   >/= 160     ----      >24 Y     0 -  99   100-129  130-159   160-189     >/=190       VLDL   Date/Time Value Ref Range Status   09/09/2024 04:30 PM 28 0 - 40 mg/dL Final   11/09/2023 03:26 PM 30 0 - 40  mg/dL Final   05/11/2023 01:57 PM 31 0 - 40 mg/dL Final   02/02/2023 07:44 AM 28 0 - 40 mg/dL Final   05/04/2022 12:00 AM 22 0 - 40 mg/dL Final      Last I/O:  No intake/output data recorded.    Past Cardiology Tests (Last 3 Years):  EKG:  ECG 12 lead 12/31/2024 (Preliminary)      ECG 12 lead (Ancillary Performed) 12/17/2024      ECG 12 Lead 09/05/2024      ECG 12 Lead 03/20/2024      ECG 12 Lead 12/11/2023    Echo:  Transthoracic Echo (TTE) Complete 03/20/2024    Ejection Fractions:  EF   Date/Time Value Ref Range Status   03/20/2024 02:02 PM 63 %      Cath:  No results found for this or any previous visit from the past 1095 days.    Stress Test:  No results found for this or any previous visit from the past 1095 days.    Cardiac Imaging:  No results found for this or any previous visit from the past 1095 days.      Inpatient Medications:  Scheduled medications   Medication Dose Route Frequency    amLODIPine  5 mg oral Daily    aspirin  81 mg oral Daily    furosemide  40 mg oral Daily    guaiFENesin  600 mg oral BID    insulin glargine  15 Units subcutaneous q24h    insulin lispro  0-10 Units subcutaneous TID AC    insulin lispro  10 Units subcutaneous TID AC    ipratropium-albuteroL  3 mL nebulization TID    levothyroxine  75 mcg oral Daily    methylPREDNISolone sodium succinate (PF)  40 mg intravenous q8h KIMBER    metoprolol succinate XL  25 mg oral Daily    oxygen   inhalation Continuous - Inhalation    psyllium  1 packet oral Daily    warfarin  2.5 mg oral Once per day on Sunday Tuesday Wednesday Thursday Friday Saturday    [START ON 1/6/2025] warfarin  5 mg oral Every Monday     PRN medications   Medication    acetaminophen    Or    acetaminophen    Or    acetaminophen    albuterol    benzonatate    dextrose    dextrose    dextrose    dextrose    glucagon    glucagon    glucagon    glucagon    ondansetron    Or    ondansetron     Continuous Medications   Medication Dose Last Rate       Physical Exam:  GEN Obese  73 yo wf lying flat comfortably  HEENT: Atraumatic  COR: Irreg irreg  LUNGS: Clearer     Assessment/Plan   1.) Improving influenza  2.) Mo;ld HFpEf  Await cehocardiogram'  Continuc tele  Peripheral IV 12/31/24 22 G Distal;Left;Ventral Forearm (Active)   Site Assessment Clean;Dry;Intact 01/03/25 0900   Dressing Status Dry;Clean 01/03/25 0900   Number of days: 3       Code Status:  Full Code    I spent 30 minutes in the professional and overall care of this patient.        Lul Day MD

## 2025-01-03 NOTE — PROGRESS NOTES
"Genet Wilks is a 74 y.o. female on day 3 of admission presenting with Influenza A.    Subjective   Still wheezing and requring oxygen, feeling better, rrt overnight for hibhg and sob, was given lasix and doing better       Objective     Physical Exam  Constitutional:       Appearance: Normal appearance.   HENT:      Head: Normocephalic and atraumatic.      Right Ear: Tympanic membrane and ear canal normal.      Left Ear: Tympanic membrane and ear canal normal.      Mouth/Throat:      Mouth: Mucous membranes are moist.      Pharynx: Oropharynx is clear.   Eyes:      Extraocular Movements: Extraocular movements intact.      Conjunctiva/sclera: Conjunctivae normal.      Pupils: Pupils are equal, round, and reactive to light.   Cardiovascular:      Rate and Rhythm: Normal rate and regular rhythm.      Pulses: Normal pulses.      Heart sounds: Normal heart sounds.   Pulmonary:      Effort: Pulmonary effort is normal.      Breath sounds: Normal breath sounds.   Abdominal:      General: Abdomen is flat. Bowel sounds are normal.      Palpations: Abdomen is soft.   Musculoskeletal:         General: Normal range of motion.      Cervical back: Normal range of motion and neck supple.   Skin:     General: Skin is warm and dry.      Capillary Refill: Capillary refill takes 2 to 3 seconds.   Neurological:      General: No focal deficit present.      Mental Status: She is alert and oriented to person, place, and time. Mental status is at baseline.   Psychiatric:         Mood and Affect: Mood normal.         Behavior: Behavior normal.         Thought Content: Thought content normal.         Judgment: Judgment normal.         Last Recorded Vitals  Blood pressure 126/59, pulse 71, temperature 35.9 °C (96.6 °F), resp. rate 18, height 1.575 m (5' 2\"), weight 95.3 kg (210 lb), SpO2 95%.  Intake/Output last 3 Shifts:  No intake/output data recorded.    Relevant Results                              Assessment/Plan   Assessment & " Plan  Influenza A    74-year-old female with past medical history of CHF on 2L nasal cannula as needed presented to ED for flulike symptoms found to be flu a positive.  Patient treated with Solu-Medrol, oxygen, breathing treatments in the ED and admitted to inpatient with telemetry under Dr. Hansen for further evaluation and care.     #Influenza A  #COPD exacerbation  #Hypomagnesemia  #A-fib with RVR  #Lactatemia  - Treated with oxygen, breathing treatments, Solu-Medrol and magnesium replaced in ED  - Continue above treatments  - IV fluids, trend lactate  - Vitals every 4 hours, telemetry  - Droplet precautions  - PT/OT/social work  -Continue home Coumadin, SCD for DVT PPx  - Head of bed 30 degrees, aspiration precautions     Chronic conditions  #Mitral valve replacement on Coumadin  #HTN  #HLD  #DM  #CHF on 2L nasal cannula as needed  - Continue home meds  - Cardiac diet    1/2: doing ok, montior labs, wean oxygen. Overall doing well, dc in next 24-48 hrs    1/3: continue breathing treatments, lasix one time, steroids continued, insulin added to help with sugars. Will monitr    I spent 55 minutes in the professional and overall care of this patient.      Rod Hansen, DO

## 2025-01-03 NOTE — NURSING NOTE
"At 0145 pt voiced c/o \"having a hard time breathing\" this nurse went to assess this patient. Oxygen sats were 94% on 2L O2 which were her baseline oxygen demands. Contacted RT for prn breathing tx.  Upon retrieving vs pt also stated she was having chest tightness but not \"pain\". however later compared it to feeling congested and unable to cough up phlegm. EKG obtained, results can be found in [facility]. 0151 Rapid Response called dt chest tightness, SOB, and change in VS.  Glucose obtained with reading of 398.  Crash cart outside of room, [facility] ernesto at bedside and pads applied.  at bedside along with rapid response team. 0215 Rcvd n.o. for 20mg labetalol IVP and 20mg of lasix IVP. Medications admininistered while pt vs cycled on dynamap. Also received n.o. for 2v cxr, toprolol  and mucinex po. Cbc/bmp and lactate drawn by phlebotomyh. Both doses administered. 2V cxr completed while MD at bedside to review results. Pt symptoms dissipated and reported feeling better. BP and HR returning to baseline. Pt appreciative for quick responsiveness and care from team.    "

## 2025-01-03 NOTE — SIGNIFICANT EVENT
"  Genet Wilks is a 74 y.o. female on day 3 of admission presenting with Influenza A.      Assessment / Plan        Rapid response called for patient in bed 622 A at 1:51 AM for hypertension with systolic's reaching 200. Patient admitted for flu A infection. Patient was in A fib with rates controlled around 90 bpm. Patient had no new oxygen requirements.     #Influenza A infection  #Hypertensive urgency  #COPD exacerbation   #A fib  #Pulmonary edema  -CXR shows cardiomegaly and significant pulmonary interstitial edema that is increased from prior CXR done earlier today.   -Patient significantly hypertensive with systolic's reaching the 200s.  Plan:  -20 IV Labetalol  -20 IV lasix. Consider further lasix administration by primary team  -Continue oral 40 lasix daily  -Initiated metoprolol succinate 25 mg daily  -Initiated mucinex  -Echo pending  -CBC, BMP, lactate ordered  -Monitor intake/output  -continue Solu-Medrol   -supplemental oxygen as needed       Tim Lovett DO   PGY-1, Internal Medicine  This is a preliminary note, please await attending attestation for final A/P    Subjective     Patient seen and examined. Patient denied chest pain at time of examination. She admits to slight shortness of breath but was not in any significant distress.       Objective       Physical Exam:  General:  Pleasant and cooperative. No apparent distress.  HEENT:  Normocephalic, atraumatic  Chest:  Scattered rales and rhonchi throughout.  CV:  Irregularly irregular rate and rhythm. No murmurs    Abdomen: Abdomen is soft, non-tender, non-distended. BS +   Extremities:  BL lower extremity edema .   Neurological:  AAOx3. No focal deficits.  Skin:  Warm and dry.    Last Recorded Vitals  Blood pressure (!) 181/80, pulse 87, temperature 36.6 °C (97.9 °F), resp. rate 18, height 1.575 m (5' 2\"), weight 95.3 kg (210 lb), SpO2 94%.  Intake/Output last 3 Shifts:  I/O last 3 completed shifts:  In: 225 (2.4 mL/kg) [I.V.:225 (2.4 " mL/kg)]  Out: - (0 mL/kg)   Weight: 95.3 kg     Last CBC & BMP  Lab Results   Component Value Date    GLUCOSE 382 (H) 01/02/2025    CALCIUM 8.9 01/02/2025     01/02/2025    K 4.5 01/02/2025    CO2 31 01/02/2025    CL 99 01/02/2025    BUN 32 (H) 01/02/2025    CREATININE 1.08 (H) 01/02/2025     Lab Results   Component Value Date    WBC 14.1 (H) 01/02/2025    HGB 11.0 (L) 01/02/2025    HCT 36.0 01/02/2025    MCV 90 01/02/2025     01/02/2025

## 2025-01-03 NOTE — CARE PLAN
The patient's goals for the shift include      The clinical goals for the shift include pt to remain HDS    Over the shift, the patient did not make progress toward the following goals. Barriers to progression include N/A. Recommendations to address these barriers include N/A.

## 2025-01-03 NOTE — PROGRESS NOTES
"   25 1631   Discharge Planning   Living Arrangements Children  (Patient noted that she lives with her son, Boris, who lives upstairs.)   Support Systems Children  (patient reported that she has 2 sons who are her emergency contacts, Boris and Paulo.)   Type of Residence Private residence   Number of Stairs to Enter Residence 3   Number of Stairs Within Residence 0   Do you have animals or pets at home? No  (Patient reported that her son who lives upstairs has a dog.)   Expected Discharge Disposition Home   Does the patient need discharge transport arranged? No  (patient stated that her son or dtr-in-law will transport her.)     This MSW met with patient at bedside who provided her name and  for patient identifiers. She also provided other demographics and appeared alert and oriented x 3. Patient stated that she lives with her son, Boris, who lives upstairs. Patient reported that she is independent with all ADLS and IADLs, has no assistive devices at home but does have a nebulizer which she needs a new mask for and a concentrator received in  which is \"loud\" . She noted she uses 2 L at night and wondered if she could get a new one. Patient stated that she gets prescriptions through [facility] and stated that [facility] , she can [facility] her medications. Patient reported that her PCP is Dr. Mitesh Simms. She stated that her son or dtr-in-law will transport her home which is her preferred discharge plan. She denied need for HHC upon discharge. No SW needs noted by patient. Patient stated that the food at  has been poor. [facility] updated with the outcome of this assessment. This MSW also updated RT Gus, with patient's need/request for new nebulizer mask /concentrator.   16:48: RT stated that patient's son to find out the provider of O2 concentrator and nebulizer and let  staff know then referral can be follow up on to potentially get new DME. Follow up note left for " [facility].

## 2025-01-04 LAB
ALBUMIN SERPL BCP-MCNC: 3.7 G/DL (ref 3.4–5)
ALP SERPL-CCNC: 82 U/L (ref 33–136)
ALT SERPL W P-5'-P-CCNC: 36 U/L (ref 7–45)
ANION GAP SERPL CALC-SCNC: 11 MMOL/L (ref 10–20)
AST SERPL W P-5'-P-CCNC: 20 U/L (ref 9–39)
BACTERIA BLD CULT: NORMAL
BACTERIA BLD CULT: NORMAL
BASOPHILS # BLD AUTO: 0.01 X10*3/UL (ref 0–0.1)
BASOPHILS NFR BLD AUTO: 0.1 %
BILIRUB SERPL-MCNC: 2.1 MG/DL (ref 0–1.2)
BUN SERPL-MCNC: 29 MG/DL (ref 6–23)
CALCIUM SERPL-MCNC: 8.7 MG/DL (ref 8.6–10.3)
CHLORIDE SERPL-SCNC: 98 MMOL/L (ref 98–107)
CO2 SERPL-SCNC: 36 MMOL/L (ref 21–32)
CREAT SERPL-MCNC: 0.81 MG/DL (ref 0.5–1.05)
EGFRCR SERPLBLD CKD-EPI 2021: 77 ML/MIN/1.73M*2
EOSINOPHIL # BLD AUTO: 0 X10*3/UL (ref 0–0.4)
EOSINOPHIL NFR BLD AUTO: 0 %
ERYTHROCYTE [DISTWIDTH] IN BLOOD BY AUTOMATED COUNT: 15.9 % (ref 11.5–14.5)
GLUCOSE BLD MANUAL STRIP-MCNC: 247 MG/DL (ref 74–99)
GLUCOSE BLD MANUAL STRIP-MCNC: 266 MG/DL (ref 74–99)
GLUCOSE BLD MANUAL STRIP-MCNC: 301 MG/DL (ref 74–99)
GLUCOSE BLD MANUAL STRIP-MCNC: 73 MG/DL (ref 74–99)
GLUCOSE SERPL-MCNC: 297 MG/DL (ref 74–99)
HCT VFR BLD AUTO: 37.8 % (ref 36–46)
HGB BLD-MCNC: 11.9 G/DL (ref 12–16)
IMM GRANULOCYTES # BLD AUTO: 0.06 X10*3/UL (ref 0–0.5)
IMM GRANULOCYTES NFR BLD AUTO: 0.6 % (ref 0–0.9)
INR PPP: 2.3 (ref 0.9–1.1)
LYMPHOCYTES # BLD AUTO: 0.3 X10*3/UL (ref 0.8–3)
LYMPHOCYTES NFR BLD AUTO: 3 %
MCH RBC QN AUTO: 28.1 PG (ref 26–34)
MCHC RBC AUTO-ENTMCNC: 31.5 G/DL (ref 32–36)
MCV RBC AUTO: 89 FL (ref 80–100)
MONOCYTES # BLD AUTO: 0.57 X10*3/UL (ref 0.05–0.8)
MONOCYTES NFR BLD AUTO: 5.6 %
NEUTROPHILS # BLD AUTO: 9.18 X10*3/UL (ref 1.6–5.5)
NEUTROPHILS NFR BLD AUTO: 90.7 %
NRBC BLD-RTO: 0 /100 WBCS (ref 0–0)
PLATELET # BLD AUTO: 234 X10*3/UL (ref 150–450)
POTASSIUM SERPL-SCNC: 4.1 MMOL/L (ref 3.5–5.3)
PROT SERPL-MCNC: 6.5 G/DL (ref 6.4–8.2)
PROTHROMBIN TIME: 26.3 SECONDS (ref 9.8–12.8)
RBC # BLD AUTO: 4.23 X10*6/UL (ref 4–5.2)
SODIUM SERPL-SCNC: 141 MMOL/L (ref 136–145)
WBC # BLD AUTO: 10.1 X10*3/UL (ref 4.4–11.3)

## 2025-01-04 PROCEDURE — 36415 COLL VENOUS BLD VENIPUNCTURE: CPT | Performed by: INTERNAL MEDICINE

## 2025-01-04 PROCEDURE — 85610 PROTHROMBIN TIME: CPT

## 2025-01-04 PROCEDURE — 2500000004 HC RX 250 GENERAL PHARMACY W/ HCPCS (ALT 636 FOR OP/ED): Mod: JZ

## 2025-01-04 PROCEDURE — 2500000002 HC RX 250 W HCPCS SELF ADMINISTERED DRUGS (ALT 637 FOR MEDICARE OP, ALT 636 FOR OP/ED): Performed by: EMERGENCY MEDICINE

## 2025-01-04 PROCEDURE — 2500000004 HC RX 250 GENERAL PHARMACY W/ HCPCS (ALT 636 FOR OP/ED)

## 2025-01-04 PROCEDURE — 94640 AIRWAY INHALATION TREATMENT: CPT

## 2025-01-04 PROCEDURE — 2500000004 HC RX 250 GENERAL PHARMACY W/ HCPCS (ALT 636 FOR OP/ED): Performed by: INTERNAL MEDICINE

## 2025-01-04 PROCEDURE — 2500000002 HC RX 250 W HCPCS SELF ADMINISTERED DRUGS (ALT 637 FOR MEDICARE OP, ALT 636 FOR OP/ED): Performed by: STUDENT IN AN ORGANIZED HEALTH CARE EDUCATION/TRAINING PROGRAM

## 2025-01-04 PROCEDURE — 82947 ASSAY GLUCOSE BLOOD QUANT: CPT

## 2025-01-04 PROCEDURE — 1200000002 HC GENERAL ROOM WITH TELEMETRY DAILY

## 2025-01-04 PROCEDURE — 2500000001 HC RX 250 WO HCPCS SELF ADMINISTERED DRUGS (ALT 637 FOR MEDICARE OP)

## 2025-01-04 PROCEDURE — 2500000002 HC RX 250 W HCPCS SELF ADMINISTERED DRUGS (ALT 637 FOR MEDICARE OP, ALT 636 FOR OP/ED)

## 2025-01-04 PROCEDURE — 2500000001 HC RX 250 WO HCPCS SELF ADMINISTERED DRUGS (ALT 637 FOR MEDICARE OP): Performed by: STUDENT IN AN ORGANIZED HEALTH CARE EDUCATION/TRAINING PROGRAM

## 2025-01-04 PROCEDURE — 2500000005 HC RX 250 GENERAL PHARMACY W/O HCPCS: Performed by: EMERGENCY MEDICINE

## 2025-01-04 PROCEDURE — 99233 SBSQ HOSP IP/OBS HIGH 50: CPT | Performed by: INTERNAL MEDICINE

## 2025-01-04 PROCEDURE — 80053 COMPREHEN METABOLIC PANEL: CPT | Performed by: INTERNAL MEDICINE

## 2025-01-04 PROCEDURE — 85025 COMPLETE CBC W/AUTO DIFF WBC: CPT | Performed by: INTERNAL MEDICINE

## 2025-01-04 RX ORDER — GUAIFENESIN 600 MG/1
1200 TABLET, EXTENDED RELEASE ORAL 2 TIMES DAILY
Status: DISCONTINUED | OUTPATIENT
Start: 2025-01-04 | End: 2025-01-08 | Stop reason: HOSPADM

## 2025-01-04 RX ADMIN — INSULIN LISPRO 8 UNITS: 100 INJECTION, SOLUTION INTRAVENOUS; SUBCUTANEOUS at 08:46

## 2025-01-04 RX ADMIN — METHYLPREDNISOLONE SODIUM SUCCINATE 40 MG: 40 INJECTION, POWDER, FOR SOLUTION INTRAMUSCULAR; INTRAVENOUS at 14:17

## 2025-01-04 RX ADMIN — INSULIN LISPRO 10 UNITS: 100 INJECTION, SOLUTION INTRAVENOUS; SUBCUTANEOUS at 18:07

## 2025-01-04 RX ADMIN — AMLODIPINE BESYLATE 5 MG: 5 TABLET ORAL at 08:46

## 2025-01-04 RX ADMIN — LEVOTHYROXINE SODIUM 75 MCG: 0.07 TABLET ORAL at 05:56

## 2025-01-04 RX ADMIN — INSULIN LISPRO 10 UNITS: 100 INJECTION, SOLUTION INTRAVENOUS; SUBCUTANEOUS at 08:50

## 2025-01-04 RX ADMIN — METHYLPREDNISOLONE SODIUM SUCCINATE 40 MG: 40 INJECTION, POWDER, FOR SOLUTION INTRAMUSCULAR; INTRAVENOUS at 21:35

## 2025-01-04 RX ADMIN — GUAIFENESIN 1200 MG: 600 TABLET, EXTENDED RELEASE ORAL at 20:28

## 2025-01-04 RX ADMIN — ASPIRIN 81 MG: 81 TABLET, COATED ORAL at 08:46

## 2025-01-04 RX ADMIN — INSULIN GLARGINE 15 UNITS: 100 INJECTION, SOLUTION SUBCUTANEOUS at 20:22

## 2025-01-04 RX ADMIN — INSULIN LISPRO 6 UNITS: 100 INJECTION, SOLUTION INTRAVENOUS; SUBCUTANEOUS at 12:27

## 2025-01-04 RX ADMIN — IPRATROPIUM BROMIDE AND ALBUTEROL SULFATE 3 ML: .5; 3 SOLUTION RESPIRATORY (INHALATION) at 11:59

## 2025-01-04 RX ADMIN — GUAIFENESIN 600 MG: 600 TABLET, EXTENDED RELEASE ORAL at 08:46

## 2025-01-04 RX ADMIN — WARFARIN SODIUM 2.5 MG: 2.5 TABLET ORAL at 18:08

## 2025-01-04 RX ADMIN — IPRATROPIUM BROMIDE AND ALBUTEROL SULFATE 3 ML: .5; 3 SOLUTION RESPIRATORY (INHALATION) at 17:04

## 2025-01-04 RX ADMIN — METOPROLOL SUCCINATE 25 MG: 25 TABLET, EXTENDED RELEASE ORAL at 08:46

## 2025-01-04 RX ADMIN — Medication 2 L/MIN: at 07:19

## 2025-01-04 RX ADMIN — INSULIN LISPRO 10 UNITS: 100 INJECTION, SOLUTION INTRAVENOUS; SUBCUTANEOUS at 12:28

## 2025-01-04 RX ADMIN — METHYLPREDNISOLONE SODIUM SUCCINATE 40 MG: 40 INJECTION, POWDER, FOR SOLUTION INTRAMUSCULAR; INTRAVENOUS at 05:56

## 2025-01-04 RX ADMIN — Medication 2 L/MIN: at 20:11

## 2025-01-04 RX ADMIN — IPRATROPIUM BROMIDE AND ALBUTEROL SULFATE 3 ML: .5; 3 SOLUTION RESPIRATORY (INHALATION) at 07:19

## 2025-01-04 RX ADMIN — ALBUTEROL SULFATE 2.5 MG: 2.5 SOLUTION RESPIRATORY (INHALATION) at 02:38

## 2025-01-04 RX ADMIN — FUROSEMIDE 40 MG: 40 TABLET ORAL at 08:46

## 2025-01-04 ASSESSMENT — COGNITIVE AND FUNCTIONAL STATUS - GENERAL
WALKING IN HOSPITAL ROOM: A LITTLE
MOVING TO AND FROM BED TO CHAIR: A LITTLE
STANDING UP FROM CHAIR USING ARMS: A LITTLE
DAILY ACTIVITIY SCORE: 24
MOBILITY SCORE: 20
CLIMB 3 TO 5 STEPS WITH RAILING: A LITTLE

## 2025-01-04 ASSESSMENT — PAIN SCALES - GENERAL: PAINLEVEL_OUTOF10: 0 - NO PAIN

## 2025-01-04 NOTE — PROGRESS NOTES
Subjective Data:  Feels better still coughing up thick mucous    Overnight Events:    Nne reported     Objective Data:  Last Recorded Vitals:  Vitals:    01/04/25 0746 01/04/25 1135 01/04/25 1159 01/04/25 1250   BP: 176/72 (!) 195/81  143/60   BP Location:    Left arm   Patient Position:    Sitting   Pulse: 85 88  84   Resp:       Temp: 36.2 °C (97.2 °F) 36.4 °C (97.5 °F)     TempSrc:       SpO2: 97% 92% 95% 96%   Weight:       Height:           Last Labs:  CBC - 1/4/2025:  5:54 AM  10.1 11.9 234    37.8      CMP - 1/4/2025:  5:54 AM  8.7 6.5 20 --- 2.1   _ 3.7 36 82      PTT - No results in last year.  2.3   26.3 _     TROPHS   Date/Time Value Ref Range Status   12/31/2024 01:36 PM 27 0 - 13 ng/L Final   12/31/2024 11:41 AM 23 0 - 13 ng/L Final   12/13/2024 04:40 PM 14 0 - 13 ng/L Final     BNP   Date/Time Value Ref Range Status   12/31/2024 11:41  0 - 99 pg/mL Final   12/13/2024 02:27  0 - 99 pg/mL Final     HGBA1C   Date/Time Value Ref Range Status   09/09/2024 04:20 PM 7.6 4.2 - 6.5 % Final   11/09/2023 04:14 PM 6.9 4.2 - 6.5 % Final   02/02/2023 02:04 PM 6.2 4.2 - 6.5 % Final   05/04/2022 03:50 PM 6.1 4.2 - 6.5 % Final     LDLCALC   Date/Time Value Ref Range Status   09/09/2024 04:30 PM 98 <=99 mg/dL Final     Comment:                                 Near   Borderline      AGE      Desirable  Optimal    High     High     Very High     0-19 Y     0 - 109     ---    110-129   >/= 130     ----    20-24 Y     0 - 119     ---    120-159   >/= 160     ----      >24 Y     0 -  99   100-129  130-159   160-189     >/=190     11/09/2023 03:26 PM 85 <=99 mg/dL Final     Comment:                                 Near   Borderline      AGE      Desirable  Optimal    High     High     Very High     0-19 Y     0 - 109     ---    110-129   >/= 130     ----    20-24 Y     0 - 119     ---    120-159   >/= 160     ----      >24 Y     0 -  99   100-129  130-159   160-189     >/=190       VLDL   Date/Time Value Ref Range  Status   09/09/2024 04:30 PM 28 0 - 40 mg/dL Final   11/09/2023 03:26 PM 30 0 - 40 mg/dL Final   05/11/2023 01:57 PM 31 0 - 40 mg/dL Final   02/02/2023 07:44 AM 28 0 - 40 mg/dL Final   05/04/2022 12:00 AM 22 0 - 40 mg/dL Final      Last I/O:  I/O last 3 completed shifts:  In: - (0 mL/kg)   Out: 1900 (19.9 mL/kg) [Urine:1900 (0.6 mL/kg/hr)]  Weight: 95.3 kg     Past Cardiology Tests (Last 3 Years):  EKG:  ECG 12 lead 12/31/2024 (Preliminary)      ECG 12 lead (Ancillary Performed) 12/17/2024      ECG 12 Lead 09/05/2024      ECG 12 Lead 03/20/2024      ECG 12 Lead 12/11/2023    Echo:  Transthoracic Echo (TTE) Complete 03/20/2024    Ejection Fractions:  EF   Date/Time Value Ref Range Status   03/20/2024 02:02 PM 63 %      Cath:  No results found for this or any previous visit from the past 1095 days.    Stress Test:  No results found for this or any previous visit from the past 1095 days.    Cardiac Imaging:  No results found for this or any previous visit from the past 1095 days.      Inpatient Medications:  Scheduled medications   Medication Dose Route Frequency    amLODIPine  5 mg oral Daily    aspirin  81 mg oral Daily    furosemide  40 mg oral Daily    guaiFENesin  600 mg oral BID    insulin glargine  15 Units subcutaneous q24h    insulin lispro  0-10 Units subcutaneous TID AC    insulin lispro  10 Units subcutaneous TID AC    ipratropium-albuteroL  3 mL nebulization TID    levothyroxine  75 mcg oral Daily    methylPREDNISolone sodium succinate (PF)  40 mg intravenous q8h KIMBER    metoprolol succinate XL  25 mg oral Daily    oxygen   inhalation Continuous - Inhalation    psyllium  1 packet oral Daily    warfarin  2.5 mg oral Once per day on Sunday Tuesday Wednesday Thursday Friday Saturday    [START ON 1/6/2025] warfarin  5 mg oral Every Monday     PRN medications   Medication    acetaminophen    Or    acetaminophen    Or    acetaminophen    albuterol    benzonatate    dextrose    dextrose    dextrose    dextrose     glucagon    glucagon    glucagon    glucagon    ondansetron    Or    ondansetron     Continuous Medications   Medication Dose Last Rate       Physical Exam:  GEN: Obese 73 yo wf sitting comfortably in the bedside chair  HEENT: Atraumatic, normocephalic  COR: Irreg. Irreg  LUNGS; Few rhonchi  EXT: Warm       Assessment/Plan   1.) Influenza improving  2.) Afib rate controlled  3.) S/P mechanical MVR  PLAN:  1.) Continue tele monitor  2.) Echo pending  Peripheral IV 01/03/25 22 G Right;Anterior Wrist (Active)   Site Assessment Clean;Dry;Intact 01/04/25 0900   Dressing Status Dry;Clean 01/04/25 0900   Number of days: 1       Code Status:  Full Code    I spent 30 minutes in the professional and overall care of this patient.        Lul Day MD

## 2025-01-05 ENCOUNTER — APPOINTMENT (OUTPATIENT)
Dept: RADIOLOGY | Facility: HOSPITAL | Age: 75
End: 2025-01-05
Payer: MEDICARE

## 2025-01-05 VITALS
HEIGHT: 62 IN | HEART RATE: 80 BPM | DIASTOLIC BLOOD PRESSURE: 73 MMHG | BODY MASS INDEX: 38.64 KG/M2 | RESPIRATION RATE: 18 BRPM | OXYGEN SATURATION: 96 % | TEMPERATURE: 97.5 F | WEIGHT: 210 LBS | SYSTOLIC BLOOD PRESSURE: 163 MMHG

## 2025-01-05 LAB
ALBUMIN SERPL BCP-MCNC: 3.5 G/DL (ref 3.4–5)
ALP SERPL-CCNC: 73 U/L (ref 33–136)
ALT SERPL W P-5'-P-CCNC: 25 U/L (ref 7–45)
ANION GAP SERPL CALC-SCNC: 10 MMOL/L (ref 10–20)
AST SERPL W P-5'-P-CCNC: 17 U/L (ref 9–39)
BILIRUB DIRECT SERPL-MCNC: 0.3 MG/DL (ref 0–0.3)
BILIRUB SERPL-MCNC: 2.2 MG/DL (ref 0–1.2)
BUN SERPL-MCNC: 34 MG/DL (ref 6–23)
CALCIUM SERPL-MCNC: 8.7 MG/DL (ref 8.6–10.3)
CHLORIDE SERPL-SCNC: 94 MMOL/L (ref 98–107)
CO2 SERPL-SCNC: 36 MMOL/L (ref 21–32)
CREAT SERPL-MCNC: 0.79 MG/DL (ref 0.5–1.05)
EGFRCR SERPLBLD CKD-EPI 2021: 80 ML/MIN/1.73M*2
ERYTHROCYTE [DISTWIDTH] IN BLOOD BY AUTOMATED COUNT: 15.6 % (ref 11.5–14.5)
GLUCOSE BLD MANUAL STRIP-MCNC: 222 MG/DL (ref 74–99)
GLUCOSE BLD MANUAL STRIP-MCNC: 279 MG/DL (ref 74–99)
GLUCOSE BLD MANUAL STRIP-MCNC: 311 MG/DL (ref 74–99)
GLUCOSE BLD MANUAL STRIP-MCNC: 318 MG/DL (ref 74–99)
GLUCOSE SERPL-MCNC: 234 MG/DL (ref 74–99)
HCT VFR BLD AUTO: 37.3 % (ref 36–46)
HGB BLD-MCNC: 11.5 G/DL (ref 12–16)
INR PPP: 2.5 (ref 0.9–1.1)
MCH RBC QN AUTO: 27.3 PG (ref 26–34)
MCHC RBC AUTO-ENTMCNC: 30.8 G/DL (ref 32–36)
MCV RBC AUTO: 88 FL (ref 80–100)
NRBC BLD-RTO: 0 /100 WBCS (ref 0–0)
PLATELET # BLD AUTO: 199 X10*3/UL (ref 150–450)
POTASSIUM SERPL-SCNC: 4 MMOL/L (ref 3.5–5.3)
PROT SERPL-MCNC: 6.3 G/DL (ref 6.4–8.2)
PROTHROMBIN TIME: 28.6 SECONDS (ref 9.8–12.8)
RBC # BLD AUTO: 4.22 X10*6/UL (ref 4–5.2)
SODIUM SERPL-SCNC: 136 MMOL/L (ref 136–145)
WBC # BLD AUTO: 10.7 X10*3/UL (ref 4.4–11.3)

## 2025-01-05 PROCEDURE — 36415 COLL VENOUS BLD VENIPUNCTURE: CPT | Performed by: STUDENT IN AN ORGANIZED HEALTH CARE EDUCATION/TRAINING PROGRAM

## 2025-01-05 PROCEDURE — 80053 COMPREHEN METABOLIC PANEL: CPT | Performed by: STUDENT IN AN ORGANIZED HEALTH CARE EDUCATION/TRAINING PROGRAM

## 2025-01-05 PROCEDURE — 2500000002 HC RX 250 W HCPCS SELF ADMINISTERED DRUGS (ALT 637 FOR MEDICARE OP, ALT 636 FOR OP/ED)

## 2025-01-05 PROCEDURE — 85610 PROTHROMBIN TIME: CPT | Performed by: PHYSICIAN ASSISTANT

## 2025-01-05 PROCEDURE — 71045 X-RAY EXAM CHEST 1 VIEW: CPT

## 2025-01-05 PROCEDURE — 2500000001 HC RX 250 WO HCPCS SELF ADMINISTERED DRUGS (ALT 637 FOR MEDICARE OP)

## 2025-01-05 PROCEDURE — 71045 X-RAY EXAM CHEST 1 VIEW: CPT | Performed by: RADIOLOGY

## 2025-01-05 PROCEDURE — 94640 AIRWAY INHALATION TREATMENT: CPT

## 2025-01-05 PROCEDURE — 2500000001 HC RX 250 WO HCPCS SELF ADMINISTERED DRUGS (ALT 637 FOR MEDICARE OP): Performed by: STUDENT IN AN ORGANIZED HEALTH CARE EDUCATION/TRAINING PROGRAM

## 2025-01-05 PROCEDURE — 1200000002 HC GENERAL ROOM WITH TELEMETRY DAILY

## 2025-01-05 PROCEDURE — 2500000004 HC RX 250 GENERAL PHARMACY W/ HCPCS (ALT 636 FOR OP/ED): Mod: JZ

## 2025-01-05 PROCEDURE — 2500000002 HC RX 250 W HCPCS SELF ADMINISTERED DRUGS (ALT 637 FOR MEDICARE OP, ALT 636 FOR OP/ED): Performed by: STUDENT IN AN ORGANIZED HEALTH CARE EDUCATION/TRAINING PROGRAM

## 2025-01-05 PROCEDURE — 2500000002 HC RX 250 W HCPCS SELF ADMINISTERED DRUGS (ALT 637 FOR MEDICARE OP, ALT 636 FOR OP/ED): Performed by: EMERGENCY MEDICINE

## 2025-01-05 PROCEDURE — 82248 BILIRUBIN DIRECT: CPT | Performed by: INTERNAL MEDICINE

## 2025-01-05 PROCEDURE — 2500000005 HC RX 250 GENERAL PHARMACY W/O HCPCS: Performed by: EMERGENCY MEDICINE

## 2025-01-05 PROCEDURE — 2500000004 HC RX 250 GENERAL PHARMACY W/ HCPCS (ALT 636 FOR OP/ED): Performed by: INTERNAL MEDICINE

## 2025-01-05 PROCEDURE — 82947 ASSAY GLUCOSE BLOOD QUANT: CPT

## 2025-01-05 PROCEDURE — 85027 COMPLETE CBC AUTOMATED: CPT | Performed by: STUDENT IN AN ORGANIZED HEALTH CARE EDUCATION/TRAINING PROGRAM

## 2025-01-05 RX ORDER — FUROSEMIDE 10 MG/ML
20 INJECTION INTRAMUSCULAR; INTRAVENOUS ONCE
Status: COMPLETED | OUTPATIENT
Start: 2025-01-05 | End: 2025-01-05

## 2025-01-05 RX ADMIN — ASPIRIN 81 MG: 81 TABLET, COATED ORAL at 08:33

## 2025-01-05 RX ADMIN — INSULIN LISPRO 6 UNITS: 100 INJECTION, SOLUTION INTRAVENOUS; SUBCUTANEOUS at 12:46

## 2025-01-05 RX ADMIN — INSULIN LISPRO 4 UNITS: 100 INJECTION, SOLUTION INTRAVENOUS; SUBCUTANEOUS at 08:33

## 2025-01-05 RX ADMIN — METOPROLOL SUCCINATE 25 MG: 25 TABLET, EXTENDED RELEASE ORAL at 08:38

## 2025-01-05 RX ADMIN — INSULIN LISPRO 10 UNITS: 100 INJECTION, SOLUTION INTRAVENOUS; SUBCUTANEOUS at 08:35

## 2025-01-05 RX ADMIN — LEVOTHYROXINE SODIUM 75 MCG: 0.07 TABLET ORAL at 05:55

## 2025-01-05 RX ADMIN — INSULIN GLARGINE 15 UNITS: 100 INJECTION, SOLUTION SUBCUTANEOUS at 20:11

## 2025-01-05 RX ADMIN — IPRATROPIUM BROMIDE AND ALBUTEROL SULFATE 3 ML: .5; 3 SOLUTION RESPIRATORY (INHALATION) at 06:51

## 2025-01-05 RX ADMIN — METHYLPREDNISOLONE SODIUM SUCCINATE 40 MG: 40 INJECTION, POWDER, FOR SOLUTION INTRAMUSCULAR; INTRAVENOUS at 05:54

## 2025-01-05 RX ADMIN — INSULIN LISPRO 8 UNITS: 100 INJECTION, SOLUTION INTRAVENOUS; SUBCUTANEOUS at 17:24

## 2025-01-05 RX ADMIN — FUROSEMIDE 40 MG: 40 TABLET ORAL at 08:33

## 2025-01-05 RX ADMIN — INSULIN LISPRO 10 UNITS: 100 INJECTION, SOLUTION INTRAVENOUS; SUBCUTANEOUS at 12:47

## 2025-01-05 RX ADMIN — AMLODIPINE BESYLATE 5 MG: 5 TABLET ORAL at 08:36

## 2025-01-05 RX ADMIN — INSULIN LISPRO 10 UNITS: 100 INJECTION, SOLUTION INTRAVENOUS; SUBCUTANEOUS at 17:25

## 2025-01-05 RX ADMIN — METHYLPREDNISOLONE SODIUM SUCCINATE 40 MG: 40 INJECTION, POWDER, FOR SOLUTION INTRAMUSCULAR; INTRAVENOUS at 13:18

## 2025-01-05 RX ADMIN — GUAIFENESIN 1200 MG: 600 TABLET, EXTENDED RELEASE ORAL at 20:14

## 2025-01-05 RX ADMIN — WARFARIN SODIUM 2.5 MG: 2.5 TABLET ORAL at 17:47

## 2025-01-05 RX ADMIN — IPRATROPIUM BROMIDE AND ALBUTEROL SULFATE 3 ML: .5; 3 SOLUTION RESPIRATORY (INHALATION) at 12:13

## 2025-01-05 RX ADMIN — FUROSEMIDE 20 MG: 10 INJECTION, SOLUTION INTRAMUSCULAR; INTRAVENOUS at 12:02

## 2025-01-05 RX ADMIN — Medication 24 PERCENT: at 19:01

## 2025-01-05 RX ADMIN — METHYLPREDNISOLONE SODIUM SUCCINATE 40 MG: 40 INJECTION, POWDER, FOR SOLUTION INTRAMUSCULAR; INTRAVENOUS at 22:15

## 2025-01-05 RX ADMIN — GUAIFENESIN 1200 MG: 600 TABLET, EXTENDED RELEASE ORAL at 08:33

## 2025-01-05 RX ADMIN — IPRATROPIUM BROMIDE AND ALBUTEROL SULFATE 3 ML: .5; 3 SOLUTION RESPIRATORY (INHALATION) at 19:01

## 2025-01-05 ASSESSMENT — COGNITIVE AND FUNCTIONAL STATUS - GENERAL
DAILY ACTIVITIY SCORE: 24
MOVING TO AND FROM BED TO CHAIR: A LITTLE
CLIMB 3 TO 5 STEPS WITH RAILING: A LITTLE
WALKING IN HOSPITAL ROOM: A LITTLE
MOBILITY SCORE: 20
STANDING UP FROM CHAIR USING ARMS: A LITTLE

## 2025-01-05 ASSESSMENT — PAIN SCALES - GENERAL
PAINLEVEL_OUTOF10: 0 - NO PAIN
PAINLEVEL_OUTOF10: 0 - NO PAIN

## 2025-01-05 NOTE — PROGRESS NOTES
"Genet Wilks is a 74 y.o. female on day 5 of admission presenting with Influenza A.    Subjective   No events overnight.  Patient seen and examined at bedside.  She remains on supplemental O2.  She is still having significant wheezing and coughing but improved. The flutter valve is helping.        Objective     Physical Exam  Constitutional:       Appearance: Normal appearance.   HENT:      Head: Normocephalic and atraumatic.      Right Ear: Tympanic membrane and ear canal normal.      Left Ear: Tympanic membrane and ear canal normal.      Mouth/Throat:      Mouth: Mucous membranes are moist.      Pharynx: Oropharynx is clear.   Eyes:      Extraocular Movements: Extraocular movements intact.      Conjunctiva/sclera: Conjunctivae normal.      Pupils: Pupils are equal, round, and reactive to light.   Cardiovascular:      Rate and Rhythm: Normal rate and regular rhythm.      Pulses: Normal pulses.      Heart sounds: Normal heart sounds.   Pulmonary:      Effort: Pulmonary effort is normal.      Breath sounds: Normal breath sounds.   Abdominal:      General: Abdomen is flat. Bowel sounds are normal.      Palpations: Abdomen is soft.   Musculoskeletal:         General: Normal range of motion.      Cervical back: Normal range of motion and neck supple.   Skin:     General: Skin is warm and dry.      Capillary Refill: Capillary refill takes 2 to 3 seconds.   Neurological:      General: No focal deficit present.      Mental Status: She is alert and oriented to person, place, and time. Mental status is at baseline.   Psychiatric:         Mood and Affect: Mood normal.         Behavior: Behavior normal.         Thought Content: Thought content normal.         Judgment: Judgment normal.         Last Recorded Vitals  Blood pressure 135/65, pulse 79, temperature 35.5 °C (95.9 °F), temperature source Temporal, resp. rate 18, height 1.575 m (5' 2\"), weight 95.3 kg (210 lb), SpO2 95%.  Intake/Output last 3 Shifts:  I/O last 3 " completed shifts:  In: - (0 mL/kg)   Out: 1450 (15.2 mL/kg) [Urine:1450 (0.4 mL/kg/hr)]  Weight: 95.3 kg     Relevant Results                              Assessment/Plan   Assessment & Plan  Influenza A    74-year-old female with past medical history of CHF on 2L nasal cannula as needed presented to ED for flulike symptoms found to be flu a positive.  Patient treated with Solu-Medrol, oxygen, breathing treatments in the ED and admitted to inpatient with telemetry under Dr. Hansen for further evaluation and care.     #Influenza A  #COPD exacerbation  #Hypomagnesemia  #A-fib with RVR  #Lactatemia  - Treated with oxygen, breathing treatments, Solu-Medrol and magnesium replaced in ED  - Continue above treatments  - IV fluids, trend lactate  - Vitals every 4 hours, telemetry  - Droplet precautions  - PT/OT/social work  -Continue home Coumadin, SCD for DVT PPx  - Head of bed 30 degrees, aspiration precautions     Chronic conditions  #Mitral valve replacement on Coumadin  #HTN  #HLD  #DM  #CHF on 2L nasal cannula as needed  - Continue home meds  - Cardiac diet    1/2: doing ok, montior labs, wean oxygen. Overall doing well, dc in next 24-48 hrs    1/3: continue breathing treatments, lasix one time, steroids continued, insulin added to help with sugars. Will monitr    1/4: Patient's bilirubin increased today from 1.4 up to 2.1.  She reports a history of a cholecystectomy in about 2017 due to gallstones.  She has no right upper quadrant pain.  Will monitor LFTs.  Continue bronchodilators.  Podiatry consulted for nail care.    1/5: Bilirubin relatively stable at 2.2, will defer imaging at this time and repeat labs tomorrow.  Repeat chest x-ray reviewed today.  Will give an additional dose of IV Lasix.        I spent 55 minutes in the professional and overall care of this patient.      Paulo Contreras, DO

## 2025-01-05 NOTE — CONSULTS
PODIATRY CONSULT NOTE    SERVICE DATE: 1/5/2025   SERVICE TIME:  10:40    REASON FOR CONSULT: Nail care  REQUESTING PHYSICIAN: Paulo Contreras DO  PRIMARY CARE PHYSICIAN: Mitesh Simms DO    Subjective   HPI: Ms. Wilks is a 74 y.o. female  past medical history for mitral valve replacement on Coumadin, HTN, HLD, DM and CHF on 2 L NC prn Q HS presenting to ED today from home by herself for evaluation of cough and shortness of breath. Podiatry consulted for nail care.  Patient states she can no longer reach her nails and wishes to follow a podiatrist outpatient for nail care.  Says the nails get caught in her socks.  No other pedal complaints.      PMH: Reviewed/documented below.  PSH:  Noncontributory except per HPI   FH: Reviewed and noncontributory   SOCIAL:  Reviewed/documented below.  ALLERGIES: Reviewed/documented below.  MEDS: Reviewed/documented below.  VS: Reviewed/documented below.    Review of Systems   Past Medical History:   Diagnosis Date    Long term (current) use of anticoagulants     Long-term (current) use of anticoagulants    Old myocardial infarction     History of non-ST elevation myocardial infarction (NSTEMI)    Personal history of (corrected) congenital malformations of heart and circulatory system     History of congenital anomaly of heart    Personal history of other diseases of the circulatory system     History of rheumatic fever    Personal history of other diseases of the circulatory system     History of mitral valve prolapse    Personal history of other diseases of the circulatory system     History of atrial fibrillation    Personal history of other diseases of the musculoskeletal system and connective tissue     History of back pain    Personal history of other diseases of the nervous system and sense organs     History of carpal tunnel syndrome    Personal history of other diseases of the respiratory system     History of asthma    Personal history of other endocrine,  nutritional and metabolic disease     History of morbid obesity    Personal history of other endocrine, nutritional and metabolic disease     History of hypothyroidism    Personal history of other endocrine, nutritional and metabolic disease     History of hypercholesterolemia    Personal history of other specified conditions     History of chest pain    Presence of other heart-valve replacement     Heart valve replaced by other means    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    Mechanical heart valve present     Past Surgical History:   Procedure Laterality Date    CARDIAC CATHETERIZATION  06/15/2018    Cardiac Cath Procedure Outcome:    CARDIAC SURGERY  11/13/2014    Heart Surgery    KNEE SURGERY  06/15/2018    Knee Surgery    OTHER SURGICAL HISTORY  11/13/2014    Surgery    OTHER SURGICAL HISTORY  11/13/2014    Colposcopy Cervix With Biopsy(S)    TONSILLECTOMY  11/13/2014    Tonsillectomy    TUBAL LIGATION  11/13/2014    Tubal Ligation     Family History   Problem Relation Name Age of Onset    Diabetes Mother      Stroke Father      Hypertension Father      Diabetes Sister      Breast cancer Sister      Throat cancer Brother      Diabetes Brother      Other (S/P CABG x3) Brother      Cancer Other sibling      Social History     Tobacco Use    Smoking status: Never     Passive exposure: Never    Smokeless tobacco: Never   Vaping Use    Vaping status: Never Used   Substance Use Topics    Alcohol use: Not Currently    Drug use: Never      Medications Prior to Admission   Medication Sig Dispense Refill Last Dose/Taking    amLODIPine (Norvasc) 5 mg tablet Take 1 tablet by mouth once daily 90 tablet 3 12/30/2024    aspirin 81 mg EC tablet Take by mouth.   12/30/2024 Morning    atorvastatin (Lipitor) 20 mg tablet TAKE 1 TABLET BY MOUTH AT BEDTIME 90 tablet 3 12/30/2024 Bedtime    cholecalciferol (Vitamin D-3) 50 MCG (2000 UT) tablet Take 1 tablet (2,000 Units) by mouth once daily.   12/30/2024    glimepiride  (Amaryl) 2 mg tablet TAKE 1 TABLET BY MOUTH ONCE DAILY AS DIRECTED (Patient taking differently: Take 1 tablet (2 mg) by mouth once daily in the morning. Take before meals.) 90 tablet 1 12/30/2024    levothyroxine (Synthroid, Levoxyl) 75 mcg tablet TAKE 1 TABLET BY MOUTH ONCE DAILY AS DIRECTED (Patient taking differently: Take 1 tablet (75 mcg) by mouth early in the morning..) 90 tablet 1 12/30/2024    oxygen (O2) gas therapy Inhale continuously.   12/31/2024    warfarin (Coumadin) 5 mg tablet Take 5 mg (1 tablet) every Monday and 2.5 mg (1/2 tablet) all other days or as directed by the anticoagulation clinic. 55 tablet 1 12/30/2024    albuterol 0.63 mg/3 mL nebulizer solution Take 3 mL (0.63 mg) by nebulization 3 times a day. (Patient taking differently: Take 3 mL (0.63 mg) by nebulization every 8 hours if needed.) 270 mL 3 Unknown    albuterol 90 mcg/actuation inhaler Inhale 2 puffs every 6 hours if needed for wheezing or shortness of breath.       benzonatate (Tessalon) 100 mg capsule Take by mouth. (Patient not taking: Reported on 12/31/2024)   Not Taking    blood sugar diagnostic (OneTouch Ultra Test) strip USE 1 STRIP TO CHECK GLUCOSE ONCE DAILY 100 each 3 Unknown    furosemide (Lasix) 40 mg tablet Take 1 tablet (40 mg) by mouth once daily as needed (swelling).   Unknown    gabapentin (Neurontin) 100 mg capsule Take by mouth. (Patient not taking: Reported on 12/31/2024)   Not Taking    lancets (OneTouch Delica Plus Lancet) 33 gauge misc USE 1 TO CHECK GLUCOSE ONCE DAILY 100 each 3 Unknown    metoprolol succinate XL (Toprol-XL) 25 mg 24 hr tablet Take 1 tablet (25 mg) by mouth once daily. (Patient not taking: Reported on 12/31/2024)   Not Taking        Medications:  Scheduled Meds: amLODIPine, 5 mg, oral, Daily  aspirin, 81 mg, oral, Daily  furosemide, 20 mg, intravenous, Once  furosemide, 40 mg, oral, Daily  guaiFENesin, 1,200 mg, oral, BID  insulin glargine, 15 Units, subcutaneous, q24h  insulin lispro, 0-10  Units, subcutaneous, TID AC  insulin lispro, 10 Units, subcutaneous, TID AC  ipratropium-albuteroL, 3 mL, nebulization, TID  levothyroxine, 75 mcg, oral, Daily  methylPREDNISolone sodium succinate (PF), 40 mg, intravenous, q8h KIMBER  metoprolol succinate XL, 25 mg, oral, Daily  oxygen, , inhalation, Continuous - Inhalation  psyllium, 1 packet, oral, Daily  warfarin, 2.5 mg, oral, Once per day on Sunday Tuesday Wednesday Thursday Friday Saturday  [START ON 1/6/2025] warfarin, 5 mg, oral, Every Monday      Continuous Infusions:    PRN Meds: PRN medications: acetaminophen **OR** acetaminophen **OR** acetaminophen, albuterol, benzonatate, dextrose, dextrose, dextrose, dextrose, glucagon, glucagon, glucagon, glucagon, ondansetron **OR** ondansetron    Allergies as of 12/31/2024 - Reviewed 12/31/2024   Allergen Reaction Noted    Penicillins Hives and Itching 10/04/2023    Prednisone Itching 09/15/2017    Mushroom Hives and Itching 10/04/2023    Simvastatin Rash 10/04/2023            Objective   PHYSICAL EXAM:  Physical Exam Performed:  Vitals:    01/05/25 0755   BP: 135/65   Pulse: 79   Resp: 18   Temp: 35.5 °C (95.9 °F)   SpO2: 95%     Body mass index is 38.41 kg/m².    Patient is AOx3 and in no acute distress. Patient is alert and cooperative.     Vascular: Palpable Dorsalis Pedis /Posterior Tibial pulses bilaterally. No edema noted bilaterally. Hair growth present B/L. Capillary Fill Time is less/greater than 5 seconds to bilaterally hallux. Temperature is warm to cool from tibial tuberosity to distal digits bilaterally. No lymphatic streaking noted bilaterally.    Musculoskeletal: Gross active and passive ROM intact to age and activity level. Moves all extremities spontaneously. No pain to palpation at feet bilaterally.     Neurological: Partially Diminished light touch sensation bilaterally. Pain stimuli intact bilaterally. Denies any numbness, burning or tingling.    Dermatologic: Nails 1-5 are thickened and painful  "Bilaterally. Skin appears well hydrated Bilaterally. Web spaces 1-4 bilaterally are clean, dry and intact. No rashes or nodules noted bilaterally. No hyperkeratotic tissue noted bilaterally.       LABS:   Results for orders placed or performed during the hospital encounter of 12/31/24 (from the past 24 hours)   POCT GLUCOSE   Result Value Ref Range    POCT Glucose 266 (H) 74 - 99 mg/dL   POCT GLUCOSE   Result Value Ref Range    POCT Glucose 73 (L) 74 - 99 mg/dL   POCT GLUCOSE   Result Value Ref Range    POCT Glucose 247 (H) 74 - 99 mg/dL   POCT GLUCOSE   Result Value Ref Range    POCT Glucose 222 (H) 74 - 99 mg/dL   CBC   Result Value Ref Range    WBC 10.7 4.4 - 11.3 x10*3/uL    nRBC 0.0 0.0 - 0.0 /100 WBCs    RBC 4.22 4.00 - 5.20 x10*6/uL    Hemoglobin 11.5 (L) 12.0 - 16.0 g/dL    Hematocrit 37.3 36.0 - 46.0 %    MCV 88 80 - 100 fL    MCH 27.3 26.0 - 34.0 pg    MCHC 30.8 (L) 32.0 - 36.0 g/dL    RDW 15.6 (H) 11.5 - 14.5 %    Platelets 199 150 - 450 x10*3/uL   Basic Metabolic Panel   Result Value Ref Range    Glucose 234 (H) 74 - 99 mg/dL    Sodium 136 136 - 145 mmol/L    Potassium 4.0 3.5 - 5.3 mmol/L    Chloride 94 (L) 98 - 107 mmol/L    Bicarbonate 36 (H) 21 - 32 mmol/L    Anion Gap 10 10 - 20 mmol/L    Urea Nitrogen 34 (H) 6 - 23 mg/dL    Creatinine 0.79 0.50 - 1.05 mg/dL    eGFR 80 >60 mL/min/1.73m*2    Calcium 8.7 8.6 - 10.3 mg/dL   Protime-INR   Result Value Ref Range    Protime 28.6 (H) 9.8 - 12.8 seconds    INR 2.5 (H) 0.9 - 1.1   Hepatic function panel   Result Value Ref Range    Albumin 3.5 3.4 - 5.0 g/dL    Bilirubin, Total 2.2 (H) 0.0 - 1.2 mg/dL    Bilirubin, Direct 0.3 0.0 - 0.3 mg/dL    Alkaline Phosphatase 73 33 - 136 U/L    ALT 25 7 - 45 U/L    AST 17 9 - 39 U/L    Total Protein 6.3 (L) 6.4 - 8.2 g/dL      Lab Results   Component Value Date    HGBA1C 7.6 (H) 09/09/2024      No results found for: \"CRP\"   Lab Results   Component Value Date    SEDRATE 11 04/07/2020        Results from last 7 days   Lab " Units 01/05/25  0828   WBC AUTO x10*3/uL 10.7   RBC AUTO x10*6/uL 4.22   HEMOGLOBIN g/dL 11.5*   HEMATOCRIT % 37.3     Results from last 7 days   Lab Units 01/05/25  0828 01/02/25  0659 01/01/25  0544   SODIUM mmol/L 136   < > 133*   POTASSIUM mmol/L 4.0   < > 4.7   CHLORIDE mmol/L 94*   < > 101   CO2 mmol/L 36*   < > 25   BUN mg/dL 34*   < > 22   CREATININE mg/dL 0.79   < > 1.01   CALCIUM mg/dL 8.7   < > 8.7   MAGNESIUM mg/dL  --   --  1.89   BILIRUBIN TOTAL mg/dL 2.2*   < >  --    ALT U/L 25   < >  --    AST U/L 17   < >  --     < > = values in this interval not displayed.     Results from last 7 days   Lab Units 12/31/24  1522   COLOR U  Yellow   APPEARANCE U  Turbid*   PH U  6.0   SPEC GRAV UR  1.026   PROTEIN U mg/dL 100 (2+)*   BLOOD UR  0.2 (2+)*   NITRITE U  NEGATIVE   WBC UR /HPF 1-5       IMAGING REVIEW:  XR chest 1 view    Result Date: 1/3/2025  Interpreted By:  Mich Contreras, STUDY: XR CHEST 1 VIEW;  1/3/2025 2:35 am   INDICATION: Signs/Symptoms:?fluid overload.     COMPARISON: 01/02/2025   ACCESSION NUMBER(S): [id number]   ORDERING CLINICIAN: BJORN GEORGES   FINDINGS: Status post median sternotomy. Mitral valve prosthesis noted.   Cardiomegaly. The pulmonary vasculature is congested centrally and indistinct peripherally, with peribronchovascular and interlobular septal thickening, as well as indistinct mid to lower lung consolidative opacities consistent with pulmonary edema. No pneumothorax.         Pulmonary edema, slightly progressed from 01/02/2025.   Cardiomegaly.   MACRO: None.   Signed by: Mich Contreras 1/3/2025 2:58 AM Dictation workstation:   [id number]    ECG 12 lead    Result Date: 1/2/2025  Atrial fibrillation with rapid ventricular response Rightward axis Nonspecific ST abnormality Abnormal ECG When compared with ECG of 13-DEC-2024 12:30, No significant change was found    XR chest 2 views    Result Date: 1/2/2025  Interpreted By:  Varsha Anguiano, STUDY: XR CHEST 2 VIEWS;  1/2/2025  9:11 am   INDICATION: Signs/Symptoms:sob.   COMPARISON: 12/31/2024   ACCESSION NUMBER(S): [id number]   ORDERING CLINICIAN: YUNIER DSOUZA   FINDINGS: CARDIOMEDIASTINAL SILHOUETTE: Cardiomediastinal silhouette is normal in size and configuration. There are sternotomy wires from prior cardiac surgery. There is a prosthetic mitral valve.   LUNGS: There has been interval development of a reticulonodular interstitial pattern, likely pneumonia. There is no focal dense area of consolidation.   ABDOMEN: No remarkable upper abdominal findings.     BONES: No acute osseous changes.       Bilateral reticulonodular interstitial opacities consistent with pneumonia. This represents a change compared to 12/31/2024   MACRO: None   Signed by: Varsha Anguiano 1/2/2025 11:28 AM Dictation workstation:   [id number]    XR chest 2 views    Result Date: 12/31/2024  Interpreted By:  Silvano Verma, STUDY: XR CHEST 2 VIEWS;  12/31/2024 11:48 am   INDICATION: Signs/Symptoms:Short of breath, cough.   COMPARISON: 02/13/2024   ACCESSION NUMBER(S): [id number]   ORDERING CLINICIAN: SHAD MEZA   FINDINGS: CARDIOMEDIASTINAL SILHOUETTE AND VASCULATURE:   Cardiac size:  Within normal limits. Status post median sternotomy with valve replacement similar to the previous exam. Aortic shadow: Within normal limits.   Mediastinal contours: Within normal limits.   Pulmonary vasculature:  The central vasculature is unremarkable   LUNGS: Multiple punctate radiodensities overlie the chest, which may be within clothing or artifact. Given this the lungs appear clear without discrete infiltrate or effusion.   ABDOMEN AND OTHER FINDINGS: No remarkable upper abdominal findings.   BONES: No acute osseous changes.       1.  No active cardiopulmonary disease.  There has not been significant interval change from the prior exam.   Signed by: Silvano Verma 12/31/2024 12:20 PM Dictation workstation:   [id number]    ECG 12 lead (Ancillary Performed)    Result Date:  12/19/2024  Atrial fibrillation Rightward axis Cannot rule out Anterior infarct , age undetermined Abnormal ECG No previous ECGs available See ED provider note for full interpretation and clinical correlation Confirmed by Michelle Mackenzie ([id number]) on 12/19/2024 9:50:07 PM    XR chest 2 views    Result Date: 12/13/2024  Interpreted By:  Parminder Fabian, STUDY: XR CHEST 2 VIEWS   INDICATION: Signs/Symptoms:c/f pneumonia.   COMPARISON: May 2, 2023   ACCESSION NUMBER(S): [id number]   ORDERING CLINICIAN: KATERINA PELAEZ   FINDINGS: Cardiomegaly with valve replacement.   Slight prominence of the perihilar and basilar interstitium with some thickening of the fissures suggesting a mild component of edema.   No large consolidation or effusion.         Slight prominence of the perihilar and basilar interstitium with some thickening of the fissures suggesting a mild component of edema.   No large consolidation or effusion.   Signed by: Parminder Fabian 12/13/2024 2:35 PM Dictation workstation:   [id number]            Assessment/Plan   ASSESSMENT & PLAN:  Principle Problem: Influenza A     #Thick, elongated nails  #T2DM    - Patient was seen and evaluated; all findings were discussed and all questions were answered to patient's satisfaction.  - Charts, labs, vitals and imaging all reviewed.   -Nails 1-5 were debrided without incident  -Podiatry will sign off.  Thank you for the consult    Patient evaluated with attending Dr. Odalis KAMARA    Case to be discussed with attending, A&P above reflects a tentative plan. Please await for the final signature from the attending physician on service.    Jason Bethea DPM, RENA PGY-1  Podiatric Medicine & Surgery

## 2025-01-05 NOTE — PROGRESS NOTES
"Genet Wilks is a 74 y.o. female on day 5 of admission presenting with Influenza A.    Subjective   No events overnight.  Patient seen and examined at bedside.  She remains on supplemental O2.  She is still having significant wheezing and coughing.       Objective     Physical Exam  Constitutional:       Appearance: Normal appearance.   HENT:      Head: Normocephalic and atraumatic.      Right Ear: Tympanic membrane and ear canal normal.      Left Ear: Tympanic membrane and ear canal normal.      Mouth/Throat:      Mouth: Mucous membranes are moist.      Pharynx: Oropharynx is clear.   Eyes:      Extraocular Movements: Extraocular movements intact.      Conjunctiva/sclera: Conjunctivae normal.      Pupils: Pupils are equal, round, and reactive to light.   Cardiovascular:      Rate and Rhythm: Normal rate and regular rhythm.      Pulses: Normal pulses.      Heart sounds: Normal heart sounds.   Pulmonary:      Effort: Pulmonary effort is normal.      Breath sounds: Normal breath sounds.   Abdominal:      General: Abdomen is flat. Bowel sounds are normal.      Palpations: Abdomen is soft.   Musculoskeletal:         General: Normal range of motion.      Cervical back: Normal range of motion and neck supple.   Skin:     General: Skin is warm and dry.      Capillary Refill: Capillary refill takes 2 to 3 seconds.   Neurological:      General: No focal deficit present.      Mental Status: She is alert and oriented to person, place, and time. Mental status is at baseline.   Psychiatric:         Mood and Affect: Mood normal.         Behavior: Behavior normal.         Thought Content: Thought content normal.         Judgment: Judgment normal.         Last Recorded Vitals  Blood pressure 138/60, pulse 65, temperature 36.3 °C (97.3 °F), temperature source Temporal, resp. rate 18, height 1.575 m (5' 2\"), weight 95.3 kg (210 lb), SpO2 97%.  Intake/Output last 3 Shifts:  I/O last 3 completed shifts:  In: - (0 mL/kg)   Out: 2950 " (31 mL/kg) [Urine:2950 (0.9 mL/kg/hr)]  Weight: 95.3 kg     Relevant Results                              Assessment/Plan   Assessment & Plan  Influenza A    74-year-old female with past medical history of CHF on 2L nasal cannula as needed presented to ED for flulike symptoms found to be flu a positive.  Patient treated with Solu-Medrol, oxygen, breathing treatments in the ED and admitted to inpatient with telemetry under Dr. Hansen for further evaluation and care.     #Influenza A  #COPD exacerbation  #Hypomagnesemia  #A-fib with RVR  #Lactatemia  - Treated with oxygen, breathing treatments, Solu-Medrol and magnesium replaced in ED  - Continue above treatments  - IV fluids, trend lactate  - Vitals every 4 hours, telemetry  - Droplet precautions  - PT/OT/social work  -Continue home Coumadin, SCD for DVT PPx  - Head of bed 30 degrees, aspiration precautions     Chronic conditions  #Mitral valve replacement on Coumadin  #HTN  #HLD  #DM  #CHF on 2L nasal cannula as needed  - Continue home meds  - Cardiac diet    1/2: doing ok, montior labs, wean oxygen. Overall doing well, dc in next 24-48 hrs    1/3: continue breathing treatments, lasix one time, steroids continued, insulin added to help with sugars. Will monitr    1/4: Patient's bilirubin increased today from 1.4 up to 2.1.  She reports a history of a cholecystectomy in about 2017 due to gallstones.  She has no right upper quadrant pain.  Will monitor LFTs.  Continue bronchodilators.  Podiatry consulted for nail care.      I spent 55 minutes in the professional and overall care of this patient.      Paulo Contreras DO

## 2025-01-05 NOTE — CARE PLAN
Problem: Skin  Goal: Participates in plan/prevention/treatment measures  Outcome: Progressing  Goal: Prevent/manage excess moisture  Outcome: Progressing  Goal: Prevent/minimize sheer/friction injuries  Outcome: Progressing  Goal: Promote/optimize nutrition  Outcome: Progressing  Goal: Promote skin healing  Outcome: Progressing     Problem: Pain - Adult  Goal: Verbalizes/displays adequate comfort level or baseline comfort level  Outcome: Progressing     Problem: Safety - Adult  Goal: Free from fall injury  Outcome: Progressing     Problem: Discharge Planning  Goal: Discharge to home or other facility with appropriate resources  Outcome: Progressing     Problem: Chronic Conditions and Co-morbidities  Goal: Patient's chronic conditions and co-morbidity symptoms are monitored and maintained or improved  Outcome: Progressing     Problem: Diabetes  Goal: Increase stability of blood glucose readings by end of shift  Outcome: Progressing  Goal: Maintain electrolyte levels within acceptable range throughout shift  Outcome: Progressing  Goal: Maintain glucose levels >70mg/dl to <250mg/dl throughout shift  Outcome: Progressing  Goal: No changes in neurological exam by end of shift  Outcome: Progressing  Goal: Vital signs within normal range for age by end of shift  Outcome: Progressing     Problem: Fall/Injury  Goal: Not fall by end of shift  Outcome: Progressing  Goal: Be free from injury by end of the shift  Outcome: Progressing  Goal: Verbalize understanding of personal risk factors for fall in the hospital  Outcome: Progressing  Goal: Verbalize understanding of risk factor reduction measures to prevent injury from fall in the home  Outcome: Progressing  Goal: Use assistive devices by end of the shift  Outcome: Progressing  Goal: Pace activities to prevent fatigue by end of the shift  Outcome: Progressing

## 2025-01-06 ENCOUNTER — APPOINTMENT (OUTPATIENT)
Dept: RADIOLOGY | Facility: HOSPITAL | Age: 75
End: 2025-01-06
Payer: MEDICARE

## 2025-01-06 ENCOUNTER — APPOINTMENT (OUTPATIENT)
Dept: CARDIOLOGY | Facility: HOSPITAL | Age: 75
End: 2025-01-06
Payer: MEDICARE

## 2025-01-06 LAB
ALBUMIN SERPL BCP-MCNC: 3.3 G/DL (ref 3.4–5)
ALP SERPL-CCNC: 76 U/L (ref 33–136)
ALT SERPL W P-5'-P-CCNC: 23 U/L (ref 7–45)
ANION GAP SERPL CALC-SCNC: 10 MMOL/L (ref 10–20)
AORTIC VALVE MEAN GRADIENT: 11 MMHG
AORTIC VALVE PEAK VELOCITY: 2.31 M/S
AST SERPL W P-5'-P-CCNC: 11 U/L (ref 9–39)
AV PEAK GRADIENT: 21 MMHG
AVA (PEAK VEL): 0.81 CM2
AVA (VTI): 0.68 CM2
BILIRUB SERPL-MCNC: 1.9 MG/DL (ref 0–1.2)
BUN SERPL-MCNC: 35 MG/DL (ref 6–23)
CALCIUM SERPL-MCNC: 8.7 MG/DL (ref 8.6–10.3)
CHLORIDE SERPL-SCNC: 92 MMOL/L (ref 98–107)
CO2 SERPL-SCNC: 37 MMOL/L (ref 21–32)
CREAT SERPL-MCNC: 0.79 MG/DL (ref 0.5–1.05)
EGFRCR SERPLBLD CKD-EPI 2021: 80 ML/MIN/1.73M*2
EJECTION FRACTION APICAL 4 CHAMBER: 48.3
EJECTION FRACTION: 55 %
ERYTHROCYTE [DISTWIDTH] IN BLOOD BY AUTOMATED COUNT: 15.5 % (ref 11.5–14.5)
GLUCOSE BLD MANUAL STRIP-MCNC: 228 MG/DL (ref 74–99)
GLUCOSE BLD MANUAL STRIP-MCNC: 239 MG/DL (ref 74–99)
GLUCOSE BLD MANUAL STRIP-MCNC: 335 MG/DL (ref 74–99)
GLUCOSE BLD MANUAL STRIP-MCNC: 367 MG/DL (ref 74–99)
GLUCOSE SERPL-MCNC: 346 MG/DL (ref 74–99)
HCT VFR BLD AUTO: 37.2 % (ref 36–46)
HGB BLD-MCNC: 11.8 G/DL (ref 12–16)
INR PPP: 3.2 (ref 0.9–1.1)
LEFT ATRIUM VOLUME AREA LENGTH INDEX BSA: 43.1 ML/M2
LEFT VENTRICLE INTERNAL DIMENSION DIASTOLE: 4.5 CM (ref 3.5–6)
LEFT VENTRICULAR OUTFLOW TRACT DIAMETER: 2 CM
MCH RBC QN AUTO: 27.5 PG (ref 26–34)
MCHC RBC AUTO-ENTMCNC: 31.7 G/DL (ref 32–36)
MCV RBC AUTO: 87 FL (ref 80–100)
NRBC BLD-RTO: 0 /100 WBCS (ref 0–0)
PLATELET # BLD AUTO: 201 X10*3/UL (ref 150–450)
POTASSIUM SERPL-SCNC: 3.9 MMOL/L (ref 3.5–5.3)
PROT SERPL-MCNC: 6.2 G/DL (ref 6.4–8.2)
PROTHROMBIN TIME: 36.9 SECONDS (ref 9.8–12.8)
RBC # BLD AUTO: 4.29 X10*6/UL (ref 4–5.2)
SODIUM SERPL-SCNC: 135 MMOL/L (ref 136–145)
WBC # BLD AUTO: 12.2 X10*3/UL (ref 4.4–11.3)

## 2025-01-06 PROCEDURE — 80053 COMPREHEN METABOLIC PANEL: CPT | Performed by: INTERNAL MEDICINE

## 2025-01-06 PROCEDURE — 2500000002 HC RX 250 W HCPCS SELF ADMINISTERED DRUGS (ALT 637 FOR MEDICARE OP, ALT 636 FOR OP/ED): Performed by: EMERGENCY MEDICINE

## 2025-01-06 PROCEDURE — 99232 SBSQ HOSP IP/OBS MODERATE 35: CPT | Performed by: STUDENT IN AN ORGANIZED HEALTH CARE EDUCATION/TRAINING PROGRAM

## 2025-01-06 PROCEDURE — 71045 X-RAY EXAM CHEST 1 VIEW: CPT

## 2025-01-06 PROCEDURE — 82947 ASSAY GLUCOSE BLOOD QUANT: CPT

## 2025-01-06 PROCEDURE — 85610 PROTHROMBIN TIME: CPT | Performed by: PHYSICIAN ASSISTANT

## 2025-01-06 PROCEDURE — 1100000001 HC PRIVATE ROOM DAILY

## 2025-01-06 PROCEDURE — 2500000001 HC RX 250 WO HCPCS SELF ADMINISTERED DRUGS (ALT 637 FOR MEDICARE OP): Performed by: STUDENT IN AN ORGANIZED HEALTH CARE EDUCATION/TRAINING PROGRAM

## 2025-01-06 PROCEDURE — 2500000001 HC RX 250 WO HCPCS SELF ADMINISTERED DRUGS (ALT 637 FOR MEDICARE OP)

## 2025-01-06 PROCEDURE — 93306 TTE W/DOPPLER COMPLETE: CPT | Performed by: STUDENT IN AN ORGANIZED HEALTH CARE EDUCATION/TRAINING PROGRAM

## 2025-01-06 PROCEDURE — 71045 X-RAY EXAM CHEST 1 VIEW: CPT | Performed by: STUDENT IN AN ORGANIZED HEALTH CARE EDUCATION/TRAINING PROGRAM

## 2025-01-06 PROCEDURE — 94640 AIRWAY INHALATION TREATMENT: CPT

## 2025-01-06 PROCEDURE — 2500000002 HC RX 250 W HCPCS SELF ADMINISTERED DRUGS (ALT 637 FOR MEDICARE OP, ALT 636 FOR OP/ED): Performed by: STUDENT IN AN ORGANIZED HEALTH CARE EDUCATION/TRAINING PROGRAM

## 2025-01-06 PROCEDURE — 2500000002 HC RX 250 W HCPCS SELF ADMINISTERED DRUGS (ALT 637 FOR MEDICARE OP, ALT 636 FOR OP/ED)

## 2025-01-06 PROCEDURE — 2500000004 HC RX 250 GENERAL PHARMACY W/ HCPCS (ALT 636 FOR OP/ED): Mod: JZ

## 2025-01-06 PROCEDURE — 2500000005 HC RX 250 GENERAL PHARMACY W/O HCPCS: Performed by: EMERGENCY MEDICINE

## 2025-01-06 PROCEDURE — 93306 TTE W/DOPPLER COMPLETE: CPT

## 2025-01-06 PROCEDURE — 2500000004 HC RX 250 GENERAL PHARMACY W/ HCPCS (ALT 636 FOR OP/ED): Performed by: INTERNAL MEDICINE

## 2025-01-06 PROCEDURE — 36415 COLL VENOUS BLD VENIPUNCTURE: CPT | Performed by: PHYSICIAN ASSISTANT

## 2025-01-06 PROCEDURE — 85027 COMPLETE CBC AUTOMATED: CPT | Performed by: INTERNAL MEDICINE

## 2025-01-06 RX ADMIN — FUROSEMIDE 40 MG: 40 TABLET ORAL at 08:23

## 2025-01-06 RX ADMIN — METHYLPREDNISOLONE SODIUM SUCCINATE 40 MG: 40 INJECTION, POWDER, FOR SOLUTION INTRAMUSCULAR; INTRAVENOUS at 06:05

## 2025-01-06 RX ADMIN — INSULIN LISPRO 10 UNITS: 100 INJECTION, SOLUTION INTRAVENOUS; SUBCUTANEOUS at 08:18

## 2025-01-06 RX ADMIN — INSULIN LISPRO 10 UNITS: 100 INJECTION, SOLUTION INTRAVENOUS; SUBCUTANEOUS at 12:41

## 2025-01-06 RX ADMIN — LEVOTHYROXINE SODIUM 75 MCG: 0.07 TABLET ORAL at 06:05

## 2025-01-06 RX ADMIN — WARFARIN SODIUM 5 MG: 5 TABLET ORAL at 17:13

## 2025-01-06 RX ADMIN — METOPROLOL SUCCINATE 25 MG: 25 TABLET, EXTENDED RELEASE ORAL at 08:23

## 2025-01-06 RX ADMIN — AMLODIPINE BESYLATE 5 MG: 5 TABLET ORAL at 08:23

## 2025-01-06 RX ADMIN — IPRATROPIUM BROMIDE AND ALBUTEROL SULFATE 3 ML: .5; 3 SOLUTION RESPIRATORY (INHALATION) at 07:47

## 2025-01-06 RX ADMIN — METHYLPREDNISOLONE SODIUM SUCCINATE 40 MG: 40 INJECTION, POWDER, FOR SOLUTION INTRAMUSCULAR; INTRAVENOUS at 21:44

## 2025-01-06 RX ADMIN — INSULIN LISPRO 10 UNITS: 100 INJECTION, SOLUTION INTRAVENOUS; SUBCUTANEOUS at 17:12

## 2025-01-06 RX ADMIN — Medication 28 PERCENT: at 07:50

## 2025-01-06 RX ADMIN — GUAIFENESIN 1200 MG: 600 TABLET, EXTENDED RELEASE ORAL at 08:23

## 2025-01-06 RX ADMIN — INSULIN LISPRO 4 UNITS: 100 INJECTION, SOLUTION INTRAVENOUS; SUBCUTANEOUS at 17:10

## 2025-01-06 RX ADMIN — INSULIN LISPRO 10 UNITS: 100 INJECTION, SOLUTION INTRAVENOUS; SUBCUTANEOUS at 12:42

## 2025-01-06 RX ADMIN — ALBUTEROL SULFATE 2.5 MG: 2.5 SOLUTION RESPIRATORY (INHALATION) at 01:00

## 2025-01-06 RX ADMIN — IPRATROPIUM BROMIDE AND ALBUTEROL SULFATE 3 ML: .5; 3 SOLUTION RESPIRATORY (INHALATION) at 18:46

## 2025-01-06 RX ADMIN — GUAIFENESIN 1200 MG: 600 TABLET, EXTENDED RELEASE ORAL at 20:42

## 2025-01-06 RX ADMIN — IPRATROPIUM BROMIDE AND ALBUTEROL SULFATE 3 ML: .5; 3 SOLUTION RESPIRATORY (INHALATION) at 12:32

## 2025-01-06 RX ADMIN — METHYLPREDNISOLONE SODIUM SUCCINATE 40 MG: 40 INJECTION, POWDER, FOR SOLUTION INTRAMUSCULAR; INTRAVENOUS at 14:41

## 2025-01-06 RX ADMIN — INSULIN LISPRO 8 UNITS: 100 INJECTION, SOLUTION INTRAVENOUS; SUBCUTANEOUS at 08:15

## 2025-01-06 RX ADMIN — ASPIRIN 81 MG: 81 TABLET, COATED ORAL at 08:23

## 2025-01-06 RX ADMIN — Medication 2 L/MIN: at 20:43

## 2025-01-06 RX ADMIN — INSULIN GLARGINE 15 UNITS: 100 INJECTION, SOLUTION SUBCUTANEOUS at 20:42

## 2025-01-06 ASSESSMENT — COGNITIVE AND FUNCTIONAL STATUS - GENERAL
DAILY ACTIVITIY SCORE: 24
WALKING IN HOSPITAL ROOM: A LITTLE
MOVING TO AND FROM BED TO CHAIR: A LITTLE
MOBILITY SCORE: 20
CLIMB 3 TO 5 STEPS WITH RAILING: A LITTLE
STANDING UP FROM CHAIR USING ARMS: A LITTLE

## 2025-01-06 ASSESSMENT — PAIN SCALES - GENERAL
PAINLEVEL_OUTOF10: 0 - NO PAIN
PAINLEVEL_OUTOF10: 0 - NO PAIN

## 2025-01-06 ASSESSMENT — PAIN - FUNCTIONAL ASSESSMENT: PAIN_FUNCTIONAL_ASSESSMENT: 0-10

## 2025-01-06 NOTE — PROGRESS NOTES
01/06/25 1839   Discharge Planning   Living Arrangements Children   Support Systems Children;Family members   Assistance Needed independent   Type of Residence Private residence   Expected Discharge Disposition Home   Does the patient need discharge transport arranged? No     Per attending note, ADOD tomorrow.  Patient was requesting a new O2 concentrator for her home but forgot to ask her son which supplier she uses.  With patient's permission, I called, asked her son:  [employer].  Plan remains for patient to return home after discharge; she reported difficulty with going up and down stairs due to pain in her knees, voiced interest in [facility] for therapies.  Care Coordination team following for assistance with discharge planning as needed.  Car MCWILLIAMS [facility]

## 2025-01-06 NOTE — PROGRESS NOTES
"Carmela Wilks is a 74 y.o. female on day 6 of admission presenting with Influenza A.    Subjective   Breathign is better, donw to 2 liters in chair, encouraged ambulation today       Objective     Physical Exam  Constitutional:       Appearance: Normal appearance.   HENT:      Head: Normocephalic and atraumatic.      Right Ear: Tympanic membrane and ear canal normal.      Left Ear: Tympanic membrane and ear canal normal.      Mouth/Throat:      Mouth: Mucous membranes are moist.      Pharynx: Oropharynx is clear.   Eyes:      Extraocular Movements: Extraocular movements intact.      Conjunctiva/sclera: Conjunctivae normal.      Pupils: Pupils are equal, round, and reactive to light.   Cardiovascular:      Rate and Rhythm: Normal rate and regular rhythm.      Pulses: Normal pulses.      Heart sounds: Normal heart sounds.   Pulmonary:      Effort: Pulmonary effort is normal.      Breath sounds: Normal breath sounds.   Abdominal:      General: Abdomen is flat. Bowel sounds are normal.      Palpations: Abdomen is soft.   Musculoskeletal:         General: Normal range of motion.      Cervical back: Normal range of motion and neck supple.   Skin:     General: Skin is warm and dry.      Capillary Refill: Capillary refill takes 2 to 3 seconds.   Neurological:      General: No focal deficit present.      Mental Status: She is alert and oriented to person, place, and time. Mental status is at baseline.   Psychiatric:         Mood and Affect: Mood normal.         Behavior: Behavior normal.         Thought Content: Thought content normal.         Judgment: Judgment normal.         Last Recorded Vitals  Blood pressure 126/79, pulse 73, temperature 36.2 °C (97.2 °F), resp. rate 17, height 1.575 m (5' 2\"), weight 95.3 kg (210 lb), SpO2 96%.  Intake/Output last 3 Shifts:  I/O last 3 completed shifts:  In: 600 (6.3 mL/kg) [P.O.:600]  Out: 2200 (23.1 mL/kg) [Urine:2200 (0.6 mL/kg/hr)]  Weight: 95.3 kg     Relevant Results          "                     Assessment/Plan   Assessment & Plan  Influenza A    74-year-old female with past medical history of CHF on 2L nasal cannula as needed presented to ED for flulike symptoms found to be flu a positive.  Patient treated with Solu-Medrol, oxygen, breathing treatments in the ED and admitted to inpatient with telemetry under Dr. Hansen for further evaluation and care.     #Influenza A  #COPD exacerbation  #Hypomagnesemia  #A-fib with RVR  #Lactatemia  - Treated with oxygen, breathing treatments, Solu-Medrol and magnesium replaced in ED  - Continue above treatments  - IV fluids, trend lactate  - Vitals every 4 hours, telemetry  - Droplet precautions  - PT/OT/social work  -Continue home Coumadin, SCD for DVT PPx  - Head of bed 30 degrees, aspiration precautions     Chronic conditions  #Mitral valve replacement on Coumadin  #HTN  #HLD  #DM  #CHF on 2L nasal cannula as needed  - Continue home meds  - Cardiac diet    1/2: doing ok, montior labs, wean oxygen. Overall doing well, dc in next 24-48 hrs    1/3: continue breathing treatments, lasix one time, steroids continued, insulin added to help with sugars. Will monitr    1/4: Patient's bilirubin increased today from 1.4 up to 2.1.  She reports a history of a cholecystectomy in about 2017 due to gallstones.  She has no right upper quadrant pain.  Will monitor LFTs.  Continue bronchodilators.  Podiatry consulted for nail care.    1/5: Bilirubin relatively stable at 2.2, will defer imaging at this time and repeat labs tomorrow.  Repeat chest x-ray reviewed today.  Will give an additional dose of IV Lasix.      1/6: ambulate today, wean oxygen, suspect will need 2 liters. If better by tomorrow can be dc home      I spent 55 minutes in the professional and overall care of this patient.      Rod Hansen, DO

## 2025-01-07 LAB
ALBUMIN SERPL BCP-MCNC: 3.1 G/DL (ref 3.4–5)
ALP SERPL-CCNC: 78 U/L (ref 33–136)
ALT SERPL W P-5'-P-CCNC: 19 U/L (ref 7–45)
ANION GAP SERPL CALC-SCNC: 8 MMOL/L (ref 10–20)
AST SERPL W P-5'-P-CCNC: 11 U/L (ref 9–39)
ATRIAL RATE: 120 BPM
BILIRUB SERPL-MCNC: 1.3 MG/DL (ref 0–1.2)
BUN SERPL-MCNC: 36 MG/DL (ref 6–23)
CALCIUM SERPL-MCNC: 8.5 MG/DL (ref 8.6–10.3)
CHLORIDE SERPL-SCNC: 94 MMOL/L (ref 98–107)
CO2 SERPL-SCNC: 39 MMOL/L (ref 21–32)
CREAT SERPL-MCNC: 0.77 MG/DL (ref 0.5–1.05)
EGFRCR SERPLBLD CKD-EPI 2021: 82 ML/MIN/1.73M*2
ERYTHROCYTE [DISTWIDTH] IN BLOOD BY AUTOMATED COUNT: 15.3 % (ref 11.5–14.5)
GLUCOSE BLD MANUAL STRIP-MCNC: 236 MG/DL (ref 74–99)
GLUCOSE BLD MANUAL STRIP-MCNC: 285 MG/DL (ref 74–99)
GLUCOSE BLD MANUAL STRIP-MCNC: 339 MG/DL (ref 74–99)
GLUCOSE BLD MANUAL STRIP-MCNC: 415 MG/DL (ref 74–99)
GLUCOSE SERPL-MCNC: 373 MG/DL (ref 74–99)
HCT VFR BLD AUTO: 37.6 % (ref 36–46)
HGB BLD-MCNC: 11.6 G/DL (ref 12–16)
INR PPP: 4.6 (ref 0.9–1.1)
MCH RBC QN AUTO: 27.1 PG (ref 26–34)
MCHC RBC AUTO-ENTMCNC: 30.9 G/DL (ref 32–36)
MCV RBC AUTO: 88 FL (ref 80–100)
NRBC BLD-RTO: 0 /100 WBCS (ref 0–0)
PLATELET # BLD AUTO: 222 X10*3/UL (ref 150–450)
POTASSIUM SERPL-SCNC: 4.1 MMOL/L (ref 3.5–5.3)
PROT SERPL-MCNC: 6.4 G/DL (ref 6.4–8.2)
PROTHROMBIN TIME: 52.8 SECONDS (ref 9.8–12.8)
Q ONSET: 223 MS
QRS COUNT: 20 BEATS
QRS DURATION: 104 MS
QT INTERVAL: 336 MS
QTC CALCULATION(BAZETT): 474 MS
QTC FREDERICIA: 423 MS
R AXIS: 91 DEGREES
RBC # BLD AUTO: 4.28 X10*6/UL (ref 4–5.2)
SODIUM SERPL-SCNC: 137 MMOL/L (ref 136–145)
T AXIS: -12 DEGREES
T OFFSET: 391 MS
VENTRICULAR RATE: 120 BPM
WBC # BLD AUTO: 9.1 X10*3/UL (ref 4.4–11.3)

## 2025-01-07 PROCEDURE — 2500000004 HC RX 250 GENERAL PHARMACY W/ HCPCS (ALT 636 FOR OP/ED): Mod: JZ

## 2025-01-07 PROCEDURE — 2500000002 HC RX 250 W HCPCS SELF ADMINISTERED DRUGS (ALT 637 FOR MEDICARE OP, ALT 636 FOR OP/ED): Performed by: STUDENT IN AN ORGANIZED HEALTH CARE EDUCATION/TRAINING PROGRAM

## 2025-01-07 PROCEDURE — 36415 COLL VENOUS BLD VENIPUNCTURE: CPT | Performed by: INTERNAL MEDICINE

## 2025-01-07 PROCEDURE — 2500000001 HC RX 250 WO HCPCS SELF ADMINISTERED DRUGS (ALT 637 FOR MEDICARE OP)

## 2025-01-07 PROCEDURE — 82947 ASSAY GLUCOSE BLOOD QUANT: CPT

## 2025-01-07 PROCEDURE — 94760 N-INVAS EAR/PLS OXIMETRY 1: CPT

## 2025-01-07 PROCEDURE — 84075 ASSAY ALKALINE PHOSPHATASE: CPT | Performed by: INTERNAL MEDICINE

## 2025-01-07 PROCEDURE — 2500000002 HC RX 250 W HCPCS SELF ADMINISTERED DRUGS (ALT 637 FOR MEDICARE OP, ALT 636 FOR OP/ED): Performed by: EMERGENCY MEDICINE

## 2025-01-07 PROCEDURE — 2500000005 HC RX 250 GENERAL PHARMACY W/O HCPCS: Performed by: EMERGENCY MEDICINE

## 2025-01-07 PROCEDURE — 2500000004 HC RX 250 GENERAL PHARMACY W/ HCPCS (ALT 636 FOR OP/ED): Performed by: INTERNAL MEDICINE

## 2025-01-07 PROCEDURE — 2500000001 HC RX 250 WO HCPCS SELF ADMINISTERED DRUGS (ALT 637 FOR MEDICARE OP): Performed by: STUDENT IN AN ORGANIZED HEALTH CARE EDUCATION/TRAINING PROGRAM

## 2025-01-07 PROCEDURE — 2500000002 HC RX 250 W HCPCS SELF ADMINISTERED DRUGS (ALT 637 FOR MEDICARE OP, ALT 636 FOR OP/ED)

## 2025-01-07 PROCEDURE — 94640 AIRWAY INHALATION TREATMENT: CPT

## 2025-01-07 PROCEDURE — 85610 PROTHROMBIN TIME: CPT | Performed by: PHYSICIAN ASSISTANT

## 2025-01-07 PROCEDURE — 99232 SBSQ HOSP IP/OBS MODERATE 35: CPT | Performed by: STUDENT IN AN ORGANIZED HEALTH CARE EDUCATION/TRAINING PROGRAM

## 2025-01-07 PROCEDURE — 85027 COMPLETE CBC AUTOMATED: CPT | Performed by: INTERNAL MEDICINE

## 2025-01-07 PROCEDURE — 9420000001 HC RT PATIENT EDUCATION 5 MIN

## 2025-01-07 PROCEDURE — 1100000001 HC PRIVATE ROOM DAILY

## 2025-01-07 RX ADMIN — METHYLPREDNISOLONE SODIUM SUCCINATE 40 MG: 40 INJECTION, POWDER, FOR SOLUTION INTRAMUSCULAR; INTRAVENOUS at 06:22

## 2025-01-07 RX ADMIN — LEVOTHYROXINE SODIUM 75 MCG: 0.07 TABLET ORAL at 06:22

## 2025-01-07 RX ADMIN — IPRATROPIUM BROMIDE AND ALBUTEROL SULFATE 3 ML: .5; 3 SOLUTION RESPIRATORY (INHALATION) at 11:16

## 2025-01-07 RX ADMIN — Medication 2 L/MIN: at 20:03

## 2025-01-07 RX ADMIN — GUAIFENESIN 1200 MG: 600 TABLET, EXTENDED RELEASE ORAL at 20:03

## 2025-01-07 RX ADMIN — METOPROLOL SUCCINATE 25 MG: 25 TABLET, EXTENDED RELEASE ORAL at 09:41

## 2025-01-07 RX ADMIN — INSULIN LISPRO 10 UNITS: 100 INJECTION, SOLUTION INTRAVENOUS; SUBCUTANEOUS at 12:39

## 2025-01-07 RX ADMIN — INSULIN LISPRO 10 UNITS: 100 INJECTION, SOLUTION INTRAVENOUS; SUBCUTANEOUS at 12:41

## 2025-01-07 RX ADMIN — IPRATROPIUM BROMIDE AND ALBUTEROL SULFATE 3 ML: .5; 3 SOLUTION RESPIRATORY (INHALATION) at 18:31

## 2025-01-07 RX ADMIN — INSULIN LISPRO 10 UNITS: 100 INJECTION, SOLUTION INTRAVENOUS; SUBCUTANEOUS at 17:07

## 2025-01-07 RX ADMIN — INSULIN LISPRO 6 UNITS: 100 INJECTION, SOLUTION INTRAVENOUS; SUBCUTANEOUS at 17:09

## 2025-01-07 RX ADMIN — ALBUTEROL SULFATE 2.5 MG: 2.5 SOLUTION RESPIRATORY (INHALATION) at 01:23

## 2025-01-07 RX ADMIN — GUAIFENESIN 1200 MG: 600 TABLET, EXTENDED RELEASE ORAL at 09:41

## 2025-01-07 RX ADMIN — FUROSEMIDE 40 MG: 40 TABLET ORAL at 09:41

## 2025-01-07 RX ADMIN — INSULIN LISPRO 8 UNITS: 100 INJECTION, SOLUTION INTRAVENOUS; SUBCUTANEOUS at 08:13

## 2025-01-07 RX ADMIN — INSULIN LISPRO 10 UNITS: 100 INJECTION, SOLUTION INTRAVENOUS; SUBCUTANEOUS at 08:13

## 2025-01-07 RX ADMIN — INSULIN GLARGINE 15 UNITS: 100 INJECTION, SOLUTION SUBCUTANEOUS at 20:16

## 2025-01-07 RX ADMIN — IPRATROPIUM BROMIDE AND ALBUTEROL SULFATE 3 ML: .5; 3 SOLUTION RESPIRATORY (INHALATION) at 06:23

## 2025-01-07 RX ADMIN — AMLODIPINE BESYLATE 5 MG: 5 TABLET ORAL at 09:41

## 2025-01-07 RX ADMIN — Medication 28 PERCENT: at 06:23

## 2025-01-07 RX ADMIN — ASPIRIN 81 MG: 81 TABLET, COATED ORAL at 09:41

## 2025-01-07 ASSESSMENT — COGNITIVE AND FUNCTIONAL STATUS - GENERAL
MOVING TO AND FROM BED TO CHAIR: A LITTLE
STANDING UP FROM CHAIR USING ARMS: A LITTLE
MOBILITY SCORE: 20
DAILY ACTIVITIY SCORE: 24
WALKING IN HOSPITAL ROOM: A LITTLE
CLIMB 3 TO 5 STEPS WITH RAILING: A LITTLE

## 2025-01-07 ASSESSMENT — PAIN SCALES - GENERAL
PAINLEVEL_OUTOF10: 0 - NO PAIN
PAINLEVEL_OUTOF10: 0 - NO PAIN

## 2025-01-07 NOTE — CARE PLAN
Problem: Skin  Goal: Prevent/manage excess moisture  Outcome: Progressing  Flowsheets (Taken 1/6/2025 2247)  Prevent/manage excess moisture: Cleanse incontinence/protect with barrier cream  Problem: Pain - Adult  Goal: Verbalizes/displays adequate comfort level or baseline comfort level  Outcome: Progressing     Problem: Safety - Adult  Goal: Free from fall injury  Outcome: Progressing     Problem: Discharge Planning  Goal: Discharge to home or other facility with appropriate resources  Outcome: Progressing     Problem: Chronic Conditions and Co-morbidities  Goal: Patient's chronic conditions and co-morbidity symptoms are monitored and maintained or improved  Outcome: Progressing

## 2025-01-07 NOTE — PROGRESS NOTES
"Carmela Wilks is a 74 y.o. female on day 7 of admission presenting with Influenza A.    Subjective   Breathign is better, donw to 2 liters in chair, encouraged ambulation today       Objective     Physical Exam  Constitutional:       Appearance: Normal appearance.   HENT:      Head: Normocephalic and atraumatic.      Right Ear: Tympanic membrane and ear canal normal.      Left Ear: Tympanic membrane and ear canal normal.      Mouth/Throat:      Mouth: Mucous membranes are moist.      Pharynx: Oropharynx is clear.   Eyes:      Extraocular Movements: Extraocular movements intact.      Conjunctiva/sclera: Conjunctivae normal.      Pupils: Pupils are equal, round, and reactive to light.   Cardiovascular:      Rate and Rhythm: Normal rate and regular rhythm.      Pulses: Normal pulses.      Heart sounds: Normal heart sounds.   Pulmonary:      Effort: Pulmonary effort is normal.      Breath sounds: Normal breath sounds.   Abdominal:      General: Abdomen is flat. Bowel sounds are normal.      Palpations: Abdomen is soft.   Musculoskeletal:         General: Normal range of motion.      Cervical back: Normal range of motion and neck supple.   Skin:     General: Skin is warm and dry.      Capillary Refill: Capillary refill takes 2 to 3 seconds.   Neurological:      General: No focal deficit present.      Mental Status: She is alert and oriented to person, place, and time. Mental status is at baseline.   Psychiatric:         Mood and Affect: Mood normal.         Behavior: Behavior normal.         Thought Content: Thought content normal.         Judgment: Judgment normal.         Last Recorded Vitals  Blood pressure 134/59, pulse 75, temperature 35.5 °C (95.9 °F), resp. rate 17, height 1.575 m (5' 2\"), weight 95.3 kg (210 lb), SpO2 97%.  Intake/Output last 3 Shifts:  I/O last 3 completed shifts:  In: - (0 mL/kg)   Out: 1500 (15.7 mL/kg) [Urine:1500 (0.4 mL/kg/hr)]  Weight: 95.3 kg     Relevant Results                        "       Assessment/Plan   Assessment & Plan  Influenza A    74-year-old female with past medical history of CHF on 2L nasal cannula as needed presented to ED for flulike symptoms found to be flu a positive.  Patient treated with Solu-Medrol, oxygen, breathing treatments in the ED and admitted to inpatient with telemetry under Dr. Hansen for further evaluation and care.     #Influenza A  #COPD exacerbation  #Hypomagnesemia  #A-fib with RVR  #Lactatemia  - Treated with oxygen, breathing treatments, Solu-Medrol and magnesium replaced in ED  - Continue above treatments  - IV fluids, trend lactate  - Vitals every 4 hours, telemetry  - Droplet precautions  - PT/OT/social work  -Continue home Coumadin, SCD for DVT PPx  - Head of bed 30 degrees, aspiration precautions     Chronic conditions  #Mitral valve replacement on Coumadin  #HTN  #HLD  #DM  #CHF on 2L nasal cannula as needed  - Continue home meds  - Cardiac diet    1/2: doing ok, montior labs, wean oxygen. Overall doing well, dc in next 24-48 hrs    1/3: continue breathing treatments, lasix one time, steroids continued, insulin added to help with sugars. Will monitr    1/4: Patient's bilirubin increased today from 1.4 up to 2.1.  She reports a history of a cholecystectomy in about 2017 due to gallstones.  She has no right upper quadrant pain.  Will monitor LFTs.  Continue bronchodilators.  Podiatry consulted for nail care.    1/5: Bilirubin relatively stable at 2.2, will defer imaging at this time and repeat labs tomorrow.  Repeat chest x-ray reviewed today.  Will give an additional dose of IV Lasix.      1/6: ambulate today, wean oxygen, suspect will need 2 liters. If better by tomorrow can be dc home    1/7:  states feelign well however no one is home to get her and will need to go tomorrow      I spent 55 minutes in the professional and overall care of this patient.      Rod Hansen, DO

## 2025-01-07 NOTE — NURSING NOTE
Pulmonary Disease Navigator Documentation:    Comments:  Met with patient to re-enforce education, discuss questions and address any concerns regarding discharge.  Patient's home oxygen DME, [employer], was contacted on behalf of patient.  Patient stated her home concentrator is from 2001 and she does not believe it is functioning correctly.  DME to contact patient and schedule home visit.  During education, patient was placed on room air and SPO2 was 92% at rest.  Patient denies any other home-going respiratory needs at this time.  Will continue to follow.

## 2025-01-07 NOTE — PROGRESS NOTES
1/7/2025  Pt on 2L o2.  Spoke with Mary Ann from RT- in with pt and she was checking pt saturation. Stated pt sat was not dropping and pt will not need additional o2 for day time/ (Currently has 2l at HS). Verenice stated she spoke with Dina from DME regarding getting pt a new concentrator and Dina with follow up with that.   Ct Team will continue to follow.   Mary Little Rn [facility]

## 2025-01-08 ENCOUNTER — TELEPHONE (OUTPATIENT)
Dept: PHARMACY | Facility: CLINIC | Age: 75
End: 2025-01-08
Payer: MEDICARE

## 2025-01-08 VITALS
SYSTOLIC BLOOD PRESSURE: 139 MMHG | BODY MASS INDEX: 38.64 KG/M2 | HEART RATE: 72 BPM | TEMPERATURE: 95.4 F | DIASTOLIC BLOOD PRESSURE: 62 MMHG | OXYGEN SATURATION: 96 % | RESPIRATION RATE: 20 BRPM | WEIGHT: 210 LBS | HEIGHT: 62 IN

## 2025-01-08 LAB
ALBUMIN SERPL BCP-MCNC: 2.9 G/DL (ref 3.4–5)
ALP SERPL-CCNC: 66 U/L (ref 33–136)
ALT SERPL W P-5'-P-CCNC: 15 U/L (ref 7–45)
ANION GAP SERPL CALC-SCNC: 9 MMOL/L (ref 10–20)
AST SERPL W P-5'-P-CCNC: 13 U/L (ref 9–39)
BILIRUB SERPL-MCNC: 1.2 MG/DL (ref 0–1.2)
BUN SERPL-MCNC: 29 MG/DL (ref 6–23)
CALCIUM SERPL-MCNC: 8.6 MG/DL (ref 8.6–10.3)
CHLORIDE SERPL-SCNC: 96 MMOL/L (ref 98–107)
CO2 SERPL-SCNC: 39 MMOL/L (ref 21–32)
CREAT SERPL-MCNC: 0.73 MG/DL (ref 0.5–1.05)
EGFRCR SERPLBLD CKD-EPI 2021: 88 ML/MIN/1.73M*2
ERYTHROCYTE [DISTWIDTH] IN BLOOD BY AUTOMATED COUNT: 15.1 % (ref 11.5–14.5)
GLUCOSE BLD MANUAL STRIP-MCNC: 149 MG/DL (ref 74–99)
GLUCOSE BLD MANUAL STRIP-MCNC: 201 MG/DL (ref 74–99)
GLUCOSE SERPL-MCNC: 155 MG/DL (ref 74–99)
HCT VFR BLD AUTO: 39.2 % (ref 36–46)
HGB BLD-MCNC: 11.9 G/DL (ref 12–16)
MCH RBC QN AUTO: 27.2 PG (ref 26–34)
MCHC RBC AUTO-ENTMCNC: 30.4 G/DL (ref 32–36)
MCV RBC AUTO: 90 FL (ref 80–100)
NRBC BLD-RTO: 0 /100 WBCS (ref 0–0)
PLATELET # BLD AUTO: 279 X10*3/UL (ref 150–450)
POTASSIUM SERPL-SCNC: 4 MMOL/L (ref 3.5–5.3)
PROT SERPL-MCNC: 5.9 G/DL (ref 6.4–8.2)
Q ONSET: 225 MS
QRS COUNT: 18 BEATS
QRS DURATION: 102 MS
QT INTERVAL: 322 MS
QTC CALCULATION(BAZETT): 435 MS
QTC FREDERICIA: 394 MS
R AXIS: 96 DEGREES
RBC # BLD AUTO: 4.38 X10*6/UL (ref 4–5.2)
SODIUM SERPL-SCNC: 140 MMOL/L (ref 136–145)
T AXIS: 0 DEGREES
T OFFSET: 386 MS
VENTRICULAR RATE: 110 BPM
WBC # BLD AUTO: 9.2 X10*3/UL (ref 4.4–11.3)

## 2025-01-08 PROCEDURE — 2500000004 HC RX 250 GENERAL PHARMACY W/ HCPCS (ALT 636 FOR OP/ED): Performed by: INTERNAL MEDICINE

## 2025-01-08 PROCEDURE — 99238 HOSP IP/OBS DSCHRG MGMT 30/<: CPT | Performed by: STUDENT IN AN ORGANIZED HEALTH CARE EDUCATION/TRAINING PROGRAM

## 2025-01-08 PROCEDURE — 82947 ASSAY GLUCOSE BLOOD QUANT: CPT

## 2025-01-08 PROCEDURE — 85027 COMPLETE CBC AUTOMATED: CPT | Performed by: INTERNAL MEDICINE

## 2025-01-08 PROCEDURE — 2500000002 HC RX 250 W HCPCS SELF ADMINISTERED DRUGS (ALT 637 FOR MEDICARE OP, ALT 636 FOR OP/ED): Performed by: EMERGENCY MEDICINE

## 2025-01-08 PROCEDURE — 36415 COLL VENOUS BLD VENIPUNCTURE: CPT | Performed by: INTERNAL MEDICINE

## 2025-01-08 PROCEDURE — 94640 AIRWAY INHALATION TREATMENT: CPT

## 2025-01-08 PROCEDURE — 84075 ASSAY ALKALINE PHOSPHATASE: CPT | Performed by: INTERNAL MEDICINE

## 2025-01-08 PROCEDURE — 2500000005 HC RX 250 GENERAL PHARMACY W/O HCPCS: Performed by: EMERGENCY MEDICINE

## 2025-01-08 PROCEDURE — 2500000001 HC RX 250 WO HCPCS SELF ADMINISTERED DRUGS (ALT 637 FOR MEDICARE OP): Performed by: STUDENT IN AN ORGANIZED HEALTH CARE EDUCATION/TRAINING PROGRAM

## 2025-01-08 PROCEDURE — 2500000001 HC RX 250 WO HCPCS SELF ADMINISTERED DRUGS (ALT 637 FOR MEDICARE OP)

## 2025-01-08 PROCEDURE — 2500000002 HC RX 250 W HCPCS SELF ADMINISTERED DRUGS (ALT 637 FOR MEDICARE OP, ALT 636 FOR OP/ED)

## 2025-01-08 PROCEDURE — 2500000002 HC RX 250 W HCPCS SELF ADMINISTERED DRUGS (ALT 637 FOR MEDICARE OP, ALT 636 FOR OP/ED): Performed by: STUDENT IN AN ORGANIZED HEALTH CARE EDUCATION/TRAINING PROGRAM

## 2025-01-08 RX ORDER — BENZONATATE 100 MG/1
100 CAPSULE ORAL 3 TIMES DAILY PRN
Qty: 20 CAPSULE | Refills: 0 | Status: SHIPPED | OUTPATIENT
Start: 2025-01-08

## 2025-01-08 RX ADMIN — ALBUTEROL SULFATE 2.5 MG: 2.5 SOLUTION RESPIRATORY (INHALATION) at 02:29

## 2025-01-08 RX ADMIN — FUROSEMIDE 40 MG: 40 TABLET ORAL at 09:22

## 2025-01-08 RX ADMIN — INSULIN LISPRO 10 UNITS: 100 INJECTION, SOLUTION INTRAVENOUS; SUBCUTANEOUS at 09:22

## 2025-01-08 RX ADMIN — LEVOTHYROXINE SODIUM 75 MCG: 0.07 TABLET ORAL at 06:25

## 2025-01-08 RX ADMIN — Medication 28 PERCENT: at 06:21

## 2025-01-08 RX ADMIN — INSULIN LISPRO 10 UNITS: 100 INJECTION, SOLUTION INTRAVENOUS; SUBCUTANEOUS at 12:37

## 2025-01-08 RX ADMIN — INSULIN LISPRO 4 UNITS: 100 INJECTION, SOLUTION INTRAVENOUS; SUBCUTANEOUS at 12:37

## 2025-01-08 RX ADMIN — IPRATROPIUM BROMIDE AND ALBUTEROL SULFATE 3 ML: .5; 3 SOLUTION RESPIRATORY (INHALATION) at 06:19

## 2025-01-08 RX ADMIN — GUAIFENESIN 1200 MG: 600 TABLET, EXTENDED RELEASE ORAL at 09:22

## 2025-01-08 RX ADMIN — METOPROLOL SUCCINATE 25 MG: 25 TABLET, EXTENDED RELEASE ORAL at 09:22

## 2025-01-08 RX ADMIN — ASPIRIN 81 MG: 81 TABLET, COATED ORAL at 09:22

## 2025-01-08 RX ADMIN — AMLODIPINE BESYLATE 5 MG: 5 TABLET ORAL at 09:22

## 2025-01-08 ASSESSMENT — PAIN SCALES - GENERAL: PAINLEVEL_OUTOF10: 0 - NO PAIN

## 2025-01-08 ASSESSMENT — COGNITIVE AND FUNCTIONAL STATUS - GENERAL
DAILY ACTIVITIY SCORE: 22
CLIMB 3 TO 5 STEPS WITH RAILING: A LITTLE
HELP NEEDED FOR BATHING: A LITTLE
DRESSING REGULAR LOWER BODY CLOTHING: A LITTLE
WALKING IN HOSPITAL ROOM: A LITTLE
MOBILITY SCORE: 22

## 2025-01-08 NOTE — TELEPHONE ENCOUNTER
Pt calls.  Was in hospital 7 days for influenza.  Was on abx and steroids in hospital, but nothing at discharge.  Inr was 4.6 yesterday and they told her not to take any warfarin.  I told her to hold her dose again today (wed) and start back up tomorrow.  Js her to come for a check in 1 week.  Pt says she is feeling better and eating ok.

## 2025-01-08 NOTE — DISCHARGE SUMMARY
Discharge Diagnosis  Influenza A    Issues Requiring Follow-Up  flu    Test Results Pending At Discharge  Pending Labs       No current pending labs.            Hospital Course   74-year-old female with past medical history of CHF on 2L nasal cannula as needed presented to ED for flulike symptoms found to be flu a positive.  Patient treated with Solu-Medrol, oxygen, breathing treatments in the ED and admitted to inpatient with telemetry under Dr. Hansen for further evaluation and care.     #Influenza A  #COPD exacerbation  #Hypomagnesemia  #A-fib with RVR  #Lactatemia  - Treated with oxygen, breathing treatments, Solu-Medrol and magnesium replaced in ED  - Continue above treatments  - IV fluids, trend lactate  - Vitals every 4 hours, telemetry  - Droplet precautions  - PT/OT/social work  -Continue home Coumadin, SCD for DVT PPx  - Head of bed 30 degrees, aspiration precautions     Chronic conditions  #Mitral valve replacement on Coumadin  #HTN  #HLD  #DM  #CHF on 2L nasal cannula as needed  - Continue home meds  - Cardiac diet     1/2: doing ok, montior labs, wean oxygen. Overall doing well, dc in next 24-48 hrs     1/3: continue breathing treatments, lasix one time, steroids continued, insulin added to help with sugars. Will monitr     1/4: Patient's bilirubin increased today from 1.4 up to 2.1.  She reports a history of a cholecystectomy in about 2017 due to gallstones.  She has no right upper quadrant pain.  Will monitor LFTs.  Continue bronchodilators.  Podiatry consulted for nail care.     1/5: Bilirubin relatively stable at 2.2, will defer imaging at this time and repeat labs tomorrow.  Repeat chest x-ray reviewed today.  Will give an additional dose of IV Lasix.       1/6: ambulate today, wean oxygen, suspect will need 2 liters. If better by tomorrow can be dc home     1/7:  states feelign well however no one is home to get her and will need to go tomorrow       Pertinent Physical Exam At Time of  Discharge  Physical Exam  Constitutional:       Appearance: Normal appearance.   HENT:      Head: Normocephalic and atraumatic.      Right Ear: Tympanic membrane and ear canal normal.      Left Ear: Tympanic membrane and ear canal normal.      Mouth/Throat:      Mouth: Mucous membranes are moist.      Pharynx: Oropharynx is clear.   Eyes:      Extraocular Movements: Extraocular movements intact.      Conjunctiva/sclera: Conjunctivae normal.      Pupils: Pupils are equal, round, and reactive to light.   Cardiovascular:      Rate and Rhythm: Normal rate and regular rhythm.      Pulses: Normal pulses.      Heart sounds: Normal heart sounds.   Pulmonary:      Effort: Pulmonary effort is normal.      Breath sounds: Normal breath sounds.   Abdominal:      General: Abdomen is flat. Bowel sounds are normal.      Palpations: Abdomen is soft.   Musculoskeletal:         General: Normal range of motion.      Cervical back: Normal range of motion and neck supple.   Skin:     General: Skin is warm and dry.      Capillary Refill: Capillary refill takes 2 to 3 seconds.   Neurological:      General: No focal deficit present.      Mental Status: She is alert and oriented to person, place, and time. Mental status is at baseline.   Psychiatric:         Mood and Affect: Mood normal.         Behavior: Behavior normal.         Thought Content: Thought content normal.         Judgment: Judgment normal.         Home Medications     Medication List      START taking these medications     benzonatate 100 mg capsule; Commonly known as: Tessalon   metoprolol succinate XL 25 mg 24 hr tablet; Commonly known as: Toprol-XL     CHANGE how you take these medications     * albuterol 90 mcg/actuation inhaler   * albuterol 0.63 mg/3 mL nebulizer solution; Take 3 mL (0.63 mg) by   nebulization 3 times a day.   glimepiride 2 mg tablet; Commonly known as: Amaryl; TAKE 1 TABLET BY   MOUTH ONCE DAILY AS DIRECTED; What changed: when to take this, additional    instructions   levothyroxine 75 mcg tablet; Commonly known as: Synthroid, Levoxyl; TAKE   1 TABLET BY MOUTH ONCE DAILY AS DIRECTED; What changed: when to take this,   additional instructions  * This list has 2 medication(s) that are the same as other medications   prescribed for you. Read the directions carefully, and ask your doctor or   other care provider to review them with you.     CONTINUE taking these medications     amLODIPine 5 mg tablet; Commonly known as: Norvasc; Take 1 tablet by   mouth once daily   aspirin 81 mg EC tablet   atorvastatin 20 mg tablet; Commonly known as: Lipitor; TAKE 1 TABLET BY   MOUTH AT BEDTIME   cholecalciferol 50 MCG (2000 UT) tablet; Commonly known as: Vitamin D-3   furosemide 40 mg tablet; Commonly known as: Lasix   OneTouch Delica Plus Lancet 33 gauge misc; Generic drug: lancets; USE 1   TO CHECK GLUCOSE ONCE DAILY   OneTouch Ultra Test strip; Generic drug: blood sugar diagnostic; USE 1   STRIP TO CHECK GLUCOSE ONCE DAILY   oxygen gas therapy; Commonly known as: O2   warfarin 5 mg tablet; Commonly known as: Coumadin; Take as directed. If   you are unsure how to take this medication, talk to your nurse or doctor.;   Original instructions: Take 5 mg (1 tablet) every Monday and 2.5 mg (1/2   tablet) all other days or as directed by the anticoagulation clinic.     STOP taking these medications     gabapentin 100 mg capsule; Commonly known as: Neurontin       Outpatient Follow-Up  Future Appointments   Date Time Provider Department Center   1/15/2025  1:00 PM PAR MAC 3 ECHO ROOM 1 [id number] [facility] [id number]   1/15/2025  2:15 PM Lul Day MD [id number] [place]   3/10/2025  2:00 PM DO Veronica [id number] [place]       Rod Hansen DO

## 2025-01-09 ENCOUNTER — PATIENT OUTREACH (OUTPATIENT)
Dept: PRIMARY CARE | Facility: CLINIC | Age: 75
End: 2025-01-09
Payer: MEDICARE

## 2025-01-09 NOTE — DOCUMENTATION CLARIFICATION NOTE
"    PATIENT:               MELINA HATFIELD  ACCT #:                  [id number]  MRN:                       [id number]  :                       1950  ADMIT DATE:       2024 12:36 PM  DISCH DATE:        2025 1:09 PM  RESPONDING PROVIDER #:        [id number]          PROVIDER RESPONSE TEXT:    Viral Sepsis with cardiac organ dysfunction of acute myocardial injury    CDI QUERY TEXT:    Clarification        Instruction:  Based on your assessment of the patient and the clinical information, please provide the requested documentation by clicking on the appropriate radio button and enter any additional information if prompted.    Question: Is there a diagnosis indicative of a patient meeting SIRS criteria and with organ dysfunction in the setting of COPD exac. and Influenza A    When answering this query, please exercise your independent professional judgment. The fact that a question is being asked, does not imply that any particular answer is desired or expected.    The patient's clinical indicators include:  Clinical Information: 73 yo male admitted with influenza A and COPD exac.    Clinical Indicators:   Vital signs: T38.7  R24 /79  93% 1L   Lactate 3.0 Trop 23,27  Influenza A positive T bilirubin 2.6   chest xray:\" Impression:  1.  No active cardiopulmonary disease.  There has not been  significant interval change from the prior exam.\"  H+P:\"   Per radiology, there is no acute cardiopulmonary process on imaging. Flu positive.Patient out of the window for Tamiflu initiation. She was treated with fluids, breathing treatments, Solu-Medrol   note Dr Day :\" Mildly elevated Troponin from demand ischemia\"    Treatment:  LR 1000 ml IV bolus  Duoneb, Mucinex, Solumedrol  Aspirin, Lipitor    Risk Factors: COPD exac, Influenza A,  Demand ischemia, HTN urgency , HTN, Diastolic HF  Options provided:  -- Viral Sepsis with cardiac organ dysfunction of acute myocardial injury  -- Viral " Sepsis with hepatic organ dysfunction of hyperbilirubinemia  -- Viral Sepsis with multi-system organ dysfunction of hyperbilirubinemia and acute myocardial injury  -- Patient treated for COPD exac and Influenza A without Sepsis  -- Other - I will add my own diagnosis  -- Refer to Clinical Documentation Reviewer    Query created by: Marlena Selby on 1/7/2025 3:18 PM      Electronically signed by:  BJORN GEORGES DO 1/9/2025 1:45 PM

## 2025-01-09 NOTE — DOCUMENTATION CLARIFICATION NOTE
"    PATIENT:               MELINA HATFIELD  ACCT #:                  [id number]  MRN:                       [id number]  :                       1950  ADMIT DATE:       2024 12:36 PM  DISCH DATE:        2025 1:09 PM  RESPONDING PROVIDER #:        [id number]          PROVIDER RESPONSE TEXT:    Chronic Diastolic Congestive Heart Failure    CDI QUERY TEXT:    Clarification        Instruction:    Based on your assessment of the patient and the clinical information, please provide the requested documentation by clicking on the appropriate radio button and enter any additional information if prompted.    Question: Please further clarify the type and acuity of congestive heart failure    When answering this query, please exercise your independent professional judgment. The fact that a question is being asked, does not imply that any particular answer is desired or expected.    The patient's clinical indicators include:  Clinical Information: 75 yo male admitted with influenza A and COPD exac.    Clinical Indicators:   Vital signs: T38.7  R24 /79  93% 1L      chest xray:? Impression:  1.  No active cardiopulmonary disease.  There has not been  significant interval change from the prior exam.?  1/2 Cardiology consult:? Acute influenza now with pneumonia possible mild HFpEF?  1/3 chest xray:? Impression:  Pulmonary edema, slightly progressed from 2025. ?   chest xray:? Impression:  1.  Cardiomegaly. Vascular congestion and interstitial edema. Small  bilateral pleural effusions. Bibasilar airspace opacities, may be  secondary to atelectasis or pneumonia. ?   Echo:\" 1.Left ventricular ejection fraction is low normal, calculated by Cain's biplane at 55%.  2.Left ventricular diastolic filling is indeterminate.  3.The left atrium is moderately dilated.  4.There is normal right ventricular global systolic function.  5.Mild aortic valve stenosis.  6.Moderate aortic valve " "regurgitation.\"    Treatment:  Lasix 40 mg IV daily prn swelling 12/31-1/1  Lasix 20 mg IV once 1/3  Lasix 40 mg po daily 1/3  Lasix 20 mg IV once  1/5    Risk Factors: COPD exac, Influenza A,  Demand ischemia, HTN urgency , HTN, Diastolic HF  Options provided:  -- Acute on Chronic Diastolic Congestive Heart Failure  -- Chronic Diastolic Congestive Heart Failure  -- Other - I will add my own diagnosis  -- Refer to Clinical Documentation Reviewer    Query created by: Marlena Selby on 1/7/2025 3:23 PM      Electronically signed by:  BJORN GEORGES DO 1/9/2025 1:45 PM          "

## 2025-01-09 NOTE — PROGRESS NOTES
Discharge Facility: [facility]  Discharge Diagnosis: [place] 1.1 1.8 Influenza A; #COPD exacerbation  #Hypomagnesemia  #A-fib with RVR  #Lactatemia   Admission Date:1.9.25  Discharge Date: 1.8.25    PCP Appointment Date:Messaged office as follow up appt was not available within 14 days    Specialist Appointment Date:   Hospital Encounter and Summary Linked: Yes  See discharge assessment below for further details   Engagement  Call Start Time: 1316 (1/9/2025  1:15 PM)    Medications  Medications reviewed with patient/caregiver?: Yes (1/9/2025  1:15 PM)  Is the patient having any side effects they believe may be caused by any medication additions or changes?: No (1/9/2025  1:15 PM)  Does the patient have all medications ordered at discharge?: Yes (1/9/2025  1:15 PM)  Care Management Interventions: No intervention needed (1/9/2025  1:15 PM)  Prescription Comments: START taking these medications   o benzonatate 100 mg capsule; Commonly known as: Tessalon  o metoprolol succinate XL 25 mg 24 hr tablet; Commonly known as: Toprol-XL  CHANGE how you take these medications   o * albuterol 90 mcg/actuation inhaler  o * albuterol 0.63 mg/3 mL nebulizer solution; Take 3 mL (0.63 mg) by   nebulization 3 times a day.  o glimepiride 2 mg tablet; Commonly known as: Amaryl; TAKE 1 TABLET BY   MOUTH ONCE DAILY AS DIRECTED; What changed: when to take this, additional   instructions  o levothyroxine 75 mcg tablet; Commonly known as: Synthroid, Levoxyl; TAKE   1 TABLET BY MOUTH ONCE DAILY AS DIRECTED; What changed: when to take this,   additional instructions  * This list has 2 medication(s) that are the same as other medications   prescribed for you. Read the directions carefully, and ask your doctor or   other care provider to review them with you.    STOP taking these medications   o gabapentin 100 mg capsule; Commonly known as: Neurontin (1/9/2025  1:15 PM)  Is the patient taking all medications as directed (includes completed  medication regime)?: Yes (1/9/2025  1:15 PM)  Care Management Interventions: Provided patient education (1/9/2025  1:15 PM)  Medication Comments: Patient verbalized understanding of discharge medications. (1/9/2025  1:15 PM)    Appointments  Does the patient have a primary care provider?: Yes (1/9/2025  1:15 PM)  Care Management Interventions: Educated patient on importance of making appointment (Messaged office as follow up appt was not available within 14 days) (1/9/2025  1:15 PM)  Has the patient kept scheduled appointments due by today?: Yes (1/9/2025  1:15 PM)    Self Management  What is the home health agency?: PCP office to order (1/9/2025  1:15 PM)  What Durable Medical Equipment (DME) was ordered?: n/a (1/9/2025  1:15 PM)    Patient Teaching  Does the patient have access to their discharge instructions?: Yes (1/9/2025  1:15 PM)  Care Management Interventions: Reviewed instructions with patient (1/9/2025  1:15 PM)  What is the patient's perception of their health status since discharge?: Improving (1/9/2025  1:15 PM)  Is the patient/caregiver able to teach back the hierarchy of who to call/visit for symptoms/problems? PCP, Specialist, Home Health nurse, Urgent Care, ED, 911: Yes (1/9/2025  1:15 PM)  Patient/Caregiver Education Comments: Spoke with patient. States there is improvement of symptoms.Still has a productive cough. Is using O2 @2 liters at night. She had not been to pharmacy yet today to  scripts but intends to. States feeling weak. She stated hhc was discussed at the hospital but ti was ordered. MA at PCP office stated she would take care of ordering it. (1/9/2025  1:15 PM)

## 2025-01-10 DIAGNOSIS — I48.0 PAROXYSMAL ATRIAL FIBRILLATION (MULTI): ICD-10-CM

## 2025-01-10 DIAGNOSIS — J43.9 PULMONARY EMPHYSEMA, UNSPECIFIED EMPHYSEMA TYPE (MULTI): Primary | ICD-10-CM

## 2025-01-10 RX ORDER — METOPROLOL SUCCINATE 25 MG/1
25 TABLET, EXTENDED RELEASE ORAL DAILY
Qty: 90 TABLET | Refills: 1 | Status: SHIPPED | OUTPATIENT
Start: 2025-01-10

## 2025-01-10 RX ORDER — ALBUTEROL SULFATE 90 UG/1
2 INHALANT RESPIRATORY (INHALATION) EVERY 6 HOURS PRN
Qty: 18 G | Refills: 2 | Status: SHIPPED | OUTPATIENT
Start: 2025-01-10

## 2025-01-13 ENCOUNTER — APPOINTMENT (OUTPATIENT)
Dept: PRIMARY CARE | Facility: CLINIC | Age: 75
End: 2025-01-13
Payer: MEDICARE

## 2025-01-13 ENCOUNTER — TELEPHONE (OUTPATIENT)
Dept: PRIMARY CARE | Facility: CLINIC | Age: 75
End: 2025-01-13

## 2025-01-15 ENCOUNTER — APPOINTMENT (OUTPATIENT)
Dept: CARDIOLOGY | Facility: CLINIC | Age: 75
End: 2025-01-15
Payer: MEDICARE

## 2025-01-16 ENCOUNTER — APPOINTMENT (OUTPATIENT)
Dept: PHARMACY | Facility: CLINIC | Age: 75
End: 2025-01-16
Payer: MEDICARE

## 2025-01-16 ENCOUNTER — ANTICOAGULATION - WARFARIN VISIT (OUTPATIENT)
Dept: PHARMACY | Facility: CLINIC | Age: 75
End: 2025-01-16
Payer: MEDICARE

## 2025-01-16 DIAGNOSIS — I48.0 PAROXYSMAL ATRIAL FIBRILLATION (MULTI): ICD-10-CM

## 2025-01-16 DIAGNOSIS — Z95.2 MECHANICAL HEART VALVE PRESENT: Primary | ICD-10-CM

## 2025-01-16 LAB
POC INR: 3.9
POC PROTHROMBIN TIME: NORMAL

## 2025-01-16 PROCEDURE — 99212 OFFICE O/P EST SF 10 MIN: CPT | Performed by: PHARMACIST

## 2025-01-16 PROCEDURE — 85610 PROTHROMBIN TIME: CPT | Mod: QW | Performed by: PHARMACIST

## 2025-01-16 NOTE — PROGRESS NOTES
Coumadin Clinic Visit Note    Patient presents today for anticoagulation monitoring and adjustment.  Patient verified warfarin dosing schedule  Patient denies missing any doses  Patient denies unusual bruising or bleeding  Patient denies changes to medications, alcohol or tobacco use.  Consistent dietary green intake  There are no anticipated procedures at this time  INR Supratherapeutic today at 3.9  May be due to steroids pt was on in the hospital  last week for CHF.  Has breathing issues.  Hold today one dose  Pt likes broccoli and will have an extra serving.  Pt is no longer on AB or steroids.  Next appointment 2 weeks      Nu Laguna, PharmD

## 2025-01-21 ENCOUNTER — APPOINTMENT (OUTPATIENT)
Dept: PHARMACY | Facility: CLINIC | Age: 75
End: 2025-01-21
Payer: MEDICARE

## 2025-01-23 ENCOUNTER — PATIENT OUTREACH (OUTPATIENT)
Dept: PRIMARY CARE | Facility: CLINIC | Age: 75
End: 2025-01-23
Payer: MEDICARE

## 2025-01-23 ENCOUNTER — OFFICE VISIT (OUTPATIENT)
Dept: CARDIOLOGY | Facility: CLINIC | Age: 75
End: 2025-01-23
Payer: MEDICARE

## 2025-01-23 VITALS
SYSTOLIC BLOOD PRESSURE: 160 MMHG | WEIGHT: 205.8 LBS | DIASTOLIC BLOOD PRESSURE: 70 MMHG | OXYGEN SATURATION: 96 % | BODY MASS INDEX: 37.87 KG/M2 | HEART RATE: 104 BPM | HEIGHT: 62 IN

## 2025-01-23 DIAGNOSIS — J45.40 MODERATE PERSISTENT ASTHMATIC BRONCHITIS WITHOUT COMPLICATION (HHS-HCC): ICD-10-CM

## 2025-01-23 DIAGNOSIS — E03.9 HYPOTHYROIDISM, UNSPECIFIED TYPE: ICD-10-CM

## 2025-01-23 DIAGNOSIS — I48.0 PAROXYSMAL ATRIAL FIBRILLATION (MULTI): ICD-10-CM

## 2025-01-23 DIAGNOSIS — J43.2 CENTRILOBULAR EMPHYSEMA (MULTI): ICD-10-CM

## 2025-01-23 DIAGNOSIS — I48.11 LONGSTANDING PERSISTENT ATRIAL FIBRILLATION (MULTI): Primary | ICD-10-CM

## 2025-01-23 DIAGNOSIS — E11.9 TYPE 2 DIABETES MELLITUS WITHOUT COMPLICATION, WITHOUT LONG-TERM CURRENT USE OF INSULIN (MULTI): ICD-10-CM

## 2025-01-23 DIAGNOSIS — I10 PRIMARY HYPERTENSION: ICD-10-CM

## 2025-01-23 DIAGNOSIS — Z86.79 HISTORY OF RHEUMATIC FEVER: ICD-10-CM

## 2025-01-23 DIAGNOSIS — Z95.2 HISTORY OF PROSTHETIC HEART VALVE: ICD-10-CM

## 2025-01-23 DIAGNOSIS — J10.1 INFLUENZA A: ICD-10-CM

## 2025-01-23 DIAGNOSIS — E66.812 CLASS 2 OBESITY WITHOUT SERIOUS COMORBIDITY WITH BODY MASS INDEX (BMI) OF 38.0 TO 38.9 IN ADULT, UNSPECIFIED OBESITY TYPE: ICD-10-CM

## 2025-01-23 DIAGNOSIS — I35.0 NONRHEUMATIC AORTIC VALVE STENOSIS: ICD-10-CM

## 2025-01-23 DIAGNOSIS — Z95.2 MECHANICAL HEART VALVE PRESENT: ICD-10-CM

## 2025-01-23 DIAGNOSIS — I50.30 HEART FAILURE WITH PRESERVED EJECTION FRACTION, UNSPECIFIED HF CHRONICITY: ICD-10-CM

## 2025-01-23 PROBLEM — Z86.39 HISTORY OF OBESITY: Status: RESOLVED | Noted: 2024-03-13 | Resolved: 2025-01-23

## 2025-01-23 PROBLEM — Z86.39 HISTORY OF HYPOTHYROIDISM: Status: RESOLVED | Noted: 2024-03-13 | Resolved: 2025-01-23

## 2025-01-23 LAB
Q ONSET: 210 MS
QRS COUNT: 16 BEATS
QRS DURATION: 100 MS
QT INTERVAL: 364 MS
QTC CALCULATION(BAZETT): 455 MS
QTC FREDERICIA: 422 MS
R AXIS: 79 DEGREES
T AXIS: -31 DEGREES
T OFFSET: 392 MS
VENTRICULAR RATE: 94 BPM

## 2025-01-23 PROCEDURE — 1160F RVW MEDS BY RX/DR IN RCRD: CPT | Performed by: INTERNAL MEDICINE

## 2025-01-23 PROCEDURE — 93005 ELECTROCARDIOGRAM TRACING: CPT | Performed by: INTERNAL MEDICINE

## 2025-01-23 PROCEDURE — 93010 ELECTROCARDIOGRAM REPORT: CPT | Performed by: INTERNAL MEDICINE

## 2025-01-23 PROCEDURE — 99214 OFFICE O/P EST MOD 30 MIN: CPT | Performed by: INTERNAL MEDICINE

## 2025-01-23 PROCEDURE — 3078F DIAST BP <80 MM HG: CPT | Performed by: INTERNAL MEDICINE

## 2025-01-23 PROCEDURE — 1123F ACP DISCUSS/DSCN MKR DOCD: CPT | Performed by: INTERNAL MEDICINE

## 2025-01-23 PROCEDURE — 3008F BODY MASS INDEX DOCD: CPT | Performed by: INTERNAL MEDICINE

## 2025-01-23 PROCEDURE — 1159F MED LIST DOCD IN RCRD: CPT | Performed by: INTERNAL MEDICINE

## 2025-01-23 PROCEDURE — 1111F DSCHRG MED/CURRENT MED MERGE: CPT | Performed by: INTERNAL MEDICINE

## 2025-01-23 PROCEDURE — 1036F TOBACCO NON-USER: CPT | Performed by: INTERNAL MEDICINE

## 2025-01-23 PROCEDURE — 3077F SYST BP >= 140 MM HG: CPT | Performed by: INTERNAL MEDICINE

## 2025-01-23 RX ORDER — WARFARIN SODIUM 5 MG/1
TABLET ORAL
Qty: 55 TABLET | Refills: 3 | Status: SHIPPED | OUTPATIENT
Start: 2025-01-23

## 2025-01-23 NOTE — PROGRESS NOTES
"  Subjective  Genet Wilks  is a 74 y.o. year old female who presents for HFU fo influenze, S/P MVR in chronic atrial fibrillation.  Discharged  1/8/25.  Breathing is good, no chest pina, no palpitations, no edema    Blood pressure 160/70, pulse 104, height 1.575 m (5' 2\"), weight 93.4 kg (205 lb 12.8 oz), SpO2 96%.   Penicillins, Prednisone, Mushroom, and Simvastatin  Past Medical History:   Diagnosis Date    Long term (current) use of anticoagulants     Long-term (current) use of anticoagulants    Old myocardial infarction     History of non-ST elevation myocardial infarction (NSTEMI)    Personal history of (corrected) congenital malformations of heart and circulatory system     History of congenital anomaly of heart    Personal history of other diseases of the circulatory system     History of rheumatic fever    Personal history of other diseases of the circulatory system     History of mitral valve prolapse    Personal history of other diseases of the circulatory system     History of atrial fibrillation    Personal history of other diseases of the musculoskeletal system and connective tissue     History of back pain    Personal history of other diseases of the nervous system and sense organs     History of carpal tunnel syndrome    Personal history of other diseases of the respiratory system     History of asthma    Personal history of other endocrine, nutritional and metabolic disease     History of morbid obesity    Personal history of other endocrine, nutritional and metabolic disease     History of hypothyroidism    Personal history of other endocrine, nutritional and metabolic disease     History of hypercholesterolemia    Personal history of other specified conditions     History of chest pain    Presence of other heart-valve replacement     Heart valve replaced by other means    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    Mechanical heart valve present     Past Surgical History:   Procedure " Laterality Date    CARDIAC CATHETERIZATION  06/15/2018    Cardiac Cath Procedure Outcome:    CARDIAC SURGERY  11/13/2014    Heart Surgery    KNEE SURGERY  06/15/2018    Knee Surgery    OTHER SURGICAL HISTORY  11/13/2014    Surgery    OTHER SURGICAL HISTORY  11/13/2014    Colposcopy Cervix With Biopsy(S)    TONSILLECTOMY  11/13/2014    Tonsillectomy    TUBAL LIGATION  11/13/2014    Tubal Ligation     Family History   Problem Relation Name Age of Onset    Diabetes Mother      Stroke Father      Hypertension Father      Diabetes Sister      Breast cancer Sister      Throat cancer Brother      Diabetes Brother      Other (S/P CABG x3) Brother      Cancer Other sibling      @SOC    Current Outpatient Medications   Medication Sig Dispense Refill    albuterol 0.63 mg/3 mL nebulizer solution Take 3 mL (0.63 mg) by nebulization 3 times a day. 270 mL 3    albuterol 90 mcg/actuation inhaler Inhale 2 puffs every 6 hours if needed for wheezing or shortness of breath. 18 g 2    amLODIPine (Norvasc) 5 mg tablet Take 1 tablet by mouth once daily 90 tablet 3    aspirin 81 mg EC tablet Take by mouth.      atorvastatin (Lipitor) 20 mg tablet TAKE 1 TABLET BY MOUTH AT BEDTIME 90 tablet 3    benzonatate (Tessalon) 100 mg capsule Take 1 capsule (100 mg) by mouth 3 times a day as needed for cough. 20 capsule 0    blood sugar diagnostic (OneTouch Ultra Test) strip USE 1 STRIP TO CHECK GLUCOSE ONCE DAILY 100 each 3    cholecalciferol (Vitamin D-3) 50 MCG (2000 UT) tablet Take 1 tablet (2,000 Units) by mouth once daily.      furosemide (Lasix) 40 mg tablet Take 1 tablet (40 mg) by mouth once daily as needed (swelling).      glimepiride (Amaryl) 2 mg tablet TAKE 1 TABLET BY MOUTH ONCE DAILY AS DIRECTED 90 tablet 1    lancets (OneTouch Delica Plus Lancet) 33 gauge misc USE 1 TO CHECK GLUCOSE ONCE DAILY 100 each 3    levothyroxine (Synthroid, Levoxyl) 75 mcg tablet TAKE 1 TABLET BY MOUTH ONCE DAILY AS DIRECTED 90 tablet 1    metoprolol  succinate XL (Toprol-XL) 25 mg 24 hr tablet Take 1 tablet (25 mg) by mouth once daily. 90 tablet 1    oxygen (O2) gas therapy Inhale continuously.      warfarin (Coumadin) 5 mg tablet Take 5 mg (1 tablet) every Monday and 2.5 mg (1/2 tablet) all other days or as directed by the anticoagulation clinic. 55 tablet 3     No current facility-administered medications for this visit.        ROS  Review of Systems   All other systems reviewed and are negative.      Physical Exam  Physical Exam  Constitutional:       Appearance: She is obese.   HENT:      Head: Normocephalic and atraumatic.   Cardiovascular:      Rate and Rhythm: Normal rate. Rhythm irregularly irregular.      Comments: Crisp valve sounds  Pulmonary:      Effort: Pulmonary effort is normal.      Comments: Decreased breath sounds throughout  Abdominal:      Palpations: Abdomen is soft.   Musculoskeletal:      Right lower leg: No edema.      Left lower leg: No edema.   Neurological:      General: No focal deficit present.      Mental Status: She is alert and oriented to person, place, and time.   Psychiatric:         Mood and Affect: Mood normal.         Behavior: Behavior normal.          EKG  Encounter Date: 01/23/25   ECG 12 Lead   Result Value    Ventricular Rate 94    QRS Duration 100    QT Interval 364    QTC Calculation(Bazett) 455    R Axis 79    T Axis -31    QRS Count 16    Q Onset 210    T Offset 392    QTC Fredericia 422    Narrative    Atrial fibrillation  ST & T wave abnormality, consider inferior ischemia  Abnormal ECG  When compared with ECG of 03-JAN-2025 01:50,  No significant change was found       Problem List Items Addressed This Visit       Primary hypertension    (HFpEF) heart failure with preserved ejection fraction    COPD (chronic obstructive pulmonary disease) (Multi)    Hypothyroidism    Mechanical heart valve present     1/6/25 echocardiogram normal mechanical MVR, mild aortic stenosis, LVEF = 55%         Relevant Orders    Follow  Up In Cardiology    Asthmatic bronchitis ([facility])    Diabetes mellitus (Multi)    Class 2 obesity with body mass index (BMI) of 38.0 to 38.9 in adult    Paroxysmal atrial fibrillation (Multi)    History of rheumatic fever    Influenza A     Hospitalized [facility] under Dr. Hansen discharged 1/8/25         Nonrheumatic aortic valve stenosis     Mild on echocardiogram of 1/6/25          Other Visit Diagnoses       Longstanding persistent atrial fibrillation (Multi)    -  Primary    Relevant Orders    ECG 12 Lead (Completed)    Follow Up In Cardiology              Same meds  Return 3 months with EKG      Lul Day MD

## 2025-01-24 DIAGNOSIS — M25.472 ANKLE EDEMA, BILATERAL: Primary | ICD-10-CM

## 2025-01-24 DIAGNOSIS — M25.471 ANKLE EDEMA, BILATERAL: Primary | ICD-10-CM

## 2025-01-24 RX ORDER — FUROSEMIDE 40 MG/1
40 TABLET ORAL DAILY PRN
Qty: 90 TABLET | Refills: 1 | Status: SHIPPED | OUTPATIENT
Start: 2025-01-24

## 2025-01-27 DIAGNOSIS — M25.472 ANKLE EDEMA, BILATERAL: ICD-10-CM

## 2025-01-27 DIAGNOSIS — M25.471 ANKLE EDEMA, BILATERAL: ICD-10-CM

## 2025-01-30 ENCOUNTER — APPOINTMENT (OUTPATIENT)
Dept: PHARMACY | Facility: CLINIC | Age: 75
End: 2025-01-30
Payer: MEDICARE

## 2025-01-30 ENCOUNTER — ANTICOAGULATION - WARFARIN VISIT (OUTPATIENT)
Dept: PHARMACY | Facility: CLINIC | Age: 75
End: 2025-01-30
Payer: MEDICARE

## 2025-01-30 DIAGNOSIS — I48.0 PAROXYSMAL ATRIAL FIBRILLATION (MULTI): ICD-10-CM

## 2025-01-30 DIAGNOSIS — Z95.2 MECHANICAL HEART VALVE PRESENT: Primary | ICD-10-CM

## 2025-01-30 LAB
POC INR: 2.7
POC PROTHROMBIN TIME: NORMAL

## 2025-01-30 PROCEDURE — 99212 OFFICE O/P EST SF 10 MIN: CPT | Performed by: PHARMACIST

## 2025-01-30 PROCEDURE — 85610 PROTHROMBIN TIME: CPT | Mod: QW | Performed by: PHARMACIST

## 2025-01-30 NOTE — PROGRESS NOTES
Verified current dose with pt.    No new meds or med changes since last visit.  Pt denies unusual bleed/bruise.  Pt having 2 teeth extracted on Feb 4th.  Does not need to hold warfarin.  No missed doses  No diet changes  Pt has lost 14 lbs  Inr = 2.7  Continue same dose and check again in 5 weeks.

## 2025-01-31 DIAGNOSIS — E03.9 HYPOTHYROIDISM, UNSPECIFIED TYPE: ICD-10-CM

## 2025-01-31 DIAGNOSIS — E13.9 DIABETES 1.5, MANAGED AS TYPE 2 (MULTI): ICD-10-CM

## 2025-01-31 RX ORDER — GLIMEPIRIDE 2 MG/1
2 TABLET ORAL DAILY
Qty: 90 TABLET | Refills: 0 | Status: SHIPPED | OUTPATIENT
Start: 2025-01-31

## 2025-01-31 RX ORDER — LEVOTHYROXINE SODIUM 75 UG/1
75 TABLET ORAL DAILY
Qty: 90 TABLET | Refills: 0 | Status: SHIPPED | OUTPATIENT
Start: 2025-01-31

## 2025-02-03 ENCOUNTER — APPOINTMENT (OUTPATIENT)
Dept: PRIMARY CARE | Facility: CLINIC | Age: 75
End: 2025-02-03
Payer: MEDICARE

## 2025-02-03 ENCOUNTER — TELEPHONE (OUTPATIENT)
Dept: CARDIOLOGY | Facility: CLINIC | Age: 75
End: 2025-02-03

## 2025-02-03 DIAGNOSIS — M17.0 PRIMARY OSTEOARTHRITIS OF BOTH KNEES: ICD-10-CM

## 2025-02-03 DIAGNOSIS — E11.65 UNCONTROLLED TYPE 2 DIABETES MELLITUS WITH HYPERGLYCEMIA: Primary | ICD-10-CM

## 2025-02-03 DIAGNOSIS — Z95.2 MECHANICAL HEART VALVE PRESENT: ICD-10-CM

## 2025-02-03 DIAGNOSIS — E13.9 DIABETES 1.5, MANAGED AS TYPE 2 (MULTI): ICD-10-CM

## 2025-02-03 DIAGNOSIS — Z86.79 HISTORY OF RHEUMATIC FEVER: ICD-10-CM

## 2025-02-03 DIAGNOSIS — Z79.2 NEED FOR ANTIBIOTIC PROPHYLAXIS FOR DENTAL PROCEDURE: ICD-10-CM

## 2025-02-03 DIAGNOSIS — I48.0 PAROXYSMAL ATRIAL FIBRILLATION (MULTI): ICD-10-CM

## 2025-02-03 DIAGNOSIS — J41.1 MUCOPURULENT CHRONIC BRONCHITIS (MULTI): ICD-10-CM

## 2025-02-03 LAB
APPEARANCE UR: CLEAR
BILIRUB UR QL STRIP: NEGATIVE
COLOR UR: YELLOW
GLUCOSE UR STRIP-MCNC: ABNORMAL MG/DL
HBA1C MFR BLD: 8.5 % (ref 4.2–6.5)
HGB UR QL STRIP: NEGATIVE
KETONES UR STRIP-MCNC: NEGATIVE MG/DL
LEUKOCYTE ESTERASE UR QL STRIP: NEGATIVE
NITRITE UR QL STRIP: NEGATIVE
PH UR STRIP: 5.5 [PH]
PROT UR STRIP-MCNC: NEGATIVE MG/DL
SP GR UR STRIP.AUTO: 1.02
UROBILINOGEN UR STRIP-ACNC: 0.2 E.U./DL

## 2025-02-03 PROCEDURE — 1159F MED LIST DOCD IN RCRD: CPT | Performed by: FAMILY MEDICINE

## 2025-02-03 PROCEDURE — 81003 URINALYSIS AUTO W/O SCOPE: CPT | Performed by: FAMILY MEDICINE

## 2025-02-03 PROCEDURE — 83036 HEMOGLOBIN GLYCOSYLATED A1C: CPT | Mod: CLIA WAIVED TEST | Performed by: FAMILY MEDICINE

## 2025-02-03 PROCEDURE — 1123F ACP DISCUSS/DSCN MKR DOCD: CPT | Performed by: FAMILY MEDICINE

## 2025-02-03 PROCEDURE — 3052F HG A1C>EQUAL 8.0%<EQUAL 9.0%: CPT | Performed by: FAMILY MEDICINE

## 2025-02-03 PROCEDURE — 99214 OFFICE O/P EST MOD 30 MIN: CPT | Performed by: FAMILY MEDICINE

## 2025-02-03 PROCEDURE — 1111F DSCHRG MED/CURRENT MED MERGE: CPT | Performed by: FAMILY MEDICINE

## 2025-02-03 PROCEDURE — 1160F RVW MEDS BY RX/DR IN RCRD: CPT | Performed by: FAMILY MEDICINE

## 2025-02-03 PROCEDURE — 1036F TOBACCO NON-USER: CPT | Performed by: FAMILY MEDICINE

## 2025-02-03 RX ORDER — GLIMEPIRIDE 2 MG/1
TABLET ORAL
Qty: 180 TABLET | Refills: 3 | Status: SHIPPED | OUTPATIENT
Start: 2025-02-03

## 2025-02-03 RX ORDER — AZITHROMYCIN 250 MG/1
TABLET, FILM COATED ORAL
Qty: 6 TABLET | Refills: 0 | Status: SHIPPED | OUTPATIENT
Start: 2025-02-03 | End: 2025-02-08

## 2025-02-03 ASSESSMENT — PATIENT HEALTH QUESTIONNAIRE - PHQ9
2. FEELING DOWN, DEPRESSED OR HOPELESS: NOT AT ALL
SUM OF ALL RESPONSES TO PHQ9 QUESTIONS 1 AND 2: 0
1. LITTLE INTEREST OR PLEASURE IN DOING THINGS: NOT AT ALL

## 2025-02-03 NOTE — PROGRESS NOTES
Subjective   Genet Wilks is a 74 y.o. female who presents for Follow-up (Follow up visit after ER visit with admission to [facility] for cough and SOB)    HPI : Patient is a 74 year old female admitted from ER  with Flu syndrome and shortness of breath on 12 /31 /2024.  She  felt very short of breath  and was on Oxygen.  She was coughing very severely and is using home nebulizer treatments. She was hospitalized until   1 / 8 2024.   Patient is in  today for follow up evaluation and recheck on blood sugar , A1c and urine for micro albumin.  Patient states she has not really been that sick in a long time and apparently had a lot of trouble breathing in the hospital from the bronchitis, flu syndrome and possible CHF.  She has already seen her cardiologist in follow-up   and he states that her heart is stable.  She is on Coumadin and has a history of previous mitral valve replacement surgery in 2001.    Objective   : ROS :10 systems were reviewed and the information is included in the HPI and no additional review of systems is indicated.    Physical Exam  Vitals and nursing note reviewed.   Constitutional:       Appearance: Normal appearance. She is obese.      Comments: Patient is alert and oriented x3.   No acute distress   HENT:      Head: Normocephalic.      Right Ear: Tympanic membrane and external ear normal.      Left Ear: Tympanic membrane and external ear normal.      Ears:      Comments: Ears are patent bilaterally and TMs are clear.     Nose: Nose normal.      Mouth/Throat:      Mouth: Mucous membranes are moist.      Pharynx: Oropharynx is clear.      Comments: Mouth is moist, tongue is midline.  No posterior pharyngeal erythema.  Eyes:      Extraocular Movements: Extraocular movements intact.      Conjunctiva/sclera: Conjunctivae normal.      Pupils: Pupils are equal, round, and reactive to light.      Comments: No visual disturbance, does have her eyes examined once a year.   Neck:      Comments: No  carotid bruits, no thyromegaly, no cervical adenopathy.  Occasional neck spasm and restriction of motion secondary to stress and tension.  Cardiovascular:      Rate and Rhythm: Normal rate and regular rhythm.      Pulses: Normal pulses.      Heart sounds: Normal heart sounds.      Comments: Mitral valve surgery 2001.   Patient denies chest pain and no palpitations.  Heart rhythm is stable S1 and S2 are noted, Click from Mitral valve replacement.  Pulmonary:      Effort: Pulmonary effort is normal.      Breath sounds: Rhonchi present.      Comments: Patient has shallow depth of inspiration with few scattered rhonchi to auscultation.  No wheezing noted.  She has a long history of asthma and asthmatic bronchitis.  Abdominal:      General: Bowel sounds are normal.      Palpations: Abdomen is soft.      Comments: Abdomen is soft and obese but nontender.  No flank tenderness.  No suprapubic pain.  Positive bowel sounds x4.  No abdominal guarding and no rebound tenderness.   Genitourinary:     Comments: Patient denies dysuria, no hematuria, no nocturia, denies flank pain.  Musculoskeletal:         General: Tenderness present. Normal range of motion.      Cervical back: Normal range of motion and neck supple.      Comments: Osteoarthritis of the hips and knees.  Age-related arthritis in the joints.  Restriction of motion cervical and lumbar spines due to arthritis, and muscle spasm.  Does have  lumbosacral degenerative disc disease.   Skin:     General: Skin is warm and dry.      Coloration: Skin is pale.      Comments: Pale  from recent hospital stay.   Neurological:      General: No focal deficit present.      Mental Status: She is alert and oriented to person, place, and time. Mental status is at baseline.      Sensory: Sensory deficit present.      Comments: No focal neurosensory deficits are noted.  Patient does have some diabetic peripheral neuropathy.  Coordination and gait are stable.  Normal muscle strength upper  and lower extremities.   Psychiatric:         Mood and Affect: Mood normal.         Behavior: Behavior normal.         Thought Content: Thought content normal.         Judgment: Judgment normal.      Comments: Patient has normal mood and affect.  Increased anxiety about her recent hospital stay.  She apparently was quite sick.  Thought content and judgment are stable.  No signs of vascular dementia.  Behavior is normal.     PLAN:   Patient was hospitalized from December 31, 2024 until January 6, 2025.  She was on antibiotics and oxygen, and given nebulizer aerosol treatments.  She apparently was very congested and short of breath and also received IV steroids which has elevated her blood sugar.  Her sugar was up to 323 today and her A1c is up to 8.5%.  I will increase her Amaryl to 2 mg twice daily and she does check her sugars at home and will try to watch her diet closely since she is now off the steroids.  She also will give us a urine for microalbumin and urinalysis.  The urinalysis shows a pH of 5.5, specific gravity 1.020, negative leukocytes, negative protein, negative blood, one 500 mg/dL glucose.  Patient will follow-up in 3 months or sooner as needed.    Problem List Items Addressed This Visit    None           Mitesh Simms DO

## 2025-02-03 NOTE — TELEPHONE ENCOUNTER
Pt is scheduled to have extractions done tomorrow and told Dr. Jason Cardona's office that she doesn't need to stop blood thinner. His office would like to talk to a nurse to verify.

## 2025-02-03 NOTE — TELEPHONE ENCOUNTER
Spoke with [facility]- advised patient is unable to hold Warfarin d/t history of mechanical heart valve. If she needed to hold Warfarin for a dental procedure then patient will require bridging.  Jul[facility] requested that info be faxed to them- [phone]. Info faxed successfully along with OV note 1/23/25.

## 2025-02-04 LAB
ALBUMIN/CREAT UR: 4 MG/G CREAT
CREAT UR-MCNC: 125 MG/DL (ref 20–275)
MICROALBUMIN UR-MCNC: 0.5 MG/DL

## 2025-03-05 ENCOUNTER — PATIENT OUTREACH (OUTPATIENT)
Dept: PRIMARY CARE | Facility: CLINIC | Age: 75
End: 2025-03-05
Payer: MEDICARE

## 2025-03-06 ENCOUNTER — APPOINTMENT (OUTPATIENT)
Dept: PHARMACY | Facility: CLINIC | Age: 75
End: 2025-03-06
Payer: MEDICARE

## 2025-03-10 ENCOUNTER — APPOINTMENT (OUTPATIENT)
Dept: PRIMARY CARE | Facility: CLINIC | Age: 75
End: 2025-03-10
Payer: MEDICARE

## 2025-03-11 ENCOUNTER — ANTICOAGULATION - WARFARIN VISIT (OUTPATIENT)
Dept: PHARMACY | Facility: CLINIC | Age: 75
End: 2025-03-11
Payer: MEDICARE

## 2025-03-11 DIAGNOSIS — Z95.2 MECHANICAL HEART VALVE PRESENT: Primary | ICD-10-CM

## 2025-03-11 DIAGNOSIS — I48.0 PAROXYSMAL ATRIAL FIBRILLATION (MULTI): ICD-10-CM

## 2025-03-11 LAB
POC INR: 3.7
POC PROTHROMBIN TIME: NORMAL

## 2025-03-11 PROCEDURE — 99212 OFFICE O/P EST SF 10 MIN: CPT | Performed by: PHARMACIST

## 2025-03-11 PROCEDURE — 85610 PROTHROMBIN TIME: CPT | Mod: QW | Performed by: PHARMACIST

## 2025-03-11 RX ORDER — ENOXAPARIN SODIUM 100 MG/ML
90 INJECTION SUBCUTANEOUS EVERY 12 HOURS
Qty: 12 EACH | Refills: 0 | Status: SHIPPED | OUTPATIENT
Start: 2025-03-11

## 2025-03-11 NOTE — PROGRESS NOTES
"Coumadin Clinic Visit Note    Patient verified warfarin dose  No missed doses  No unusual bruising or bleeding  No changes to medications  Consistent dietary green intake  Having dental work on 3/18 , will pull 3 tooth ?? Talled to dentist  dr Cardona  [phone]  and asked if dentist need to stop coumadin  or not , last time back in February , he said he did not need to stop the coumadin but dr told her to psotapone so now on th 18 th , I asked them if need to stop  and how many days so we can bridge patinet accordingly , the  said that they got the letter from dr Day office to bridge but did not say for how many days to stop so I explained that we work with dr Day and will do bridging but we need to check with denstist how many days he is comfortable with stopping the coumadin ,  was arguing that they cannot tell us but said that she will ask dentist and let us know , gave her our office phone and will wait for response   INR Supratherapeutic today at 3.7  Skip today   Dentist did not call back so I reached out to dr Day with suggestion to stop for 5 days since was original plan in 11/24 and bridge , he said soumd good , via secure message so will stop 5 days before procedure , , have patinet come in on Thursday to go over bridging sheet and check aother inr since was high today   On 1/8/25 , creatinine was0.73 , she is 93.4 kg and platelet was 279 , crcl calculated was 60 ml/min so ok lovenox bid   Enoxaparin (Lovenox) \"Bridging for Warfarin (Coumadin/Jantoven)  Enoxaparin (Lovenox) Dose and Frequency:                                   Pre-Op and Post-Op Anticoagulation Instructions            Day          Date               Instructions             -7               -6               -5 3/13 Do not take Warfarin             -4 3/14 Do not take Warfarin  Inject Enoxaparin (Lovenox) in the morning and evening (12 hours apart)             -3 3/15 Do not take " Warfarin  Inject Enoxaparin (Lovenox) in the morning and evening (12 hours apart)             -2 3/16 Do not take Warfarin  Inject Enoxaparin (Lovenox) in the morning and evening (12 hours apart)             -1  DAY BEFORE PROCEDURE 3/17 Do not take Warfarin  Only take Enoxaparin (Lovenox) dose in the morning  Do not take evening Enoxaparin (Lovenox)        DAY OF         PROCEDURE 3/18 Do not take Enoxaparin (Lovenox) today  Take your usual dose of warfarin in the evening if allowed by dentist and if not bleeding             +1 3/19 Take you usual dose of warfarin  Inject Enoxaparin (Lovenox) in the morning and evening (12 hours apart)            +2 3/20 Take you usual dose of warfarin  Inject Enoxaparin (Lovenox) in the morning and evening (12 hours apart)            +3              +4  Schedule appointment with Anticoagulation Clinic to access further need for Enoxaparin (Lovenox)            +5          Nancy Ludwig, RohithD

## 2025-03-11 NOTE — PATIENT INSTRUCTIONS
"Enoxaparin (Lovenox) \"Bridging for Warfarin (Coumadin/Jantoven)  Enoxaparin (Lovenox) Dose and Frequency:                                   Pre-Op and Post-Op Anticoagulation Instructions            Day          Date               Instructions             -7               -6               -5 3/13 Do not take Warfarin             -4 3/14 Do not take Warfarin  Inject Enoxaparin (Lovenox) in the morning and evening (12 hours apart)             -3 3/15 Do not take Warfarin  Inject Enoxaparin (Lovenox) in the morning and evening (12 hours apart)             -2 3/16 Do not take Warfarin  Inject Enoxaparin (Lovenox) in the morning and evening (12 hours apart)             -1  DAY BEFORE PROCEDURE 3/17 Do not take Warfarin  Only take Enoxaparin (Lovenox) dose in the morning  Do not take evening Enoxaparin (Lovenox)        DAY OF         PROCEDURE 3/18 Do not take Enoxaparin (Lovenox) today  Take your usual dose of warfarin in the evening if allowed by dentist and if not bleeding             +1 3/19 Take you usual dose of warfarin  Inject Enoxaparin (Lovenox) in the morning and evening (12 hours apart)            +2 3/20 Take you usual dose of warfarin  Inject Enoxaparin (Lovenox) in the morning and evening (12 hours apart)                       3 3/21 Schedule appointment with Anticoagulation Clinic to access further need for Enoxaparin (Lovenox)                     "

## 2025-03-12 DIAGNOSIS — Z95.2 MECHANICAL HEART VALVE PRESENT: ICD-10-CM

## 2025-03-13 ENCOUNTER — ANTICOAGULATION - WARFARIN VISIT (OUTPATIENT)
Dept: PHARMACY | Facility: CLINIC | Age: 75
End: 2025-03-13
Payer: MEDICARE

## 2025-03-13 DIAGNOSIS — Z95.2 MECHANICAL HEART VALVE PRESENT: Primary | ICD-10-CM

## 2025-03-13 DIAGNOSIS — I48.0 PAROXYSMAL ATRIAL FIBRILLATION (MULTI): ICD-10-CM

## 2025-03-13 LAB
POC INR: 3.2
POC PROTHROMBIN TIME: NORMAL

## 2025-03-13 PROCEDURE — 85610 PROTHROMBIN TIME: CPT | Mod: QW | Performed by: PHARMACIST

## 2025-03-13 PROCEDURE — 99212 OFFICE O/P EST SF 10 MIN: CPT | Performed by: PHARMACIST

## 2025-03-13 RX ORDER — CLINDAMYCIN HYDROCHLORIDE 300 MG/1
600 CAPSULE ORAL ONCE
Qty: 2 CAPSULE | Refills: 0 | Status: SHIPPED | OUTPATIENT
Start: 2025-03-13 | End: 2025-03-13

## 2025-03-13 NOTE — PROGRESS NOTES
Verified current dose with pt.    No new meds or med changes since last visit.  Pt denies unusual bleed/bruise.  Dental procedure is March 18.  Went over lovenox bridge with pt and how to use lovenox - she has used in the past  Inr = 3.2  She is starting the warfarin hold today  Continue same dose and check again within a few days of procedure.

## 2025-03-21 ENCOUNTER — ANTICOAGULATION - WARFARIN VISIT (OUTPATIENT)
Dept: PHARMACY | Facility: CLINIC | Age: 75
End: 2025-03-21
Payer: MEDICARE

## 2025-03-21 DIAGNOSIS — I48.0 PAROXYSMAL ATRIAL FIBRILLATION (MULTI): ICD-10-CM

## 2025-03-21 DIAGNOSIS — Z95.2 MECHANICAL HEART VALVE PRESENT: Primary | ICD-10-CM

## 2025-03-21 LAB
POC INR: 1.1
POC PROTHROMBIN TIME: NORMAL

## 2025-03-21 PROCEDURE — 99212 OFFICE O/P EST SF 10 MIN: CPT | Performed by: PHARMACIST

## 2025-03-21 PROCEDURE — 85610 PROTHROMBIN TIME: CPT | Mod: QW | Performed by: PHARMACIST

## 2025-03-21 NOTE — PROGRESS NOTES
Verified current dose with pt.    No new meds or med changes since last visit.  Pt denies unusual bleed/bruise.  No upcoming procedures.  Inr = 1.1  Take 7.5 mg today (Fri) AND Take 5 mg tomorrow (Sat)  Will call in more lovenox injections (pt says she has 4 left?)  She will call back when she gets home to re-count.  She needs 5 shots.    Continue same dose and check again on Monday.

## 2025-03-24 ENCOUNTER — ANTICOAGULATION - WARFARIN VISIT (OUTPATIENT)
Dept: PHARMACY | Facility: CLINIC | Age: 75
End: 2025-03-24
Payer: MEDICARE

## 2025-03-24 DIAGNOSIS — Z95.2 MECHANICAL HEART VALVE PRESENT: Primary | ICD-10-CM

## 2025-03-24 DIAGNOSIS — I48.0 PAROXYSMAL ATRIAL FIBRILLATION (MULTI): ICD-10-CM

## 2025-03-24 LAB
POC INR: 1.9
POC PROTHROMBIN TIME: NORMAL

## 2025-03-24 PROCEDURE — 85610 PROTHROMBIN TIME: CPT | Mod: QW | Performed by: PHARMACIST

## 2025-03-24 PROCEDURE — 99212 OFFICE O/P EST SF 10 MIN: CPT | Performed by: PHARMACIST

## 2025-03-24 NOTE — PROGRESS NOTES
Pt was here on Friday with inr=1.1 after dental procedure.  Continued bridge over weekend and took 2 boost doses.    Inr = 1.9  Pt is refusing more injections - her stomach/abdomen is covered in bruises.  Pt actually took a full 5 mg last night (Sun) and was supposed to take 1/2 tab.   No unusual bleeding.  No pain meds  Pt is eating well  Since pt took a boost last night (Fri, Sat and Sun), no more boosting  Pt aware to stay away from greens the next few days  Continue usual dose  Back in 2 weeks to be sure therapeutic

## 2025-03-31 ENCOUNTER — APPOINTMENT (OUTPATIENT)
Dept: PRIMARY CARE | Facility: CLINIC | Age: 75
End: 2025-03-31
Payer: MEDICARE

## 2025-03-31 VITALS
DIASTOLIC BLOOD PRESSURE: 64 MMHG | TEMPERATURE: 98 F | HEIGHT: 62 IN | OXYGEN SATURATION: 94 % | WEIGHT: 213 LBS | RESPIRATION RATE: 18 BRPM | HEART RATE: 73 BPM | BODY MASS INDEX: 39.2 KG/M2 | SYSTOLIC BLOOD PRESSURE: 140 MMHG

## 2025-03-31 DIAGNOSIS — J41.1 MUCOPURULENT CHRONIC BRONCHITIS (MULTI): ICD-10-CM

## 2025-03-31 DIAGNOSIS — I48.0 PAROXYSMAL ATRIAL FIBRILLATION (MULTI): ICD-10-CM

## 2025-03-31 DIAGNOSIS — I50.9 ACUTE ON CHRONIC CONGESTIVE HEART FAILURE, UNSPECIFIED HEART FAILURE TYPE: ICD-10-CM

## 2025-03-31 DIAGNOSIS — Z79.899 HIGH RISK MEDICATION USE: ICD-10-CM

## 2025-03-31 DIAGNOSIS — Z79.01 ANTICOAGULATED ON COUMADIN: ICD-10-CM

## 2025-03-31 DIAGNOSIS — J45.40 MODERATE PERSISTENT ASTHMATIC BRONCHITIS WITHOUT COMPLICATION (HHS-HCC): Primary | ICD-10-CM

## 2025-03-31 DIAGNOSIS — Z12.31 ENCOUNTER FOR SCREENING MAMMOGRAM FOR MALIGNANT NEOPLASM OF BREAST: ICD-10-CM

## 2025-03-31 DIAGNOSIS — E11.65 UNCONTROLLED TYPE 2 DIABETES MELLITUS WITH HYPERGLYCEMIA: ICD-10-CM

## 2025-03-31 LAB
POC FINGERSTICK BLOOD GLUCOSE: 167 MG/DL (ref 70–100)
POC INR: 3.2 (ref 0.9–1.1)

## 2025-03-31 PROCEDURE — 3052F HG A1C>EQUAL 8.0%<EQUAL 9.0%: CPT | Performed by: FAMILY MEDICINE

## 2025-03-31 PROCEDURE — 3078F DIAST BP <80 MM HG: CPT | Performed by: FAMILY MEDICINE

## 2025-03-31 PROCEDURE — 85610 PROTHROMBIN TIME: CPT | Performed by: FAMILY MEDICINE

## 2025-03-31 PROCEDURE — 1123F ACP DISCUSS/DSCN MKR DOCD: CPT | Performed by: FAMILY MEDICINE

## 2025-03-31 PROCEDURE — 99214 OFFICE O/P EST MOD 30 MIN: CPT | Performed by: FAMILY MEDICINE

## 2025-03-31 PROCEDURE — 3008F BODY MASS INDEX DOCD: CPT | Performed by: FAMILY MEDICINE

## 2025-03-31 PROCEDURE — 1159F MED LIST DOCD IN RCRD: CPT | Performed by: FAMILY MEDICINE

## 2025-03-31 PROCEDURE — 1036F TOBACCO NON-USER: CPT | Performed by: FAMILY MEDICINE

## 2025-03-31 PROCEDURE — 3077F SYST BP >= 140 MM HG: CPT | Performed by: FAMILY MEDICINE

## 2025-03-31 PROCEDURE — 1126F AMNT PAIN NOTED NONE PRSNT: CPT | Performed by: FAMILY MEDICINE

## 2025-03-31 PROCEDURE — 1160F RVW MEDS BY RX/DR IN RCRD: CPT | Performed by: FAMILY MEDICINE

## 2025-03-31 ASSESSMENT — PAIN SCALES - GENERAL: PAINLEVEL_OUTOF10: 0-NO PAIN

## 2025-03-31 NOTE — PROGRESS NOTES
Subjective   Genet Wilks is a 75 y.o. female who presents for Follow-up (Follow up visit, blood pressure check and medication review completed today. Patient complains of deep cough with sometimes blood. Patient also complains of hair loss./).    HPI   : Patient is in  for follow  up visit from hospital  stay from 12/31  until 1 / 8 / 2025.  Patient had elevated troponin level and also was treated for congestive heart failure and pneumonia.  She did have a mitral valve replaced many years ago and is on Coumadin.  She states she feels much better and her blood pressure and vitals have been stable and she does follow closely with cardiology.  It is too soon today to do her A1c but she will have her blood sugar checked and also her Coumadin INR.    Objective   : ROS : 10 systems were reviewed and the information is included in the HPI and no additional review of systems is indicated.    Physical Exam  Vitals and nursing note reviewed.   Constitutional:       Appearance: Normal appearance. She is obese.      Comments: Patient is alert and oriented x3.   No acute distress   HENT:      Head: Normocephalic.      Right Ear: Tympanic membrane and external ear normal.      Left Ear: Tympanic membrane and external ear normal.      Ears:      Comments: Ears are patent bilaterally and TMs are clear.     Nose: Nose normal.      Mouth/Throat:      Mouth: Mucous membranes are moist.      Pharynx: Oropharynx is clear.      Comments: Mouth is moist, tongue is midline.  No posterior pharyngeal erythema.  Eyes:      Extraocular Movements: Extraocular movements intact.      Conjunctiva/sclera: Conjunctivae normal.      Pupils: Pupils are equal, round, and reactive to light.      Comments: No visual disturbance, does have her eyes examined once a year.   Neck:      Comments: No carotid bruits, no thyromegaly, no cervical adenopathy.  Neck spasm and restriction of motion secondary to arthritis and muscle spasm.  Cardiovascular:       Rate and Rhythm: Normal rate and regular rhythm.      Pulses: Normal pulses.      Heart sounds: Normal heart sounds.      Comments: Patient has a mitral valve replacement from years ago and is on Coumadin for life.  We did check her INR today and she does follow at the Coumadin clinic.  Patient denies chest pain and no palpitations.  Heart rhythm is stable S1 and S2 are noted.  Pulmonary:      Effort: Pulmonary effort is normal.      Breath sounds: Rhonchi present.      Comments: Patient has a long history of asthmatic bronchitis and also recent bronchial pneumonia.  Today she has few scattered rhonchi but inspiration and expiration are normal.  Abdominal:      General: Bowel sounds are normal.      Palpations: Abdomen is soft.      Comments: Abdomen is soft and obese but non tender.  No flank tenderness.  No suprapubic pain.    No abdominal guarding and no rebound tenderness.   Genitourinary:     Comments: Patient denies dysuria, no hematuria, denies flank pain.  Nocturia.  Musculoskeletal:         General: Tenderness present. Normal range of motion.      Cervical back: Normal range of motion. Tenderness present.      Comments: Age-related arthritis in the joints.  Osteoarthritis of the hips and knees.  Chronic lumbosacral degenerative disc disease.  Ambulates without any cane or walker.   Skin:     General: Skin is warm and dry.      Comments: There is no bruising, no erythema, no skin lesions noted, no rashes.   Neurological:      General: No focal deficit present.      Mental Status: She is alert and oriented to person, place, and time. Mental status is at baseline.      Comments: No focal neurosensory deficits are noted.  Patient has  diabetic peripheral neuropathy.  Coordination and gait are stable.  Decreased muscle strength upper and lower extremities.   Psychiatric:         Mood and Affect: Mood normal.         Behavior: Behavior normal.         Thought Content: Thought content normal.         Judgment:  Judgment normal.      Comments: Patient has normal mood and affect.  Thought content and judgment are stable.  No signs of vascular dementia.  Behavior is normal.     PLAN : Patient is a 75-year-old female who was in the hospital December 31 through January 8.  She did have elevated troponin and congestive heart failure with bronchial pneumonia.  She has improved and was seen in a follow-up visit today.  Blood pressure and other vitals are stable.  Blood sugar was 167 and it was too soon to recheck her A1c.  Her INR today was 3.2% and she does go to the Coumadin clinic.  She will return in 2 to 3 months for recheck on her A1c and she also has a follow-up visit with cardiology.  She is doing much better after her hospital admission on New Year's Altagracia.  Patient was also given another requisition for her mammogram since the last one apparently was canceled.    Problem List Items Addressed This Visit       Anticoagulated on Coumadin    Relevant Orders    POCT INR manually resulted            Mitesh Simms,

## 2025-04-02 ENCOUNTER — ANTICOAGULATION - WARFARIN VISIT (OUTPATIENT)
Dept: PHARMACY | Facility: CLINIC | Age: 75
End: 2025-04-02
Payer: MEDICARE

## 2025-04-02 DIAGNOSIS — I48.0 PAROXYSMAL ATRIAL FIBRILLATION (MULTI): ICD-10-CM

## 2025-04-02 DIAGNOSIS — Z95.2 MECHANICAL HEART VALVE PRESENT: Primary | ICD-10-CM

## 2025-04-02 NOTE — PROGRESS NOTES
Genet Wilks is a 75 y.o. female with history of Atrial Fibrillation/Atrial Flutter and mechanical heart valve  who presents today for anticoagulation monitoring and adjustment. Last INR 3.2 on 3/31/25 (2 day follow up)    Current dose: 5 mg every Mon; 2.5 mg all other days   INR goal: 2.5-3.5    Last INR: 3.2  - therapeutic    Missed doses since last INR visit: No   Signs or symptoms of clotting and/or stroke: No   Any hematuria, epistaxis, rectal bleeding: No   Changes in diet, alcohol, or tobacco use: No changes - same amount of greens  Reviewed medication list and drug allergies with patient, updated any medication additions or modifications accordingly  Acetaminophen intake: Yes, once in a while  Any medical or dental procedures scheduled at this time:  No     Plan: Patient was instructed to take same weekly dose   Follow-up: 4 weeks - therapeutic    Oly Keller, PharmD  4/2/2025 2:32 PM

## 2025-04-04 ENCOUNTER — PATIENT OUTREACH (OUTPATIENT)
Dept: PRIMARY CARE | Facility: CLINIC | Age: 75
End: 2025-04-04
Payer: MEDICARE

## 2025-04-05 NOTE — PROGRESS NOTES
Unable to reach patient for the 90 days post discharge follow up call.                LVM with call back number for patient to call if needed

## 2025-04-07 ENCOUNTER — APPOINTMENT (OUTPATIENT)
Dept: PHARMACY | Facility: CLINIC | Age: 75
End: 2025-04-07
Payer: MEDICARE

## 2025-04-14 DIAGNOSIS — R68.89 FLU-LIKE SYMPTOMS: ICD-10-CM

## 2025-04-14 RX ORDER — DOXYCYCLINE 100 MG/1
100 CAPSULE ORAL 2 TIMES DAILY
Qty: 20 CAPSULE | Refills: 0 | Status: ON HOLD | OUTPATIENT
Start: 2025-04-14 | End: 2025-04-24

## 2025-04-15 ENCOUNTER — APPOINTMENT (OUTPATIENT)
Dept: PHARMACY | Facility: CLINIC | Age: 75
End: 2025-04-15
Payer: MEDICARE

## 2025-04-16 ENCOUNTER — APPOINTMENT (OUTPATIENT)
Dept: RADIOLOGY | Facility: HOSPITAL | Age: 75
DRG: 190 | End: 2025-04-16
Payer: MEDICARE

## 2025-04-16 ENCOUNTER — HOSPITAL ENCOUNTER (INPATIENT)
Facility: HOSPITAL | Age: 75
DRG: 190 | End: 2025-04-16
Attending: EMERGENCY MEDICINE | Admitting: STUDENT IN AN ORGANIZED HEALTH CARE EDUCATION/TRAINING PROGRAM
Payer: MEDICARE

## 2025-04-16 DIAGNOSIS — J44.1 COPD EXACERBATION (MULTI): Primary | ICD-10-CM

## 2025-04-16 DIAGNOSIS — Z51.81 SUBTHERAPEUTIC ANTICOAGULATION: ICD-10-CM

## 2025-04-16 DIAGNOSIS — Z79.01 SUBTHERAPEUTIC ANTICOAGULATION: ICD-10-CM

## 2025-04-16 PROBLEM — R19.7 DIARRHEA: Status: ACTIVE | Noted: 2025-04-16

## 2025-04-16 PROBLEM — J18.9 BILATERAL PNEUMONIA: Status: ACTIVE | Noted: 2025-04-16

## 2025-04-16 LAB
ALBUMIN SERPL BCP-MCNC: 4.3 G/DL (ref 3.4–5)
ALP SERPL-CCNC: 71 U/L (ref 33–136)
ALT SERPL W P-5'-P-CCNC: 11 U/L (ref 7–45)
ANION GAP SERPL CALC-SCNC: 12 MMOL/L (ref 10–20)
AST SERPL W P-5'-P-CCNC: 20 U/L (ref 9–39)
BASOPHILS # BLD AUTO: 0.04 X10*3/UL (ref 0–0.1)
BASOPHILS NFR BLD AUTO: 0.8 %
BILIRUB SERPL-MCNC: 1.5 MG/DL (ref 0–1.2)
BNP SERPL-MCNC: 146 PG/ML (ref 0–99)
BUN SERPL-MCNC: 19 MG/DL (ref 6–23)
CALCIUM SERPL-MCNC: 9.4 MG/DL (ref 8.6–10.3)
CARDIAC TROPONIN I PNL SERPL HS: 19 NG/L (ref 0–13)
CARDIAC TROPONIN I PNL SERPL HS: 24 NG/L (ref 0–13)
CHLORIDE SERPL-SCNC: 99 MMOL/L (ref 98–107)
CO2 SERPL-SCNC: 29 MMOL/L (ref 21–32)
CREAT SERPL-MCNC: 0.97 MG/DL (ref 0.5–1.05)
EGFRCR SERPLBLD CKD-EPI 2021: 62 ML/MIN/1.73M*2
EOSINOPHIL # BLD AUTO: 0.13 X10*3/UL (ref 0–0.4)
EOSINOPHIL NFR BLD AUTO: 2.7 %
ERYTHROCYTE [DISTWIDTH] IN BLOOD BY AUTOMATED COUNT: 15.2 % (ref 11.5–14.5)
FLUAV RNA RESP QL NAA+PROBE: NOT DETECTED
FLUBV RNA RESP QL NAA+PROBE: NOT DETECTED
GLUCOSE BLD MANUAL STRIP-MCNC: 379 MG/DL (ref 74–99)
GLUCOSE SERPL-MCNC: 155 MG/DL (ref 74–99)
HCT VFR BLD AUTO: 40.3 % (ref 36–46)
HGB BLD-MCNC: 12.2 G/DL (ref 12–16)
IMM GRANULOCYTES # BLD AUTO: 0.02 X10*3/UL (ref 0–0.5)
IMM GRANULOCYTES NFR BLD AUTO: 0.4 % (ref 0–0.9)
INR PPP: 1.7 (ref 0.9–1.1)
LYMPHOCYTES # BLD AUTO: 1.07 X10*3/UL (ref 0.8–3)
LYMPHOCYTES NFR BLD AUTO: 22.4 %
MAGNESIUM SERPL-MCNC: 1.86 MG/DL (ref 1.6–2.4)
MCH RBC QN AUTO: 26.8 PG (ref 26–34)
MCHC RBC AUTO-ENTMCNC: 30.3 G/DL (ref 32–36)
MCV RBC AUTO: 88 FL (ref 80–100)
MONOCYTES # BLD AUTO: 0.71 X10*3/UL (ref 0.05–0.8)
MONOCYTES NFR BLD AUTO: 14.9 %
NEUTROPHILS # BLD AUTO: 2.8 X10*3/UL (ref 1.6–5.5)
NEUTROPHILS NFR BLD AUTO: 58.8 %
NRBC BLD-RTO: 0 /100 WBCS (ref 0–0)
PLATELET # BLD AUTO: 242 X10*3/UL (ref 150–450)
POTASSIUM SERPL-SCNC: 3.5 MMOL/L (ref 3.5–5.3)
PROT SERPL-MCNC: 8.1 G/DL (ref 6.4–8.2)
PROTHROMBIN TIME: 18.6 SECONDS (ref 9.8–12.4)
RBC # BLD AUTO: 4.56 X10*6/UL (ref 4–5.2)
RSV RNA RESP QL NAA+PROBE: NOT DETECTED
SARS-COV-2 RNA RESP QL NAA+PROBE: NOT DETECTED
SODIUM SERPL-SCNC: 136 MMOL/L (ref 136–145)
WBC # BLD AUTO: 4.8 X10*3/UL (ref 4.4–11.3)

## 2025-04-16 PROCEDURE — 84484 ASSAY OF TROPONIN QUANT: CPT | Performed by: EMERGENCY MEDICINE

## 2025-04-16 PROCEDURE — G0378 HOSPITAL OBSERVATION PER HR: HCPCS

## 2025-04-16 PROCEDURE — 83735 ASSAY OF MAGNESIUM: CPT | Performed by: EMERGENCY MEDICINE

## 2025-04-16 PROCEDURE — 99285 EMERGENCY DEPT VISIT HI MDM: CPT | Mod: 25 | Performed by: EMERGENCY MEDICINE

## 2025-04-16 PROCEDURE — 80053 COMPREHEN METABOLIC PANEL: CPT | Performed by: EMERGENCY MEDICINE

## 2025-04-16 PROCEDURE — 82947 ASSAY GLUCOSE BLOOD QUANT: CPT

## 2025-04-16 PROCEDURE — 71046 X-RAY EXAM CHEST 2 VIEWS: CPT | Mod: FOREIGN READ | Performed by: RADIOLOGY

## 2025-04-16 PROCEDURE — 83880 ASSAY OF NATRIURETIC PEPTIDE: CPT | Performed by: EMERGENCY MEDICINE

## 2025-04-16 PROCEDURE — 96376 TX/PRO/DX INJ SAME DRUG ADON: CPT

## 2025-04-16 PROCEDURE — 2500000001 HC RX 250 WO HCPCS SELF ADMINISTERED DRUGS (ALT 637 FOR MEDICARE OP)

## 2025-04-16 PROCEDURE — 36415 COLL VENOUS BLD VENIPUNCTURE: CPT | Performed by: EMERGENCY MEDICINE

## 2025-04-16 PROCEDURE — 71046 X-RAY EXAM CHEST 2 VIEWS: CPT

## 2025-04-16 PROCEDURE — 99223 1ST HOSP IP/OBS HIGH 75: CPT

## 2025-04-16 PROCEDURE — 2500000004 HC RX 250 GENERAL PHARMACY W/ HCPCS (ALT 636 FOR OP/ED): Mod: JZ

## 2025-04-16 PROCEDURE — 96374 THER/PROPH/DIAG INJ IV PUSH: CPT

## 2025-04-16 PROCEDURE — 94640 AIRWAY INHALATION TREATMENT: CPT

## 2025-04-16 PROCEDURE — 2500000002 HC RX 250 W HCPCS SELF ADMINISTERED DRUGS (ALT 637 FOR MEDICARE OP, ALT 636 FOR OP/ED): Performed by: EMERGENCY MEDICINE

## 2025-04-16 PROCEDURE — 85610 PROTHROMBIN TIME: CPT | Performed by: EMERGENCY MEDICINE

## 2025-04-16 PROCEDURE — 85025 COMPLETE CBC W/AUTO DIFF WBC: CPT | Performed by: EMERGENCY MEDICINE

## 2025-04-16 PROCEDURE — 2500000004 HC RX 250 GENERAL PHARMACY W/ HCPCS (ALT 636 FOR OP/ED): Mod: JZ | Performed by: EMERGENCY MEDICINE

## 2025-04-16 PROCEDURE — 96375 TX/PRO/DX INJ NEW DRUG ADDON: CPT

## 2025-04-16 PROCEDURE — 87637 SARSCOV2&INF A&B&RSV AMP PRB: CPT

## 2025-04-16 RX ORDER — LEVOTHYROXINE SODIUM 75 UG/1
75 TABLET ORAL
Status: DISCONTINUED | OUTPATIENT
Start: 2025-04-17 | End: 2025-04-23 | Stop reason: HOSPADM

## 2025-04-16 RX ORDER — FERROUS SULFATE 325(65) MG
65 TABLET ORAL
Status: DISCONTINUED | OUTPATIENT
Start: 2025-04-17 | End: 2025-04-23 | Stop reason: HOSPADM

## 2025-04-16 RX ORDER — INSULIN LISPRO 100 [IU]/ML
0-10 INJECTION, SOLUTION INTRAVENOUS; SUBCUTANEOUS
Status: DISCONTINUED | OUTPATIENT
Start: 2025-04-17 | End: 2025-04-17

## 2025-04-16 RX ORDER — AMLODIPINE BESYLATE 5 MG/1
5 TABLET ORAL DAILY
Status: DISCONTINUED | OUTPATIENT
Start: 2025-04-17 | End: 2025-04-23 | Stop reason: HOSPADM

## 2025-04-16 RX ORDER — IPRATROPIUM BROMIDE AND ALBUTEROL SULFATE 2.5; .5 MG/3ML; MG/3ML
3 SOLUTION RESPIRATORY (INHALATION)
Status: DISCONTINUED | OUTPATIENT
Start: 2025-04-16 | End: 2025-04-17

## 2025-04-16 RX ORDER — BENZONATATE 100 MG/1
100 CAPSULE ORAL 3 TIMES DAILY PRN
Status: DISCONTINUED | OUTPATIENT
Start: 2025-04-16 | End: 2025-04-23 | Stop reason: HOSPADM

## 2025-04-16 RX ORDER — AZITHROMYCIN 250 MG/1
TABLET, FILM COATED ORAL
Status: DISPENSED
Start: 2025-04-16 | End: 2025-04-17

## 2025-04-16 RX ORDER — ACETAMINOPHEN 325 MG/1
650 TABLET ORAL EVERY 6 HOURS PRN
Status: DISCONTINUED | OUTPATIENT
Start: 2025-04-16 | End: 2025-04-23 | Stop reason: HOSPADM

## 2025-04-16 RX ORDER — FUROSEMIDE 10 MG/ML
40 INJECTION INTRAMUSCULAR; INTRAVENOUS ONCE
Status: COMPLETED | OUTPATIENT
Start: 2025-04-16 | End: 2025-04-16

## 2025-04-16 RX ORDER — ATORVASTATIN CALCIUM 20 MG/1
20 TABLET, FILM COATED ORAL NIGHTLY
Status: DISCONTINUED | OUTPATIENT
Start: 2025-04-16 | End: 2025-04-23 | Stop reason: HOSPADM

## 2025-04-16 RX ORDER — AZITHROMYCIN 250 MG/1
500 TABLET, FILM COATED ORAL ONCE
Status: COMPLETED | OUTPATIENT
Start: 2025-04-16 | End: 2025-04-16

## 2025-04-16 RX ORDER — FERROUS SULFATE 325(65) MG
325 TABLET ORAL
COMMUNITY

## 2025-04-16 RX ORDER — ALBUTEROL SULFATE 0.83 MG/ML
7.5 SOLUTION RESPIRATORY (INHALATION) ONCE
Status: COMPLETED | OUTPATIENT
Start: 2025-04-16 | End: 2025-04-16

## 2025-04-16 RX ORDER — FUROSEMIDE 10 MG/ML
INJECTION INTRAMUSCULAR; INTRAVENOUS
Status: DISPENSED
Start: 2025-04-16 | End: 2025-04-17

## 2025-04-16 RX ORDER — CHOLECALCIFEROL (VITAMIN D3) 25 MCG
50 TABLET ORAL DAILY
Status: DISCONTINUED | OUTPATIENT
Start: 2025-04-17 | End: 2025-04-23 | Stop reason: HOSPADM

## 2025-04-16 RX ORDER — ALBUTEROL SULFATE 0.83 MG/ML
2.5 SOLUTION RESPIRATORY (INHALATION) EVERY 4 HOURS PRN
Status: DISCONTINUED | OUTPATIENT
Start: 2025-04-16 | End: 2025-04-17

## 2025-04-16 RX ORDER — IPRATROPIUM BROMIDE AND ALBUTEROL SULFATE 2.5; .5 MG/3ML; MG/3ML
9 SOLUTION RESPIRATORY (INHALATION) ONCE
Status: COMPLETED | OUTPATIENT
Start: 2025-04-16 | End: 2025-04-16

## 2025-04-16 RX ORDER — FUROSEMIDE 10 MG/ML
20 INJECTION INTRAMUSCULAR; INTRAVENOUS 2 TIMES DAILY
Status: DISCONTINUED | OUTPATIENT
Start: 2025-04-16 | End: 2025-04-23 | Stop reason: HOSPADM

## 2025-04-16 RX ORDER — DEXTROSE 50 % IN WATER (D50W) INTRAVENOUS SYRINGE
12.5
Status: DISCONTINUED | OUTPATIENT
Start: 2025-04-16 | End: 2025-04-23 | Stop reason: HOSPADM

## 2025-04-16 RX ORDER — ASPIRIN 81 MG/1
81 TABLET ORAL
Status: DISCONTINUED | OUTPATIENT
Start: 2025-04-17 | End: 2025-04-23 | Stop reason: HOSPADM

## 2025-04-16 RX ORDER — WARFARIN 2.5 MG/1
2.5 TABLET ORAL
Status: DISCONTINUED | OUTPATIENT
Start: 2025-04-17 | End: 2025-04-23 | Stop reason: HOSPADM

## 2025-04-16 RX ORDER — ONDANSETRON HYDROCHLORIDE 2 MG/ML
4 INJECTION, SOLUTION INTRAVENOUS EVERY 6 HOURS PRN
Status: DISCONTINUED | OUTPATIENT
Start: 2025-04-16 | End: 2025-04-23 | Stop reason: HOSPADM

## 2025-04-16 RX ORDER — TALC
3 POWDER (GRAM) TOPICAL NIGHTLY PRN
Status: DISCONTINUED | OUTPATIENT
Start: 2025-04-16 | End: 2025-04-23 | Stop reason: HOSPADM

## 2025-04-16 RX ORDER — WARFARIN SODIUM 5 MG/1
5 TABLET ORAL
Status: DISCONTINUED | OUTPATIENT
Start: 2025-04-21 | End: 2025-04-18

## 2025-04-16 RX ORDER — DEXTROSE 50 % IN WATER (D50W) INTRAVENOUS SYRINGE
25
Status: DISCONTINUED | OUTPATIENT
Start: 2025-04-16 | End: 2025-04-23 | Stop reason: HOSPADM

## 2025-04-16 RX ORDER — METOPROLOL SUCCINATE 25 MG/1
25 TABLET, EXTENDED RELEASE ORAL DAILY
Status: DISCONTINUED | OUTPATIENT
Start: 2025-04-17 | End: 2025-04-23 | Stop reason: HOSPADM

## 2025-04-16 RX ADMIN — METHYLPREDNISOLONE SODIUM SUCCINATE 40 MG: 40 INJECTION, POWDER, FOR SOLUTION INTRAMUSCULAR; INTRAVENOUS at 22:32

## 2025-04-16 RX ADMIN — AZITHROMYCIN DIHYDRATE 500 MG: 250 TABLET ORAL at 14:11

## 2025-04-16 RX ADMIN — IPRATROPIUM BROMIDE AND ALBUTEROL SULFATE 9 ML: .5; 3 SOLUTION RESPIRATORY (INHALATION) at 12:41

## 2025-04-16 RX ADMIN — ALBUTEROL SULFATE 7.5 MG: 2.5 SOLUTION RESPIRATORY (INHALATION) at 12:45

## 2025-04-16 RX ADMIN — FUROSEMIDE 20 MG: 10 INJECTION, SOLUTION INTRAMUSCULAR; INTRAVENOUS at 22:32

## 2025-04-16 RX ADMIN — ATORVASTATIN CALCIUM 20 MG: 20 TABLET, FILM COATED ORAL at 22:32

## 2025-04-16 RX ADMIN — METHYLPREDNISOLONE SODIUM SUCCINATE 125 MG: 125 INJECTION, POWDER, FOR SOLUTION INTRAMUSCULAR; INTRAVENOUS at 12:22

## 2025-04-16 RX ADMIN — FUROSEMIDE 40 MG: 10 INJECTION, SOLUTION INTRAVENOUS at 14:11

## 2025-04-16 ASSESSMENT — COLUMBIA-SUICIDE SEVERITY RATING SCALE - C-SSRS
2. HAVE YOU ACTUALLY HAD ANY THOUGHTS OF KILLING YOURSELF?: NO
6. HAVE YOU EVER DONE ANYTHING, STARTED TO DO ANYTHING, OR PREPARED TO DO ANYTHING TO END YOUR LIFE?: NO
1. IN THE PAST MONTH, HAVE YOU WISHED YOU WERE DEAD OR WISHED YOU COULD GO TO SLEEP AND NOT WAKE UP?: NO

## 2025-04-16 ASSESSMENT — LIFESTYLE VARIABLES
TOTAL SCORE: 0
HAVE PEOPLE ANNOYED YOU BY CRITICIZING YOUR DRINKING: NO
HAVE YOU EVER FELT YOU SHOULD CUT DOWN ON YOUR DRINKING: NO
EVER FELT BAD OR GUILTY ABOUT YOUR DRINKING: NO
EVER HAD A DRINK FIRST THING IN THE MORNING TO STEADY YOUR NERVES TO GET RID OF A HANGOVER: NO

## 2025-04-16 NOTE — ED PROVIDER NOTES
Patient's care was completed by prior ED doctor Janusz.  Plan was for admission due to COPD exacerbation in the setting of having provided breathing therapy and antibiotics.  Patient has been using 2 L of oxygen at home for 24 hours instead of at nighttime only as she usually is since she has been recently sick.  Patient agreed with plan of admission to hospital.  I talked to Dr. Hansen who accepted to continue the care of this patient.    Labs Reviewed   COMPREHENSIVE METABOLIC PANEL - Abnormal       Result Value    Glucose 155 (*)     Sodium 136      Potassium 3.5      Chloride 99      Bicarbonate 29      Anion Gap 12      Urea Nitrogen 19      Creatinine 0.97      eGFR 62      Calcium 9.4      Albumin 4.3      Alkaline Phosphatase 71      Total Protein 8.1      AST 20      Bilirubin, Total 1.5 (*)     ALT 11     CBC WITH AUTO DIFFERENTIAL - Abnormal    WBC 4.8      nRBC 0.0      RBC 4.56      Hemoglobin 12.2      Hematocrit 40.3      MCV 88      MCH 26.8      MCHC 30.3 (*)     RDW 15.2 (*)     Platelets 242      Neutrophils % 58.8      Immature Granulocytes %, Automated 0.4      Lymphocytes % 22.4      Monocytes % 14.9      Eosinophils % 2.7      Basophils % 0.8      Neutrophils Absolute 2.80      Immature Granulocytes Absolute, Automated 0.02      Lymphocytes Absolute 1.07      Monocytes Absolute 0.71      Eosinophils Absolute 0.13      Basophils Absolute 0.04     B-TYPE NATRIURETIC PEPTIDE - Abnormal     (*)     Narrative:        <100 pg/mL - Heart failure unlikely  100-299 pg/mL - Intermediate probability of acute heart                  failure exacerbation. Correlate with clinical                  context and patient history.    >=300 pg/mL - Heart Failure likely. Correlate with clinical                  context and patient history.    BNP testing is performed using different testing methodology at [facility] than at other [facility] hospitals. Direct result comparisons should only be made  within the same method.      SERIAL TROPONIN-INITIAL - Abnormal    Troponin I, High Sensitivity 24 (*)     Narrative:     Less than 99th percentile of normal range cutoff-  Female and children under 18 years old <14 ng/L; Male <21 ng/L: Negative  Repeat testing should be performed if clinically indicated.     Female and children under 18 years old 14-50 ng/L; Male 21-50 ng/L:  Consistent with possible cardiac damage and possible increased clinical   risk. Serial measurements may help to assess extent of myocardial damage.     >50 ng/L: Consistent with cardiac damage, increased clinical risk and  myocardial infarction. Serial measurements may help assess extent of   myocardial damage.      NOTE: Children less than 1 year old may have higher baseline troponin   levels and results should be interpreted in conjunction with the overall   clinical context.     NOTE: Troponin I testing is performed using a different   testing methodology at [facility] than at other   [facility]. Direct result comparisons should only   be made within the same method.   SERIAL TROPONIN, 1 HOUR - Abnormal    Troponin I, High Sensitivity 19 (*)     Narrative:     Less than 99th percentile of normal range cutoff-  Female and children under 18 years old <14 ng/L; Male <21 ng/L: Negative  Repeat testing should be performed if clinically indicated.     Female and children under 18 years old 14-50 ng/L; Male 21-50 ng/L:  Consistent with possible cardiac damage and possible increased clinical   risk. Serial measurements may help to assess extent of myocardial damage.     >50 ng/L: Consistent with cardiac damage, increased clinical risk and  myocardial infarction. Serial measurements may help assess extent of   myocardial damage.      NOTE: Children less than 1 year old may have higher baseline troponin   levels and results should be interpreted in conjunction with the overall   clinical context.     NOTE: Troponin I testing is  performed using a different   testing methodology at [facility] than at other   [facility]. Direct result comparisons should only   be made within the same method.   PROTIME-INR - Abnormal    Protime 18.6 (*)     INR 1.7 (*)    MAGNESIUM - Normal    Magnesium 1.86     TROPONIN SERIES- (INITIAL, 1 HR)    Narrative:     The following orders were created for panel order Troponin I Series, High Sensitivity (0, 1 HR).  Procedure                               Abnormality         Status                     ---------                               -----------         ------                     Troponin I, High Sensiti...[[id number]]  Abnormal            Final result               Troponin, High Sensitivi...[[id number]]  Abnormal            Final result                 Please view results for these tests on the individual orders.        XR chest 2 views   Final Result   Mild vascular congestion.   Signed by Alex Prado MD           Medications   ipratropium-albuteroL (Duo-Neb) 0.5-2.5 mg/3 mL nebulizer solution 9 mL (9 mL nebulization Given 4/16/25 1241)   albuterol 2.5 mg /3 mL (0.083 %) nebulizer solution 7.5 mg (7.5 mg nebulization Given 4/16/25 1245)   methylPREDNISolone sod succinate (SOLU-Medrol) injection 125 mg (125 mg intravenous Given 4/16/25 1222)   azithromycin (Zithromax) tablet 500 mg (500 mg oral Given 4/16/25 1411)   furosemide (Lasix) injection 40 mg (40 mg intravenous Given 4/16/25 1411)         Shahnaz Dai MD  04/16/25 1946

## 2025-04-16 NOTE — ED PROVIDER NOTES
HPI   Chief Complaint   Patient presents with    Shortness of Breath     Pt coming in for shortness of breath. Recently dealing with a stomach bug and having lots of diarrhea. Has a hx of CHF. Pt coming from home. Has been using her breathing treatments every 4 hours which is not her usual. Has been needing to use oxygen at home after walking around and that's new. Has audible wheezing. Was put on doxycycline Monday.          HPI  The patient is a 75-year-old female with a history of COPD on nighttime oxygen, A-fib on anticoagulation, CAD, and heart failure with preserved EF here for a 5-day history of shortness of breath.  Patient is also experiencing a productive cough of green sputum that started 6 days ago.  She denies any fevers or chills.  She denies chest pain.  She has been using her inhaler and nebulized treatments at home without relief.  She denies any lower extremity edema.  She was recently sick with gastroenteritis, however that has since resolved.  She denies any abdominal pain.      Patient History   Medical History[1]  Surgical History[2]  Family History[3]  Social History[4]    Physical Exam   ED Triage Vitals 04/16/25 1107   Temperature Heart Rate Respirations BP   36.1 °C (97 °F) 79 16 138/62      Pulse Ox Temp Source Heart Rate Source Patient Position   (!) 93 % Tympanic Monitor Sitting      BP Location FiO2 (%)     Right arm --       Physical Exam  General: Alert and in mild respiratory distress with audible wheeze.  HEENT:  EOMI. pupils equal and round.  No conjunctival injection or hemorrhage.  Respiratory: Tachypneic and speaking in short 1-2 word sentences, equal breath sounds bilaterally.  Diminished air entry.  End expiratory wheeze in all lung fields with prolonged expiratory phase  Cardiovascular: Regular rate and regular rhythm.  Equal pulses in all 4 extremities.  No lower extremity edema or asymmetry.  Skin: Warm and well-perfused.  Neurologic: Alert and answering questions  appropriately.  Speech is normal.      ED Course & MDM   Diagnoses as of 04/16/25 4794   COPD exacerbation (Multi)                 No data recorded     [place] Coma Scale Score: 15 (04/16/25 1111 : Omkar Oliva RN)                           Medical Decision Making  Patient is a 75-year-old female here for shortness of breath and productive cough.  She is borderline hypertensive, nontachycardic, and afebrile.  Patient is speaking in short sentences with borderline hypoxia on room air and an expiratory wheeze in all lung fields.  Patient does not have any evidence of volume overload on examination, therefore her presentation seems most consistent with COPD exacerbation.  Patient was placed on 2 L nasal cannula for comfort.  She was given a continuous nebulized treatment as well as Solu-Medrol 125 mg.  Chest x-ray will be obtained to evaluate for pneumonia given productive cough.  I have a low suspicion for cardiac etiology as a cause of the patient's symptoms at this time, however laboratory studies will be obtained.    Chest x-ray demonstrates mild vascular congestion.  Patient will be given 40 mg IV Lasix for this, however I do not feel this is the etiology for her shortness of breath and wheeze.    Laboratory studies demonstrate a CMP within normal limits except for a hyperglycemia of 155 with normal anion gap and bicarb.  CBC is unremarkable.  INR is subtherapeutic at 1.7.  BNP is mildly elevated at 146, however compared to prior this appears to be stable.  Isolated troponin is elevated at 24, however this is a chronic and stable elevation as compared to 3 months ago.    On reevaluation the patient continues to have significant and expiratory and inspiratory wheezing, therefore I do feel she would benefit from admission.  Because of the sputum production and color she will also be covered with azithromycin        Procedure  Procedures       [1]   Past Medical History:  Diagnosis Date    Long term (current) use  of anticoagulants     Long-term (current) use of anticoagulants    Old myocardial infarction     History of non-ST elevation myocardial infarction (NSTEMI)    Personal history of (corrected) congenital malformations of heart and circulatory system     History of congenital anomaly of heart    Personal history of other diseases of the circulatory system     History of rheumatic fever    Personal history of other diseases of the circulatory system     History of mitral valve prolapse    Personal history of other diseases of the circulatory system     History of atrial fibrillation    Personal history of other diseases of the musculoskeletal system and connective tissue     History of back pain    Personal history of other diseases of the nervous system and sense organs     History of carpal tunnel syndrome    Personal history of other diseases of the respiratory system     History of asthma    Personal history of other endocrine, nutritional and metabolic disease     History of morbid obesity    Personal history of other endocrine, nutritional and metabolic disease     History of hypothyroidism    Personal history of other endocrine, nutritional and metabolic disease     History of hypercholesterolemia    Personal history of other specified conditions     History of chest pain    Presence of other heart-valve replacement     Heart valve replaced by other means    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    Mechanical heart valve present   [2]   Past Surgical History:  Procedure Laterality Date    CARDIAC CATHETERIZATION  06/15/2018    Cardiac Cath Procedure Outcome:    CARDIAC SURGERY  11/13/2014    Heart Surgery    KNEE SURGERY  06/15/2018    Knee Surgery    OTHER SURGICAL HISTORY  11/13/2014    Surgery    OTHER SURGICAL HISTORY  11/13/2014    Colposcopy Cervix With Biopsy(S)    TONSILLECTOMY  11/13/2014    Tonsillectomy    TUBAL LIGATION  11/13/2014    Tubal Ligation   [3]   Family History  Problem Relation  Name Age of Onset    Diabetes Mother      Stroke Father      Hypertension Father      Diabetes Sister      Breast cancer Sister      Throat cancer Brother      Diabetes Brother      Other (S/P CABG x3) Brother      Cancer Other sibling    [4]   Social History  Tobacco Use    Smoking status: Never     Passive exposure: Never    Smokeless tobacco: Never   Vaping Use    Vaping status: Never Used   Substance Use Topics    Alcohol use: Not Currently    Drug use: Never        Sarai Boudreaux MD  04/16/25 6585

## 2025-04-16 NOTE — PROGRESS NOTES
Pharmacy Medication History Review    Genet Wilks is a 75 y.o. female admitted for No Principal Problem: There is no principal problem currently on the Problem List. Please update the Problem List and refresh.. Pharmacy reviewed the patient's [id number] medications and allergies for accuracy.    The list below reflectives the updated PTA list. Please review each medication in order reconciliation for additional clarification and justification.  Prior to Admission medications    Medication Sig Start Date End Date Taking? Authorizing Provider   albuterol 0.63 mg/3 mL nebulizer solution Take 3 mL (0.63 mg) by nebulization 3 times a day. 1/2/24  Yes Mitesh Simms, DO   albuterol 90 mcg/actuation inhaler Inhale 2 puffs every 6 hours if needed for wheezing or shortness of breath. 1/10/25  Yes Mitesh Simms DO   amLODIPine (Norvasc) 5 mg tablet Take 1 tablet by mouth once daily 9/18/24  Yes Mitesh Simms DO   aspirin 81 mg EC tablet Take 1 tablet (81 mg) by mouth once daily at noon. Take before meals.. 8/21/19  Yes Historical Provider, MD   atorvastatin (Lipitor) 20 mg tablet TAKE 1 TABLET BY MOUTH AT BEDTIME 5/24/24  Yes Lul Day MD   benzonatate (Tessalon) 100 mg capsule Take 1 capsule (100 mg) by mouth 3 times a day as needed for cough. 1/8/25  Yes Olya Craig PA-C   blood sugar diagnostic (OneTouch Ultra Test) strip USE 1 STRIP TO CHECK GLUCOSE ONCE DAILY 8/9/24  Yes Mitesh Simms DO   cholecalciferol (Vitamin D-3) 50 MCG (2000 UT) tablet Take 1 tablet (2,000 Units) by mouth once daily.   Yes Historical Provider, MD   doxycycline (Vibramycin) 100 mg capsule Take 1 capsule (100 mg) by mouth 2 times a day for 10 days. Take with at least 8 ounces (large glass) of water, do not lie down for 30 minutes after 4/14/25 4/24/25 Yes Mitesh Simms DO   ferrous sulfate (iron) 325 mg (65 mg elemental) tablet Take 1 tablet (325 mg) by mouth once daily with breakfast.   Yes Historical  Provider, MD   furosemide (Lasix) 40 mg tablet Take 1 tablet (40 mg) by mouth once daily as needed (swelling).  Patient taking differently: Take 1 tablet (40 mg) by mouth once daily. 1/24/25  Yes Mitesh Simms DO   glimepiride (Amaryl) 2 mg tablet Increase to BID for elevated blood sugar  Patient taking differently: Take 1 tablet (2 mg) by mouth 2 times a day. Increase to BID for elevated blood sugar 2/3/25  Yes Mitesh Simms DO   lancets (OneTouch Delica Plus Lancet) 33 gauge misc USE 1 TO CHECK GLUCOSE ONCE DAILY 2/13/24  Yes Mitesh Simms DO   levothyroxine (Synthroid, Levoxyl) 75 mcg tablet TAKE 1 TABLET BY MOUTH ONCE DAILY AS DIRECTED 1/31/25  Yes Mitesh Simms DO   metoprolol succinate XL (Toprol-XL) 25 mg 24 hr tablet Take 1 tablet (25 mg) by mouth once daily. 1/10/25  Yes Mitesh Simms DO   oxygen (O2) gas therapy Inhale 3 L/min once daily as needed.   Yes Historical Provider, MD   warfarin (Coumadin) 5 mg tablet Take 5 mg (1 tablet) every Monday and 2.5 mg (1/2 tablet) all other days or as directed by the anticoagulation clinic. 1/23/25  Yes Mitesh Simms DO   enoxaparin (Lovenox) 100 mg/mL syringe Inject 0.9 mL (90 mg) under the skin every 12 hours. As directed per coumadin clinic 3/11/25  no Lul Day MD        The list below reflectives the updated allergy list. Please review each documented allergy for additional clarification and justification.  Allergies  Reviewed by Omkar Oliva RN on 4/16/2025        Severity Reactions Comments    Penicillins Medium Hives, Itching     Prednisone Medium Itching     Mushroom Low Hives, Itching     Simvastatin Low Rash             Below are additional concerns with the patient's PTA list.      Flor Bertrand

## 2025-04-16 NOTE — ED TRIAGE NOTES
Pt coming in for shortness of breath. Recently dealing with a stomach bug and having lots of diarrhea. Has a hx of CHF. Pt coming from home. Has been using her breathing treatments every 4 hours which is not her usual. Has been needing to use oxygen at home after walking around and that's new. Has audible wheezing.

## 2025-04-17 PROBLEM — J44.1 COPD EXACERBATION (MULTI): Status: ACTIVE | Noted: 2025-04-17

## 2025-04-17 LAB
ALBUMIN SERPL BCP-MCNC: 3.9 G/DL (ref 3.4–5)
ALP SERPL-CCNC: 63 U/L (ref 33–136)
ALT SERPL W P-5'-P-CCNC: 10 U/L (ref 7–45)
ANION GAP SERPL CALC-SCNC: 12 MMOL/L (ref 10–20)
APTT PPP: 27 SECONDS (ref 26–36)
AST SERPL W P-5'-P-CCNC: 16 U/L (ref 9–39)
BILIRUB SERPL-MCNC: 1 MG/DL (ref 0–1.2)
BUN SERPL-MCNC: 28 MG/DL (ref 6–23)
CALCIUM SERPL-MCNC: 9.1 MG/DL (ref 8.6–10.3)
CARDIAC TROPONIN I PNL SERPL HS: 11 NG/L (ref 0–13)
CHLORIDE SERPL-SCNC: 100 MMOL/L (ref 98–107)
CO2 SERPL-SCNC: 27 MMOL/L (ref 21–32)
CREAT SERPL-MCNC: 0.94 MG/DL (ref 0.5–1.05)
EGFRCR SERPLBLD CKD-EPI 2021: 64 ML/MIN/1.73M*2
GLUCOSE BLD MANUAL STRIP-MCNC: 231 MG/DL (ref 74–99)
GLUCOSE BLD MANUAL STRIP-MCNC: 232 MG/DL (ref 74–99)
GLUCOSE BLD MANUAL STRIP-MCNC: 286 MG/DL (ref 74–99)
GLUCOSE BLD MANUAL STRIP-MCNC: 317 MG/DL (ref 74–99)
GLUCOSE BLD MANUAL STRIP-MCNC: 324 MG/DL (ref 74–99)
GLUCOSE SERPL-MCNC: 290 MG/DL (ref 74–99)
HOLD SPECIMEN: NORMAL
INR PPP: 1.8 (ref 0.9–1.1)
LEGIONELLA AG UR QL: NEGATIVE
MAGNESIUM SERPL-MCNC: 1.95 MG/DL (ref 1.6–2.4)
POTASSIUM SERPL-SCNC: 3.8 MMOL/L (ref 3.5–5.3)
PROCALCITONIN SERPL-MCNC: 0.16 NG/ML
PROT SERPL-MCNC: 7.5 G/DL (ref 6.4–8.2)
PROTHROMBIN TIME: 19.9 SECONDS (ref 9.8–12.4)
S PNEUM AG UR QL: NEGATIVE
SODIUM SERPL-SCNC: 135 MMOL/L (ref 136–145)
TSH SERPL-ACNC: 0.88 MIU/L (ref 0.44–3.98)

## 2025-04-17 PROCEDURE — 80053 COMPREHEN METABOLIC PANEL: CPT

## 2025-04-17 PROCEDURE — 84484 ASSAY OF TROPONIN QUANT: CPT

## 2025-04-17 PROCEDURE — 99223 1ST HOSP IP/OBS HIGH 75: CPT | Performed by: STUDENT IN AN ORGANIZED HEALTH CARE EDUCATION/TRAINING PROGRAM

## 2025-04-17 PROCEDURE — 1200000002 HC GENERAL ROOM WITH TELEMETRY DAILY

## 2025-04-17 PROCEDURE — 2500000002 HC RX 250 W HCPCS SELF ADMINISTERED DRUGS (ALT 637 FOR MEDICARE OP, ALT 636 FOR OP/ED): Mod: JZ | Performed by: STUDENT IN AN ORGANIZED HEALTH CARE EDUCATION/TRAINING PROGRAM

## 2025-04-17 PROCEDURE — 2500000001 HC RX 250 WO HCPCS SELF ADMINISTERED DRUGS (ALT 637 FOR MEDICARE OP)

## 2025-04-17 PROCEDURE — 2500000002 HC RX 250 W HCPCS SELF ADMINISTERED DRUGS (ALT 637 FOR MEDICARE OP, ALT 636 FOR OP/ED): Mod: JZ

## 2025-04-17 PROCEDURE — 36415 COLL VENOUS BLD VENIPUNCTURE: CPT

## 2025-04-17 PROCEDURE — 99222 1ST HOSP IP/OBS MODERATE 55: CPT | Performed by: STUDENT IN AN ORGANIZED HEALTH CARE EDUCATION/TRAINING PROGRAM

## 2025-04-17 PROCEDURE — 87205 SMEAR GRAM STAIN: CPT | Mod: PARLAB

## 2025-04-17 PROCEDURE — 97165 OT EVAL LOW COMPLEX 30 MIN: CPT | Mod: GO

## 2025-04-17 PROCEDURE — 82947 ASSAY GLUCOSE BLOOD QUANT: CPT

## 2025-04-17 PROCEDURE — 85610 PROTHROMBIN TIME: CPT

## 2025-04-17 PROCEDURE — 84443 ASSAY THYROID STIM HORMONE: CPT

## 2025-04-17 PROCEDURE — 94640 AIRWAY INHALATION TREATMENT: CPT

## 2025-04-17 PROCEDURE — 36415 COLL VENOUS BLD VENIPUNCTURE: CPT | Performed by: STUDENT IN AN ORGANIZED HEALTH CARE EDUCATION/TRAINING PROGRAM

## 2025-04-17 PROCEDURE — 87449 NOS EACH ORGANISM AG IA: CPT | Mod: PARLAB

## 2025-04-17 PROCEDURE — 97161 PT EVAL LOW COMPLEX 20 MIN: CPT | Mod: GP

## 2025-04-17 PROCEDURE — 87899 AGENT NOS ASSAY W/OPTIC: CPT | Mod: PARLAB

## 2025-04-17 PROCEDURE — 2500000002 HC RX 250 W HCPCS SELF ADMINISTERED DRUGS (ALT 637 FOR MEDICARE OP, ALT 636 FOR OP/ED): Performed by: NURSE PRACTITIONER

## 2025-04-17 PROCEDURE — 83735 ASSAY OF MAGNESIUM: CPT

## 2025-04-17 PROCEDURE — 84145 PROCALCITONIN (PCT): CPT | Mod: PARLAB

## 2025-04-17 PROCEDURE — 2500000004 HC RX 250 GENERAL PHARMACY W/ HCPCS (ALT 636 FOR OP/ED): Mod: JZ

## 2025-04-17 RX ORDER — INSULIN LISPRO 100 [IU]/ML
0-10 INJECTION, SOLUTION INTRAVENOUS; SUBCUTANEOUS
Status: DISCONTINUED | OUTPATIENT
Start: 2025-04-17 | End: 2025-04-23 | Stop reason: HOSPADM

## 2025-04-17 RX ORDER — ALBUTEROL SULFATE 0.83 MG/ML
2.5 SOLUTION RESPIRATORY (INHALATION) EVERY 2 HOUR PRN
Status: DISCONTINUED | OUTPATIENT
Start: 2025-04-17 | End: 2025-04-23 | Stop reason: HOSPADM

## 2025-04-17 RX ORDER — IPRATROPIUM BROMIDE AND ALBUTEROL SULFATE 2.5; .5 MG/3ML; MG/3ML
3 SOLUTION RESPIRATORY (INHALATION)
Status: DISCONTINUED | OUTPATIENT
Start: 2025-04-17 | End: 2025-04-23 | Stop reason: HOSPADM

## 2025-04-17 RX ADMIN — AZITHROMYCIN MONOHYDRATE 500 MG: 500 INJECTION, POWDER, LYOPHILIZED, FOR SOLUTION INTRAVENOUS at 10:11

## 2025-04-17 RX ADMIN — METHYLPREDNISOLONE SODIUM SUCCINATE 40 MG: 40 INJECTION, POWDER, FOR SOLUTION INTRAMUSCULAR; INTRAVENOUS at 16:15

## 2025-04-17 RX ADMIN — INSULIN LISPRO 8 UNITS: 100 INJECTION, SOLUTION INTRAVENOUS; SUBCUTANEOUS at 12:55

## 2025-04-17 RX ADMIN — IPRATROPIUM BROMIDE AND ALBUTEROL SULFATE 3 ML: .5; 3 SOLUTION RESPIRATORY (INHALATION) at 22:55

## 2025-04-17 RX ADMIN — METOPROLOL SUCCINATE 25 MG: 25 TABLET, EXTENDED RELEASE ORAL at 08:55

## 2025-04-17 RX ADMIN — LEVOTHYROXINE SODIUM 75 MCG: 0.07 TABLET ORAL at 06:22

## 2025-04-17 RX ADMIN — METHYLPREDNISOLONE SODIUM SUCCINATE 40 MG: 40 INJECTION, POWDER, FOR SOLUTION INTRAMUSCULAR; INTRAVENOUS at 06:29

## 2025-04-17 RX ADMIN — INSULIN LISPRO 8 UNITS: 100 INJECTION, SOLUTION INTRAVENOUS; SUBCUTANEOUS at 02:42

## 2025-04-17 RX ADMIN — IPRATROPIUM BROMIDE AND ALBUTEROL SULFATE 3 ML: .5; 3 SOLUTION RESPIRATORY (INHALATION) at 02:34

## 2025-04-17 RX ADMIN — METHYLPREDNISOLONE SODIUM SUCCINATE 40 MG: 40 INJECTION, POWDER, FOR SOLUTION INTRAMUSCULAR; INTRAVENOUS at 20:55

## 2025-04-17 RX ADMIN — FUROSEMIDE 20 MG: 10 INJECTION, SOLUTION INTRAMUSCULAR; INTRAVENOUS at 20:55

## 2025-04-17 RX ADMIN — IPRATROPIUM BROMIDE AND ALBUTEROL SULFATE 3 ML: .5; 3 SOLUTION RESPIRATORY (INHALATION) at 07:31

## 2025-04-17 RX ADMIN — FUROSEMIDE 20 MG: 10 INJECTION, SOLUTION INTRAMUSCULAR; INTRAVENOUS at 09:00

## 2025-04-17 RX ADMIN — INSULIN LISPRO 4 UNITS: 100 INJECTION, SOLUTION INTRAVENOUS; SUBCUTANEOUS at 18:58

## 2025-04-17 RX ADMIN — INSULIN LISPRO 4 UNITS: 100 INJECTION, SOLUTION INTRAVENOUS; SUBCUTANEOUS at 21:13

## 2025-04-17 RX ADMIN — INSULIN LISPRO 6 UNITS: 100 INJECTION, SOLUTION INTRAVENOUS; SUBCUTANEOUS at 08:38

## 2025-04-17 RX ADMIN — FERROUS SULFATE TAB 325 MG (65 MG ELEMENTAL FE) 325 MG: 325 (65 FE) TAB at 08:47

## 2025-04-17 RX ADMIN — WARFARIN SODIUM 2.5 MG: 2.5 TABLET ORAL at 16:01

## 2025-04-17 RX ADMIN — Medication 50 MCG: at 08:47

## 2025-04-17 RX ADMIN — ATORVASTATIN CALCIUM 20 MG: 20 TABLET, FILM COATED ORAL at 20:55

## 2025-04-17 RX ADMIN — AMLODIPINE BESYLATE 5 MG: 5 TABLET ORAL at 08:56

## 2025-04-17 RX ADMIN — ASPIRIN 81 MG: 81 TABLET, COATED ORAL at 10:11

## 2025-04-17 SDOH — HEALTH STABILITY: PHYSICAL HEALTH: ON AVERAGE, HOW MANY DAYS PER WEEK DO YOU ENGAGE IN MODERATE TO STRENUOUS EXERCISE (LIKE A BRISK WALK)?: 3 DAYS

## 2025-04-17 SDOH — SOCIAL STABILITY: SOCIAL INSECURITY: WITHIN THE LAST YEAR, HAVE YOU BEEN AFRAID OF YOUR PARTNER OR EX-PARTNER?: NO

## 2025-04-17 SDOH — HEALTH STABILITY: PHYSICAL HEALTH: ON AVERAGE, HOW MANY MINUTES DO YOU ENGAGE IN EXERCISE AT THIS LEVEL?: 20 MIN

## 2025-04-17 SDOH — ECONOMIC STABILITY: FOOD INSECURITY: WITHIN THE PAST 12 MONTHS, THE FOOD YOU BOUGHT JUST DIDN'T LAST AND YOU DIDN'T HAVE MONEY TO GET MORE.: NEVER TRUE

## 2025-04-17 SDOH — ECONOMIC STABILITY: INCOME INSECURITY: IN THE PAST 12 MONTHS HAS THE ELECTRIC, GAS, OIL, OR WATER COMPANY THREATENED TO SHUT OFF SERVICES IN YOUR HOME?: NO

## 2025-04-17 SDOH — HEALTH STABILITY: MENTAL HEALTH: HOW MANY DRINKS CONTAINING ALCOHOL DO YOU HAVE ON A TYPICAL DAY WHEN YOU ARE DRINKING?: PATIENT DOES NOT DRINK

## 2025-04-17 SDOH — SOCIAL STABILITY: SOCIAL INSECURITY: DO YOU FEEL UNSAFE GOING BACK TO THE PLACE WHERE YOU ARE LIVING?: NO

## 2025-04-17 SDOH — HEALTH STABILITY: MENTAL HEALTH: HOW OFTEN DO YOU HAVE SIX OR MORE DRINKS ON ONE OCCASION?: NEVER

## 2025-04-17 SDOH — ECONOMIC STABILITY: HOUSING INSECURITY: IN THE LAST 12 MONTHS, WAS THERE A TIME WHEN YOU WERE NOT ABLE TO PAY THE MORTGAGE OR RENT ON TIME?: NO

## 2025-04-17 SDOH — ECONOMIC STABILITY: FOOD INSECURITY: HOW HARD IS IT FOR YOU TO PAY FOR THE VERY BASICS LIKE FOOD, HOUSING, MEDICAL CARE, AND HEATING?: NOT HARD AT ALL

## 2025-04-17 SDOH — SOCIAL STABILITY: SOCIAL INSECURITY: HAS ANYONE EVER THREATENED TO HURT YOUR FAMILY OR YOUR PETS?: NO

## 2025-04-17 SDOH — SOCIAL STABILITY: SOCIAL INSECURITY: WITHIN THE LAST YEAR, HAVE YOU BEEN HUMILIATED OR EMOTIONALLY ABUSED IN OTHER WAYS BY YOUR PARTNER OR EX-PARTNER?: NO

## 2025-04-17 SDOH — HEALTH STABILITY: MENTAL HEALTH: HOW OFTEN DO YOU HAVE A DRINK CONTAINING ALCOHOL?: NEVER

## 2025-04-17 SDOH — ECONOMIC STABILITY: FOOD INSECURITY: WITHIN THE PAST 12 MONTHS, YOU WORRIED THAT YOUR FOOD WOULD RUN OUT BEFORE YOU GOT THE MONEY TO BUY MORE.: NEVER TRUE

## 2025-04-17 SDOH — ECONOMIC STABILITY: HOUSING INSECURITY: IN THE PAST 12 MONTHS, HOW MANY TIMES HAVE YOU MOVED WHERE YOU WERE LIVING?: 0

## 2025-04-17 SDOH — SOCIAL STABILITY: SOCIAL INSECURITY: ABUSE: ADULT

## 2025-04-17 SDOH — ECONOMIC STABILITY: TRANSPORTATION INSECURITY: IN THE PAST 12 MONTHS, HAS LACK OF TRANSPORTATION KEPT YOU FROM MEDICAL APPOINTMENTS OR FROM GETTING MEDICATIONS?: NO

## 2025-04-17 SDOH — SOCIAL STABILITY: SOCIAL INSECURITY: ARE THERE ANY APPARENT SIGNS OF INJURIES/BEHAVIORS THAT COULD BE RELATED TO ABUSE/NEGLECT?: NO

## 2025-04-17 SDOH — SOCIAL STABILITY: SOCIAL INSECURITY: ARE YOU OR HAVE YOU BEEN THREATENED OR ABUSED PHYSICALLY, EMOTIONALLY, OR SEXUALLY BY ANYONE?: NO

## 2025-04-17 SDOH — ECONOMIC STABILITY: HOUSING INSECURITY: AT ANY TIME IN THE PAST 12 MONTHS, WERE YOU HOMELESS OR LIVING IN A SHELTER (INCLUDING NOW)?: NO

## 2025-04-17 SDOH — SOCIAL STABILITY: SOCIAL INSECURITY: DO YOU FEEL ANYONE HAS EXPLOITED OR TAKEN ADVANTAGE OF YOU FINANCIALLY OR OF YOUR PERSONAL PROPERTY?: NO

## 2025-04-17 SDOH — SOCIAL STABILITY: SOCIAL INSECURITY: WERE YOU ABLE TO COMPLETE ALL THE BEHAVIORAL HEALTH SCREENINGS?: YES

## 2025-04-17 SDOH — SOCIAL STABILITY: SOCIAL INSECURITY: DOES ANYONE TRY TO KEEP YOU FROM HAVING/CONTACTING OTHER FRIENDS OR DOING THINGS OUTSIDE YOUR HOME?: NO

## 2025-04-17 SDOH — SOCIAL STABILITY: SOCIAL INSECURITY: HAVE YOU HAD ANY THOUGHTS OF HARMING ANYONE ELSE?: NO

## 2025-04-17 SDOH — SOCIAL STABILITY: SOCIAL INSECURITY: HAVE YOU HAD THOUGHTS OF HARMING ANYONE ELSE?: NO

## 2025-04-17 ASSESSMENT — COGNITIVE AND FUNCTIONAL STATUS - GENERAL
MOBILITY SCORE: 24
CLIMB 3 TO 5 STEPS WITH RAILING: A LITTLE
DAILY ACTIVITIY SCORE: 24
MOBILITY SCORE: 23
CLIMB 3 TO 5 STEPS WITH RAILING: A LITTLE
PATIENT BASELINE BEDBOUND: NO
MOBILITY SCORE: 23
DAILY ACTIVITIY SCORE: 24
DAILY ACTIVITIY SCORE: 24

## 2025-04-17 ASSESSMENT — LIFESTYLE VARIABLES
SKIP TO QUESTIONS 9-10: 1
SKIP TO QUESTIONS 9-10: 1
AUDIT-C TOTAL SCORE: 0
HOW MANY STANDARD DRINKS CONTAINING ALCOHOL DO YOU HAVE ON A TYPICAL DAY: PATIENT DOES NOT DRINK
AUDIT-C TOTAL SCORE: 0
HOW OFTEN DO YOU HAVE A DRINK CONTAINING ALCOHOL: NEVER
AUDIT-C TOTAL SCORE: 0
HOW OFTEN DO YOU HAVE 6 OR MORE DRINKS ON ONE OCCASION: NEVER

## 2025-04-17 ASSESSMENT — ACTIVITIES OF DAILY LIVING (ADL)
FEEDING YOURSELF: INDEPENDENT
DRESSING YOURSELF: INDEPENDENT
JUDGMENT_ADEQUATE_SAFELY_COMPLETE_DAILY_ACTIVITIES: YES
HEARING - RIGHT EAR: FUNCTIONAL
GROOMING: INDEPENDENT
TOILETING: INDEPENDENT
LACK_OF_TRANSPORTATION: NO
HEARING - LEFT EAR: FUNCTIONAL
PATIENT'S MEMORY ADEQUATE TO SAFELY COMPLETE DAILY ACTIVITIES?: YES
WALKS IN HOME: INDEPENDENT
BATHING: INDEPENDENT
ADEQUATE_TO_COMPLETE_ADL: YES
LACK_OF_TRANSPORTATION: NO

## 2025-04-17 ASSESSMENT — PAIN SCALES - GENERAL
PAINLEVEL_OUTOF10: 0 - NO PAIN

## 2025-04-17 ASSESSMENT — PATIENT HEALTH QUESTIONNAIRE - PHQ9
2. FEELING DOWN, DEPRESSED OR HOPELESS: NOT AT ALL
1. LITTLE INTEREST OR PLEASURE IN DOING THINGS: NOT AT ALL
SUM OF ALL RESPONSES TO PHQ9 QUESTIONS 1 & 2: 0

## 2025-04-17 ASSESSMENT — PAIN - FUNCTIONAL ASSESSMENT: PAIN_FUNCTIONAL_ASSESSMENT: 0-10

## 2025-04-17 NOTE — H&P
History Of Present Illness  Genet Wilks is a 75-year-old female who presents to the ED with shortness of breath.  Patient has a past medical history of asthma, COPD wears at bedtime O2 and PRN, MI, atrial valve replacement, A-fib on Coumadin, CAD, heart failure, DM, HLD, and hypothyroidism.  Patient reports on April 11/25 she began noticing increased shortness of breath.  On Saturday morning she had to start wearing her oxygen during the day which she only typically wears at nighttime, she later had to increase from 2 LNC to 3 LNC.  Patient did not notice much improvement and called her doctor to make an appointment to be seen but he was unable to get her in.  Her PCP ordered an antibiotic for her which she was taking at home.  Patient began to have diarrhea that was watery but nonbloody on Monday and Tuesday of this week.  Patient also had 1 episode of nonbloody emesis.  Patient denies any nausea since just a decreased appetite.  Patient reports she has been drinking fluids.  Patient denies any increase in swelling.  Patient denies any fevers at home.  Patient does report having a worsening moist cough with a thick sputum production.  Patient decided come to the ER today as the shortness of breath has been increasingly getting worse.  Patient denies any chest pain, syncopal episodes, urinary symptoms weakness, or new open skin sores.     ED Course      Hemodynamically Stable.    Vitals: Temp. 36.1, HR 79, Resp.  16, /62, Pulse ox 93% RA       Labs: Glucose 155, Na+ 136, K+ 3.5, Bun 19, Creat. 0.97, GFR 62, Ca 9.4, Albumin 4.3, Alk Phos. 71, ALT 11, AST 20, , troponin 24 then 19, Mg+ 1.86, WBC 4.8,  Hgb.  12.2, Hct.  40.3,  INR 1.7,  Medications: Albuterol, azithromycin, Lasix, DuoNeb, Solu-Medrol      Imaging: interpreted by radiologist.  Chest x-ray: Mild vascular congestion.  Faint hazy opacities in the inferior lung bases similar compared to prior.  EKG: Not available for my review.       Patient  was 93% RA in ED placed on nasal cannula in ED now 99%.     Past Medical History  Medical History[1]    Surgical History  Surgical History[2]     Social History  She reports that she has never smoked. She has never been exposed to tobacco smoke. She has never used smokeless tobacco. She reports that she does not currently use alcohol. She reports that she does not use drugs.  Patient currently lives at home with her son, daughter-in-law, and grandson.  Patient does not use any assistive devices for ambulation.    Family History  Family History[3]     Allergies  Penicillins, Prednisone, Mushroom, and Simvastatin    Review of Systems     10 point ROS systems completed and is negative except for what is stated in HPI.     Physical Exam  Constitutional:       General: She is awake. She is not in acute distress.     Appearance: Normal appearance. She is obese. She is ill-appearing. She is not toxic-appearing.      Interventions: Nasal cannula in place.   HENT:      Head: Normocephalic and atraumatic.      Nose: Nose normal. No rhinorrhea.      Mouth/Throat:      Mouth: Mucous membranes are dry.   Eyes:      General:         Right eye: No discharge.         Left eye: No discharge.      Conjunctiva/sclera: Conjunctivae normal.      Pupils: Pupils are equal, round, and reactive to light.   Cardiovascular:      Rate and Rhythm: Normal rate and regular rhythm.      Pulses: Normal pulses.      Comments: Heart valve clicking heard on auscultation  Pulmonary:      Effort: Pulmonary effort is normal. No respiratory distress.      Breath sounds: Examination of the right-upper field reveals rhonchi. Examination of the left-upper field reveals rhonchi. Examination of the right-middle field reveals rhonchi. Examination of the left-middle field reveals rhonchi. Rhonchi present. No wheezing.   Abdominal:      General: Bowel sounds are normal. There is no distension.      Palpations: Abdomen is soft.      Tenderness: There is no  "abdominal tenderness. There is no guarding.   Musculoskeletal:         General: Normal range of motion.      Cervical back: Normal range of motion and neck supple.   Skin:     General: Skin is warm and dry.   Neurological:      General: No focal deficit present.      Mental Status: She is alert and oriented to person, place, and time. Mental status is at baseline.      Motor: No weakness.   Psychiatric:         Attention and Perception: Attention normal.         Mood and Affect: Mood normal.         Behavior: Behavior normal. Behavior is cooperative.          Last Recorded Vitals  Blood pressure 177/75, pulse 89, temperature 36.3 °C (97.3 °F), temperature source Temporal, resp. rate 16, height 1.575 m (5' 2\"), weight 95.3 kg (210 lb), SpO2 98%.    Relevant Results  Results for orders placed or performed during the hospital encounter of 04/16/25 (from the past 24 hours)   Comprehensive metabolic panel   Result Value Ref Range    Glucose 155 (H) 74 - 99 mg/dL    Sodium 136 136 - 145 mmol/L    Potassium 3.5 3.5 - 5.3 mmol/L    Chloride 99 98 - 107 mmol/L    Bicarbonate 29 21 - 32 mmol/L    Anion Gap 12 10 - 20 mmol/L    Urea Nitrogen 19 6 - 23 mg/dL    Creatinine 0.97 0.50 - 1.05 mg/dL    eGFR 62 >60 mL/min/1.73m*2    Calcium 9.4 8.6 - 10.3 mg/dL    Albumin 4.3 3.4 - 5.0 g/dL    Alkaline Phosphatase 71 33 - 136 U/L    Total Protein 8.1 6.4 - 8.2 g/dL    AST 20 9 - 39 U/L    Bilirubin, Total 1.5 (H) 0.0 - 1.2 mg/dL    ALT 11 7 - 45 U/L   Magnesium   Result Value Ref Range    Magnesium 1.86 1.60 - 2.40 mg/dL   CBC and Auto Differential   Result Value Ref Range    WBC 4.8 4.4 - 11.3 x10*3/uL    nRBC 0.0 0.0 - 0.0 /100 WBCs    RBC 4.56 4.00 - 5.20 x10*6/uL    Hemoglobin 12.2 12.0 - 16.0 g/dL    Hematocrit 40.3 36.0 - 46.0 %    MCV 88 80 - 100 fL    MCH 26.8 26.0 - 34.0 pg    MCHC 30.3 (L) 32.0 - 36.0 g/dL    RDW 15.2 (H) 11.5 - 14.5 %    Platelets 242 150 - 450 x10*3/uL    Neutrophils % 58.8 40.0 - 80.0 %    Immature " Granulocytes %, Automated 0.4 0.0 - 0.9 %    Lymphocytes % 22.4 13.0 - 44.0 %    Monocytes % 14.9 2.0 - 10.0 %    Eosinophils % 2.7 0.0 - 6.0 %    Basophils % 0.8 0.0 - 2.0 %    Neutrophils Absolute 2.80 1.60 - 5.50 x10*3/uL    Immature Granulocytes Absolute, Automated 0.02 0.00 - 0.50 x10*3/uL    Lymphocytes Absolute 1.07 0.80 - 3.00 x10*3/uL    Monocytes Absolute 0.71 0.05 - 0.80 x10*3/uL    Eosinophils Absolute 0.13 0.00 - 0.40 x10*3/uL    Basophils Absolute 0.04 0.00 - 0.10 x10*3/uL   B-Type Natriuretic Peptide   Result Value Ref Range     (H) 0 - 99 pg/mL   Troponin I, High Sensitivity, Initial   Result Value Ref Range    Troponin I, High Sensitivity 24 (H) 0 - 13 ng/L   Protime-INR   Result Value Ref Range    Protime 18.6 (H) 9.8 - 12.4 seconds    INR 1.7 (H) 0.9 - 1.1   Troponin, High Sensitivity, 1 Hour   Result Value Ref Range    Troponin I, High Sensitivity 19 (H) 0 - 13 ng/L     XR chest 2 views  Result Date: 4/16/2025  STUDY: Chest Radiographs;  04/16/2025 at 12:02 hours. INDICATION: Shortness of breath. COMPARISON: XR Chest 01/06/2025 at 11:06 hours. ACCESSION NUMBER(S): [id number] ORDERING CLINICIAN: ALEX MOREL TECHNIQUE:  Frontal and lateral chest. FINDINGS: CARDIOMEDIASTINAL SILHOUETTE: Heart is borderline enlarged with evidence of prior median sternotomy.  Mild increased pulmonary vascularity  LUNGS: Faint hazy opacities in the inferior lung bases similar compared to prior.  ABDOMEN: No remarkable upper abdominal findings.  BONES: No acute osseous changes.    Mild vascular congestion. Signed by Alex Prado MD         Assessment & Plan  COPD (chronic obstructive pulmonary disease) (Multi)    CHF exacerbation (Multi)    Bilateral pneumonia    Diarrhea      Patient arrived to ED today after having symptoms of shortness of breath for 6 days.  Patient typically only wears oxygen at night for her COPD.  Patient has been wearing oxygen during the day for the past 5 days and has had to  increase it to 3 L nasal cannula from 2 L.  Patient called her doctor to get an appointment was able to get in but had an antibiotic ordered which she has been taking without improvement in her symptoms.  Chest x-ray today in ED revealed mild vascular congestion with bilateral faint hazy opacities in the inferior lung bases which is similar to prior chest x-ray in January in which patient had pneumonia.  Patient had mild elevated BNP today.  Pulmonology consult placed and cardiology consult placed, appreciate recs.  Pending patient's INR levels, other antibiotics may need to be considered due to Coumadin.    Patient admitted to Dr. Rod burgos for further medical management.      # Pneumonia  # COPD exacerbation  # Acute hypoxia  # Shortness of breath  # Diarrhea  -Antibiotics initiated in ED, will continue azithromycin (check daily INR)  -Procalcitonin ordered  -Flu/COVID/RSV ordered  -DuoNeb scheduled, albuterol as needed  -Mucinex twice daily  -Tessalon Perles  -Solu-Medrol every 8  -Strep pneumo/Legionella urine  -As needed oxygen >92%  -Cardiac diet   -admitted to observation with telemetry    - q4 vitals    -morning labs, CBC, BMP  - Chest x-ray revealed faint hazy opacities representing pneumonia which were similar on prior x-ray in January 2025.    # CHF exacerbation  # Vascular congestion  # Elevated BNP  # Elevated troponin  # Subtherapeutic INR  - Cardiology consult  - Fluid restriction  - Strict I's and O's  - Daily weight  - Last echo 1/6/2025, EF 55%, left atrium moderately dilated, AVR,  - Lasix given in ED 40 mg, will continue 20 mg twice daily  -PT/OT/SW   -Daily INR, trend trop, mg+       Chronic Conditions   # CAD, HLD  # MI, atrial valve replacement, A-fib, CAD  # DM  -Hold home glycemic medication   -SSI with hypoglycemic protocol   -POCT  # Hypothyroidism  # Asthma     Continue home medications as ordered   Full Code      #DVT Prophylaxis   Scd's as tolerated   DVT Prophylaxis   On eliquis      Thorough record review performed of previous labs and notes from prior encounters.            MARICRUZ Guo-NEIL         [1]   Past Medical History:  Diagnosis Date    Long term (current) use of anticoagulants     Long-term (current) use of anticoagulants    Old myocardial infarction     History of non-ST elevation myocardial infarction (NSTEMI)    Personal history of (corrected) congenital malformations of heart and circulatory system     History of congenital anomaly of heart    Personal history of other diseases of the circulatory system     History of rheumatic fever    Personal history of other diseases of the circulatory system     History of mitral valve prolapse    Personal history of other diseases of the circulatory system     History of atrial fibrillation    Personal history of other diseases of the musculoskeletal system and connective tissue     History of back pain    Personal history of other diseases of the nervous system and sense organs     History of carpal tunnel syndrome    Personal history of other diseases of the respiratory system     History of asthma    Personal history of other endocrine, nutritional and metabolic disease     History of morbid obesity    Personal history of other endocrine, nutritional and metabolic disease     History of hypothyroidism    Personal history of other endocrine, nutritional and metabolic disease     History of hypercholesterolemia    Personal history of other specified conditions     History of chest pain    Presence of other heart-valve replacement     Heart valve replaced by other means    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    Mechanical heart valve present   [2]   Past Surgical History:  Procedure Laterality Date    CARDIAC CATHETERIZATION  06/15/2018    Cardiac Cath Procedure Outcome:    CARDIAC SURGERY  11/13/2014    Heart Surgery    KNEE SURGERY  06/15/2018    Knee Surgery    OTHER SURGICAL HISTORY  11/13/2014    Surgery    OTHER  SURGICAL HISTORY  11/13/2014    Colposcopy Cervix With Biopsy(S)    TONSILLECTOMY  11/13/2014    Tonsillectomy    TUBAL LIGATION  11/13/2014    Tubal Ligation   [3]   Family History  Problem Relation Name Age of Onset    Diabetes Mother      Stroke Father      Hypertension Father      Diabetes Sister      Breast cancer Sister      Throat cancer Brother      Diabetes Brother      Other (S/P CABG x3) Brother      Cancer Other sibling

## 2025-04-17 NOTE — H&P
History Of Present Illness  Genet Wilks is a 75-year-old female who presents to the ED with shortness of breath.  Patient has a past medical history of asthma, COPD wears at bedtime O2 and PRN, MI, atrial valve replacement, A-fib on Coumadin, CAD, heart failure, DM, HLD, and hypothyroidism.  Patient reports on April 11/25 she began noticing increased shortness of breath.  On Saturday morning she had to start wearing her oxygen during the day which she only typically wears at nighttime, she later had to increase from 2 LNC to 3 LNC.  Patient did not notice much improvement and called her doctor to make an appointment to be seen but he was unable to get her in.  Her PCP ordered an antibiotic for her which she was taking at home.  Patient began to have diarrhea that was watery but nonbloody on Monday and Tuesday of this week.  Patient also had 1 episode of nonbloody emesis.  Patient denies any nausea since just a decreased appetite.  Patient reports she has been drinking fluids.  Patient denies any increase in swelling.  Patient denies any fevers at home.  Patient does report having a worsening moist cough with a thick sputum production.  Patient decided come to the ER today as the shortness of breath has been increasingly getting worse.  Patient denies any chest pain, syncopal episodes, urinary symptoms weakness, or new open skin sores.     ED Course      Hemodynamically Stable.    Vitals: Temp. 36.1, HR 79, Resp.  16, /62, Pulse ox 93% RA       Labs: Glucose 155, Na+ 136, K+ 3.5, Bun 19, Creat. 0.97, GFR 62, Ca 9.4, Albumin 4.3, Alk Phos. 71, ALT 11, AST 20, , troponin 24 then 19, Mg+ 1.86, WBC 4.8,  Hgb.  12.2, Hct.  40.3,  INR 1.7,  Medications: Albuterol, azithromycin, Lasix, DuoNeb, Solu-Medrol      Imaging: interpreted by radiologist.  Chest x-ray: Mild vascular congestion.  Faint hazy opacities in the inferior lung bases similar compared to prior.  EKG: Not available for my review.       Patient  was 93% RA in ED placed on nasal cannula in ED now 99%.     Past Medical History  Medical History[1]    Surgical History  Surgical History[2]     Social History  She reports that she has never smoked. She has never been exposed to tobacco smoke. She has never used smokeless tobacco. She reports that she does not currently use alcohol. She reports that she does not use drugs.  Patient currently lives at home with her son, daughter-in-law, and grandson.  Patient does not use any assistive devices for ambulation.    Family History  Family History[3]     Allergies  Penicillins, Prednisone, Mushroom, and Simvastatin    Review of Systems     10 point ROS systems completed and is negative except for what is stated in HPI.     Physical Exam  Constitutional:       General: She is awake. She is not in acute distress.     Appearance: Normal appearance. She is obese. She is ill-appearing. She is not toxic-appearing.      Interventions: Nasal cannula in place.   HENT:      Head: Normocephalic and atraumatic.      Nose: Nose normal. No rhinorrhea.      Mouth/Throat:      Mouth: Mucous membranes are dry.   Eyes:      General:         Right eye: No discharge.         Left eye: No discharge.      Conjunctiva/sclera: Conjunctivae normal.      Pupils: Pupils are equal, round, and reactive to light.   Cardiovascular:      Rate and Rhythm: Normal rate and regular rhythm.      Pulses: Normal pulses.      Comments: Heart valve clicking heard on auscultation  Pulmonary:      Effort: Pulmonary effort is normal. No respiratory distress.      Breath sounds: Examination of the right-upper field reveals rhonchi. Examination of the left-upper field reveals rhonchi. Examination of the right-middle field reveals rhonchi. Examination of the left-middle field reveals rhonchi. Rhonchi present. No wheezing.   Abdominal:      General: Bowel sounds are normal. There is no distension.      Palpations: Abdomen is soft.      Tenderness: There is no  "abdominal tenderness. There is no guarding.   Musculoskeletal:         General: Normal range of motion.      Cervical back: Normal range of motion and neck supple.   Skin:     General: Skin is warm and dry.   Neurological:      General: No focal deficit present.      Mental Status: She is alert and oriented to person, place, and time. Mental status is at baseline.      Motor: No weakness.   Psychiatric:         Attention and Perception: Attention normal.         Mood and Affect: Mood normal.         Behavior: Behavior normal. Behavior is cooperative.          Last Recorded Vitals  Blood pressure 130/60, pulse 81, temperature 36.1 °C (97 °F), temperature source Temporal, resp. rate 18, height 1.575 m (5' 2\"), weight 95.3 kg (210 lb), SpO2 98%.    Relevant Results  Results for orders placed or performed during the hospital encounter of 04/16/25 (from the past 24 hours)   Comprehensive metabolic panel   Result Value Ref Range    Glucose 155 (H) 74 - 99 mg/dL    Sodium 136 136 - 145 mmol/L    Potassium 3.5 3.5 - 5.3 mmol/L    Chloride 99 98 - 107 mmol/L    Bicarbonate 29 21 - 32 mmol/L    Anion Gap 12 10 - 20 mmol/L    Urea Nitrogen 19 6 - 23 mg/dL    Creatinine 0.97 0.50 - 1.05 mg/dL    eGFR 62 >60 mL/min/1.73m*2    Calcium 9.4 8.6 - 10.3 mg/dL    Albumin 4.3 3.4 - 5.0 g/dL    Alkaline Phosphatase 71 33 - 136 U/L    Total Protein 8.1 6.4 - 8.2 g/dL    AST 20 9 - 39 U/L    Bilirubin, Total 1.5 (H) 0.0 - 1.2 mg/dL    ALT 11 7 - 45 U/L   Magnesium   Result Value Ref Range    Magnesium 1.86 1.60 - 2.40 mg/dL   CBC and Auto Differential   Result Value Ref Range    WBC 4.8 4.4 - 11.3 x10*3/uL    nRBC 0.0 0.0 - 0.0 /100 WBCs    RBC 4.56 4.00 - 5.20 x10*6/uL    Hemoglobin 12.2 12.0 - 16.0 g/dL    Hematocrit 40.3 36.0 - 46.0 %    MCV 88 80 - 100 fL    MCH 26.8 26.0 - 34.0 pg    MCHC 30.3 (L) 32.0 - 36.0 g/dL    RDW 15.2 (H) 11.5 - 14.5 %    Platelets 242 150 - 450 x10*3/uL    Neutrophils % 58.8 40.0 - 80.0 %    Immature " Granulocytes %, Automated 0.4 0.0 - 0.9 %    Lymphocytes % 22.4 13.0 - 44.0 %    Monocytes % 14.9 2.0 - 10.0 %    Eosinophils % 2.7 0.0 - 6.0 %    Basophils % 0.8 0.0 - 2.0 %    Neutrophils Absolute 2.80 1.60 - 5.50 x10*3/uL    Immature Granulocytes Absolute, Automated 0.02 0.00 - 0.50 x10*3/uL    Lymphocytes Absolute 1.07 0.80 - 3.00 x10*3/uL    Monocytes Absolute 0.71 0.05 - 0.80 x10*3/uL    Eosinophils Absolute 0.13 0.00 - 0.40 x10*3/uL    Basophils Absolute 0.04 0.00 - 0.10 x10*3/uL   B-Type Natriuretic Peptide   Result Value Ref Range     (H) 0 - 99 pg/mL   Troponin I, High Sensitivity, Initial   Result Value Ref Range    Troponin I, High Sensitivity 24 (H) 0 - 13 ng/L   Protime-INR   Result Value Ref Range    Protime 18.6 (H) 9.8 - 12.4 seconds    INR 1.7 (H) 0.9 - 1.1   Troponin, High Sensitivity, 1 Hour   Result Value Ref Range    Troponin I, High Sensitivity 19 (H) 0 - 13 ng/L   Sars-CoV-2, Influenza A/B and RSV PCR   Result Value Ref Range    Coronavirus 2019, PCR Not Detected Not Detected    Flu A Result Not Detected Not Detected    Flu B Result Not Detected Not Detected    RSV PCR Not Detected Not Detected   POCT GLUCOSE   Result Value Ref Range    POCT Glucose 379 (H) 74 - 99 mg/dL   POCT GLUCOSE   Result Value Ref Range    POCT Glucose 324 (H) 74 - 99 mg/dL   POCT GLUCOSE   Result Value Ref Range    POCT Glucose 286 (H) 74 - 99 mg/dL   Magnesium   Result Value Ref Range    Magnesium 1.95 1.60 - 2.40 mg/dL   Coagulation Screen   Result Value Ref Range    Protime 19.9 (H) 9.8 - 12.4 seconds    INR 1.8 (H) 0.9 - 1.1    aPTT 27 26 - 36 seconds   Comprehensive metabolic panel   Result Value Ref Range    Glucose 290 (H) 74 - 99 mg/dL    Sodium 135 (L) 136 - 145 mmol/L    Potassium 3.8 3.5 - 5.3 mmol/L    Chloride 100 98 - 107 mmol/L    Bicarbonate 27 21 - 32 mmol/L    Anion Gap 12 10 - 20 mmol/L    Urea Nitrogen 28 (H) 6 - 23 mg/dL    Creatinine 0.94 0.50 - 1.05 mg/dL    eGFR 64 >60 mL/min/1.73m*2     Calcium 9.1 8.6 - 10.3 mg/dL    Albumin 3.9 3.4 - 5.0 g/dL    Alkaline Phosphatase 63 33 - 136 U/L    Total Protein 7.5 6.4 - 8.2 g/dL    AST 16 9 - 39 U/L    Bilirubin, Total 1.0 0.0 - 1.2 mg/dL    ALT 10 7 - 45 U/L   Lavender Top   Result Value Ref Range    Extra Tube Hold for add-ons.      *Note: Due to a large number of results and/or encounters for the requested time period, some results have not been displayed. A complete set of results can be found in Results Review.     XR chest 2 views  Result Date: 4/16/2025  STUDY: Chest Radiographs;  04/16/2025 at 12:02 hours. INDICATION: Shortness of breath. COMPARISON: XR Chest 01/06/2025 at 11:06 hours. ACCESSION NUMBER(S): [id number] ORDERING CLINICIAN: ALEX MOREL TECHNIQUE:  Frontal and lateral chest. FINDINGS: CARDIOMEDIASTINAL SILHOUETTE: Heart is borderline enlarged with evidence of prior median sternotomy.  Mild increased pulmonary vascularity  LUNGS: Faint hazy opacities in the inferior lung bases similar compared to prior.  ABDOMEN: No remarkable upper abdominal findings.  BONES: No acute osseous changes.    Mild vascular congestion. Signed by Alex Prado MD         Assessment & Plan  COPD (chronic obstructive pulmonary disease) (Multi)    CHF exacerbation (Multi)    Bilateral pneumonia    Diarrhea    COPD exacerbation (Multi)      Patient arrived to ED today after having symptoms of shortness of breath for 6 days.  Patient typically only wears oxygen at night for her COPD.  Patient has been wearing oxygen during the day for the past 5 days and has had to increase it to 3 L nasal cannula from 2 L.  Patient called her doctor to get an appointment was able to get in but had an antibiotic ordered which she has been taking without improvement in her symptoms.  Chest x-ray today in ED revealed mild vascular congestion with bilateral faint hazy opacities in the inferior lung bases which is similar to prior chest x-ray in January in which patient had  pneumonia.  Patient had mild elevated BNP today.  Pulmonology consult placed and cardiology consult placed, appreciate recs.  Pending patient's INR levels, other antibiotics may need to be considered due to Coumadin.    Patient admitted to Dr. Rod burgos for further medical management.      # Pneumonia  # COPD exacerbation  # Acute hypoxia  # Shortness of breath  # Diarrhea  -Antibiotics initiated in ED, will continue azithromycin (check daily INR)  -Procalcitonin ordered  -Flu/COVID/RSV ordered  -DuoNeb scheduled, albuterol as needed  -Mucinex twice daily  -Tessalon Perles  -Solu-Medrol every 8  -Strep pneumo/Legionella urine  -As needed oxygen >92%  -Cardiac diet   -admitted to observation with telemetry    - q4 vitals    -morning labs, CBC, BMP  - Chest x-ray revealed faint hazy opacities representing pneumonia which were similar on prior x-ray in January 2025.    # CHF exacerbation  # Vascular congestion  # Elevated BNP  # Elevated troponin  # Subtherapeutic INR  - Cardiology consult  - Fluid restriction  - Strict I's and O's  - Daily weight  - Last echo 1/6/2025, EF 55%, left atrium moderately dilated, AVR,  - Lasix given in ED 40 mg, will continue 20 mg twice daily  -PT/OT/SW   -Daily INR, trend trop, mg+       Chronic Conditions   # CAD, HLD  # MI, atrial valve replacement, A-fib, CAD  # DM  -Hold home glycemic medication   -SSI with hypoglycemic protocol   -POCT  # Hypothyroidism  # Asthma     Continue home medications as ordered   Full Code      #DVT Prophylaxis   Scd's as tolerated   DVT Prophylaxis   On eliquis     Thorough record review performed of previous labs and notes from prior encounters.      4/17: breathing has improved, still wheezy, continue treatments, suspect dc in next 24-48 hrs      Rod Burgos,          [1]   Past Medical History:  Diagnosis Date    CHF (congestive heart failure)     Long term (current) use of anticoagulants     Long-term (current) use of anticoagulants    Old myocardial  infarction     History of non-ST elevation myocardial infarction (NSTEMI)    Personal history of (corrected) congenital malformations of heart and circulatory system     History of congenital anomaly of heart    Personal history of other diseases of the circulatory system     History of rheumatic fever    Personal history of other diseases of the circulatory system     History of mitral valve prolapse    Personal history of other diseases of the circulatory system     History of atrial fibrillation    Personal history of other diseases of the musculoskeletal system and connective tissue     History of back pain    Personal history of other diseases of the nervous system and sense organs     History of carpal tunnel syndrome    Personal history of other diseases of the respiratory system     History of asthma    Personal history of other endocrine, nutritional and metabolic disease     History of morbid obesity    Personal history of other endocrine, nutritional and metabolic disease     History of hypothyroidism    Personal history of other endocrine, nutritional and metabolic disease     History of hypercholesterolemia    Personal history of other specified conditions     History of chest pain    Presence of other heart-valve replacement     Heart valve replaced by other means    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    Mechanical heart valve present   [2]   Past Surgical History:  Procedure Laterality Date    CARDIAC CATHETERIZATION  06/15/2018    Cardiac Cath Procedure Outcome:    CARDIAC SURGERY  11/13/2014    Heart Surgery    KNEE SURGERY  06/15/2018    Knee Surgery    OTHER SURGICAL HISTORY  11/13/2014    Surgery    OTHER SURGICAL HISTORY  11/13/2014    Colposcopy Cervix With Biopsy(S)    TONSILLECTOMY  11/13/2014    Tonsillectomy    TUBAL LIGATION  11/13/2014    Tubal Ligation   [3]   Family History  Problem Relation Name Age of Onset    Diabetes Mother      Stroke Father      Hypertension Father       Diabetes Sister      Breast cancer Sister      Throat cancer Brother      Diabetes Brother      Other (S/P CABG x3) Brother      Cancer Other sibling

## 2025-04-17 NOTE — PROGRESS NOTES
Physical Therapy    Physical Therapy Evaluation    Patient Name: Genet Wilks  MRN: [id number]  Today's Date: 4/17/2025   Time Calculation  Start Time: 1016  Stop Time: 1027  Time Calculation (min): 11 min  328/328-A    Assessment/Plan   PT Assessment  PT Assessment Results: Decreased strength, Decreased endurance, Impaired balance, Decreased mobility  Rehab Prognosis: Good  Barriers to Discharge Home: No anticipated barriers  End of Session Communication: Bedside nurse  Assessment Comment: Pt demonstrates complete independence with all mobility, amb, and ADLs. Pt does not require further PT services and will be discharged from caseload.  End of Session Patient Position: Bed, 2 rail up, Alarm off, not on at start of session (all needs in reach and no complaints noted)  IP OR SWING BED PT PLAN  Inpatient or Swing Bed: Inpatient  PT Plan  PT Plan: PT Eval only  PT Eval Only Reason: At baseline function  PT Frequency: PT eval only  PT Discharge Recommendations: No PT needed after discharge  PT Recommended Transfer Status: Independent  PT - OK to Discharge: Yes    Subjective     Current Problem:  1. COPD exacerbation (Multi)        2. Subtherapeutic anticoagulation          Problem List[1]    General Visit Information:  General  Reason for Referral: PT Eval and Treat  Referred By: MARICRUZ Guo-CNP  Past Medical History Relevant to Rehab: 75-year-old female who presents to the ED with shortness of breath.  Patient has a past medical history of asthma, COPD wears at bedtime O2 and PRN, MI, atrial valve replacement, A-fib on Coumadin, CAD, heart failure, DM, HLD, and hypothyroidism.  Patient reports on April 11/25 she began noticing increased shortness of breath.  On Saturday morning she had to start wearing her oxygen during the day which she only typically wears at nighttime, she later had to increase from 2 LNC to 3 LNC.  Family/Caregiver Present: No  Co-Treatment: OT  Co-Treatment Reason: maximize safety and  functional mobility  Prior to Session Communication: Bedside nurse  Patient Position Received: Bed, 2 rail up, Alarm off, not on at start of session  General Comment: Pt agreeable to PT.    Home Living:  Home Living  Type of Home: House  Lives With:  (son, daughter in law, and 2 grandchildren)  Home Adaptive Equipment: None  Home Layout: Able to live on main level with bedroom/bathroom  Home Access: Stairs to enter with rails (3)  Bathroom Shower/Tub: Tub/shower unit (with grab bar)  Bathroom Toilet: Standard  Home Living Comments: std. bed    Prior Level of Function:  Prior Function Per Pt/Caregiver Report  Level of [place]: Independent with ADLs and functional transfers, Independent with homemaking with ambulation (Pt amb without an AD, shares IADLs with family, (+) drive)    Precautions:  Precautions  Medical Precautions: Oxygen therapy device and L/min (2LPM)    Vital Signs:  Vital Signs  Vital Signs Comment: VSS    Objective     Pain:  Pain Assessment  Pain Assessment:  (0/10)    Cognition:  Cognition  Overall Cognitive Status: Within Functional Limits    General Assessments:  Sensation  Light Touch: No apparent deficits  Strength  Strength Comments: B LE ROM WFL, B LE strength WFL  Static Sitting Balance  Static Sitting-Level of Assistance: Independent  Dynamic Sitting Balance  Dynamic Sitting-Level of Assistance: Independent  Static Standing Balance  Static Standing-Level of Assistance: Independent  Dynamic Standing Balance  Dynamic Standing-Level of Assistance: Independent    Functional Assessments:  Bed Mobility  Bed Mobility: Yes  Bed Mobility 1  Bed Mobility Comments 1: supine <> sitting: independent  Transfers  Transfer: Yes  Transfer 1  Trials/Comments 1: STS from EOB: independent  Ambulation/Gait Training  Ambulation/Gait Training Performed: Yes  Ambulation/Gait Training 1  Comments/Distance (ft) 1: Pt was able to amb independently within her room 25' x 2 without an AD demonstrating good balance  and safety awareness.     Outcome Measures:  [facility] Basic Mobility  Turning from your back to your side while in a flat bed without using bedrails: None  Moving from lying on your back to sitting on the side of a flat bed without using bedrails: None  Moving to and from bed to chair (including a wheelchair): None  Standing up from a chair using your arms (e.g. wheelchair or bedside chair): None  To walk in hospital room: None  Climbing 3-5 steps with railing: None  Basic Mobility - Total Score: 24     Education Documentation  No documentation found.  Education Comments  No comments found.               [1]   Patient Active Problem List  Diagnosis    Encounter for screening mammogram for malignant neoplasm of breast    Mixed hyperlipidemia    Primary hypertension    Vitamin D deficiency    Hospital discharge follow-up    Mucopurulent chronic bronchitis (Multi)    CHF exacerbation (Multi)    Ankle edema, bilateral    Abnormal EKG    Abnormal mammogram of right breast    Accidental fall    Blurry vision    Contusion of right forearm    COPD (chronic obstructive pulmonary disease) (Multi)    ROGERS (dyspnea on exertion)    Hypothyroidism    Lumbosacral disc disease    Mechanical heart valve present    Oxygen dependent    Palpitations    Sciatica of right side    Asthmatic bronchitis (HHS-HCC)    Class 2 obesity with body mass index (BMI) of 38.0 to 38.9 in adult    Bereavement    High risk medication use    Iron deficiency anemia secondary to inadequate dietary iron intake    Primary osteoarthritis of both knees    Paroxysmal atrial fibrillation (Multi)    Herpes zoster    History of non-ST elevation myocardial infarction (NSTEMI)    History of rheumatic fever    Personal history of COVID-19    Acute on chronic diastolic (congestive) heart failure    Anticoagulated on Coumadin    Malaise and fatigue    Influenza A    Nonrheumatic aortic valve stenosis    Need for antibiotic prophylaxis for dental procedure    Diabetes 1.5,  managed as type 2 (Multi)    Bilateral pneumonia    Diarrhea    COPD exacerbation (Multi)

## 2025-04-17 NOTE — CONSULTS
Consults  History Of Present Illness:    Genet Wilks is a 75 y.o. female past with history notable for COPD on 2 L of oxygen as needed, CAD/MI, history of mechanical mitral valve valve replacement, HFpEF, paroxysmal atrial fibrillation on Coumadin, type 2 diabetes, hyperlipidemia, hypothyroidism admitted to the hospital secondary to increased shortness of breath.  Patient typically wears oxygen only at night as needed but has been wearing it throughout the day due to increased shortness of breath.  Has resolved her symptoms to contact her PCP and was sent antibiotic therapy.  Unfortunately, patient had an episode of diarrhea that was nonbloody Monday and then on Tuesday followed by episode of nonbloody emesis.    In the ED initial vital signs were for heart rate of 79 blood pressure 138/62 saturation is 93%.  Initial labs notable for BNP of 146 with troponin of 19 and 24.  INR 1.7.  CBC is unremarkable.  COVID flu RSV negative.  Chest x-ray notable for mild vascular congestion.  Patient has been to the hospital for further evaluation.  Of note patient a previous echocardiogram 1/6/2025 at which time the LVEF was greater than 55% and well-seated mechanical mitral valve prosthesis with a mean gradient of 7 mmHg moderate aortic valve regurgitation mild aortic valve stenosis.       Last Recorded Vitals:  Vitals:    04/17/25 0234 04/17/25 0625 04/17/25 0731 04/17/25 0854   BP:  128/60  130/60   BP Location:    Right arm   Patient Position:    Lying   Pulse:  73  81   Resp:       Temp:  35.1 °C (95.2 °F)  36.1 °C (97 °F)   TempSrc:    Temporal   SpO2: 99% 99% 96% 98%   Weight:       Height:           Last Labs:  CBC - 4/16/2025: 12:12 PM  4.8 12.2 242    40.3      CMP - 4/17/2025:  7:22 AM  9.1 7.5 16 --- 1.0   _ 3.9 10 63      PTT - 4/17/2025:  7:22 AM  1.8   19.9 27     Troponin I, High Sensitivity   Date/Time Value Ref Range Status   04/16/2025 01:20 PM 19 (H) 0 - 13 ng/L Final   04/16/2025 12:12 PM 24 (H) 0 - 13  ng/L Final   12/31/2024 01:36 PM 27 (H) 0 - 13 ng/L Final     BNP   Date/Time Value Ref Range Status   04/16/2025 12:12  (H) 0 - 99 pg/mL Final   12/31/2024 11:41  (H) 0 - 99 pg/mL Final     TR HGBA1C (Data Conversion)   Date/Time Value Ref Range Status   02/02/2023 02:04 PM 6.2 4.2 - 6.5 % Final   05/04/2022 03:50 PM 6.1 4.2 - 6.5 % Final     POCT Hemoglobin A1C   Date/Time Value Ref Range Status   02/03/2025 03:45 PM 8.5 (H) 4.2 - 6.5 % Final   09/09/2024 04:20 PM 7.6 (H) 4.2 - 6.5 % Final     LDL Calculated   Date/Time Value Ref Range Status   09/09/2024 04:30 PM 98 <=99 mg/dL Final     Comment:                                 Near   Borderline      AGE      Desirable  Optimal    High     High     Very High     0-19 Y     0 - 109     ---    110-129   >/= 130     ----    20-24 Y     0 - 119     ---    120-159   >/= 160     ----      >24 Y     0 -  99   100-129  130-159   160-189     >/=190     11/09/2023 03:26 PM 85 <=99 mg/dL Final     Comment:                                 Near   Borderline      AGE      Desirable  Optimal    High     High     Very High     0-19 Y     0 - 109     ---    110-129   >/= 130     ----    20-24 Y     0 - 119     ---    120-159   >/= 160     ----      >24 Y     0 -  99   100-129  130-159   160-189     >/=190       VLDL   Date/Time Value Ref Range Status   09/09/2024 04:30 PM 28 0 - 40 mg/dL Final   11/09/2023 03:26 PM 30 0 - 40 mg/dL Final   05/11/2023 01:57 PM 31 0 - 40 mg/dL Final   02/02/2023 07:44 AM 28 0 - 40 mg/dL Final   05/04/2022 12:00 AM 22 0 - 40 mg/dL Final      Last I/O:  No intake/output data recorded.    Past Cardiology Tests (Last 3 Years):  EKG:  ECG 12 Lead 01/23/2025      Electrocardiogram, 12-lead PRN ACS symptoms 01/03/2025      ECG 12 lead 12/31/2024      ECG 12 lead (Ancillary Performed) 12/17/2024      ECG 12 Lead 09/05/2024      ECG 12 Lead 03/20/2024      ECG 12 Lead 12/11/2023    Echo:  Transthoracic Echo (TTE) Complete  01/06/2025      Transthoracic Echo (TTE) Complete 03/20/2024    Ejection Fractions:  EF   Date/Time Value Ref Range Status   01/06/2025 09:45 AM 55 %    03/20/2024 02:02 PM 63 %      Cath:  No results found for this or any previous visit from the past 1095 days.    Stress Test:  No results found for this or any previous visit from the past 1095 days.    Cardiac Imaging:  No results found for this or any previous visit from the past 1095 days.      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, Personal history of other diseases of the circulatory system, Personal history of other diseases of the circulatory system, Personal history of other diseases of the circulatory system, Personal history of other diseases of the musculoskeletal system and connective tissue, Personal history of other diseases of the nervous system and sense organs, Personal history of other diseases of the respiratory system, Personal history of other endocrine, nutritional and metabolic disease, Personal history of other endocrine, nutritional and metabolic disease, Personal history of other endocrine, nutritional and metabolic disease, Personal history of other specified conditions, Presence of other heart-valve replacement, and Presence of prosthetic heart valve (06/02/2022).    Past Surgical History:  She has a past surgical history that includes Other surgical history (11/13/2014); Cardiac surgery (11/13/2014); Tubal ligation (11/13/2014); Other surgical history (11/13/2014); Tonsillectomy (11/13/2014); Knee surgery (06/15/2018); and Cardiac catheterization (06/15/2018).      Social History:  She reports that she has never smoked. She has never been exposed to tobacco smoke. She has never used smokeless tobacco. She reports that she does not  currently use alcohol. She reports that she does not use drugs.    Family History:  Family History[1]     Allergies:  Penicillins, Prednisone, Mushroom, and Simvastatin    Inpatient Medications:  Scheduled Medications[2]  PRN Medications[3]  Continuous Medications[4]  Outpatient Medications:  Current Outpatient Medications   Medication Instructions    albuterol 90 mcg/actuation inhaler 2 puffs, inhalation, Every 6 hours PRN    albuterol 0.63 mg, nebulization, 3 times daily RT    amLODIPine (NORVASC) 5 mg, oral, Daily    aspirin 81 mg, oral, Daily before lunch    atorvastatin (LIPITOR) 20 mg, oral, Nightly    benzonatate (TESSALON) 100 mg, oral, 3 times daily PRN    blood sugar diagnostic (OneTouch Ultra Test) strip USE 1 STRIP TO CHECK GLUCOSE ONCE DAILY    cholecalciferol (VITAMIN D-3) 2,000 Units, oral, Daily    doxycycline (VIBRAMYCIN) 100 mg, oral, 2 times daily, Take with at least 8 ounces (large glass) of water, do not lie down for 30 minutes after    enoxaparin (LOVENOX) 90 mg, subcutaneous, Every 12 hours, As directed per coumadin clinic    ferrous sulfate (IRON) 325 mg, Daily with breakfast    furosemide (LASIX) 40 mg, oral, Daily PRN    glimepiride (Amaryl) 2 mg tablet Increase to BID for elevated blood sugar    lancets (OneTouch Delica Plus Lancet) 33 gauge misc USE 1 TO CHECK GLUCOSE ONCE DAILY    levothyroxine (SYNTHROID, LEVOXYL) 75 mcg, oral, Daily, as directed    metoprolol succinate XL (TOPROL-XL) 25 mg, oral, Daily    oxygen (O2) 3 L/min, inhalation, Daily PRN    warfarin (Coumadin) 5 mg tablet Take 5 mg (1 tablet) every Monday and 2.5 mg (1/2 tablet) all other days or as directed by the anticoagulation clinic.       Physical Exam:  General: No acute distress,  A&O x3, supplemental oxygen  Skin: Warm and dry  Neck: JVD is not elevated  ENT: Moist mucous membranes no lesions appreciated  Pulmonary: Rhonchi in anterior lung fields  Cards: Regular rate rhythm, no murmurs gallops or rubs appreciated  normal S1-S2  Abdomen: Soft nontender nondistended  Extremities: No edema or cyanosis  Psych: Appropriate mood and affect        Assessment/Plan     1.  Acute on chronic respiratory failure: Appears provoked in setting of upper respiratory infection.  Combination of COPD exacerbation with heart failure exacerbation.  Improving with treatment.  - Suspect patient needs additional day of IV diuresis prior to transition to oral meds  Continue antibiotics and steroids which is managed by primary team-    2.  History of mitral valve disease status post mechanical mitral valve prosthesis subtherapeutic INR.  Goal INR is between 2.5-3.5 given the presence of prosthetic mitral valve.  - Continue aspirin 81 mg daily    3.  History of CAD/MI: No chest pain.    4.  History of atrial fibrillation: Appears permanent as patient is atrial fibrillation today.  She is on warfarin for CVA prophylaxis.    (This note was generated with voice recognition software and may contain errors including spelling, grammar, syntax and missed recognition of what was dictated, of which may not have been fully corrected)     Code Status:  Full Code    Mich Plummer MD PhD         [1]   Family History  Problem Relation Name Age of Onset    Diabetes Mother      Stroke Father      Hypertension Father      Diabetes Sister      Breast cancer Sister      Throat cancer Brother      Diabetes Brother      Other (S/P CABG x3) Brother      Cancer Other sibling    [2]   Scheduled medications   Medication Dose Route Frequency    amLODIPine  5 mg oral Daily    aspirin  81 mg oral Daily before lunch    atorvastatin  20 mg oral Nightly    azithromycin  500 mg intravenous q24h    cholecalciferol  50 mcg oral Daily    ferrous sulfate  65 mg of iron oral Daily with breakfast    furosemide  20 mg intravenous BID    insulin lispro  0-10 Units subcutaneous Before meals & nightly    ipratropium-albuteroL  3 mL nebulization q8h    levothyroxine  75 mcg oral Daily  before breakfast    methylPREDNISolone sodium succinate (PF)  40 mg intravenous q8h KIMBER    metoprolol succinate XL  25 mg oral Daily    warfarin  2.5 mg oral Once per day on Sunday Tuesday Wednesday Thursday Friday Saturday    [START ON 4/21/2025] warfarin  5 mg oral Every Monday   [3]   PRN medications   Medication    acetaminophen    albuterol    benzonatate    dextrose    dextrose    glucagon    glucagon    melatonin    ondansetron    oxygen   [4]   Continuous Medications   Medication Dose Last Rate

## 2025-04-17 NOTE — CARE PLAN
The patient's goals for the shift include  sleep, comfort and easier breathing    The clinical goals for the shift include Patient will report decrease SOB

## 2025-04-17 NOTE — PROGRESS NOTES
Spiritual Care Visit   Notes:  Ms. Wilks welcomed a visit. This was a patient-centered visit. She used the agency of her voice to share her narrative. She talked about her deep baron in God and how it sustains her. She talked about her family of origin, her childhood and marriage. She is a . She directed this visit. She has 2 sons and a large blended family. I created time and space for active, empathic listening. I intervened with emotional, spiritual care and maintained a non-anxious, nonjudgmental presence. This  offered opportunity for a holistic approach to wellness as whole-person care and allowed for integrative healing of the body, mind and spirit. There are no other needs. She expressed gratitude for listening.  Spiritual Care Request    Reason for Visit:  Routine Visit: Introduction  Continue Visiting: No   Request Received From:  Referral From:   Focus of Care:  Visited With: Patient   Refer to :  Spiritual Care Assessment    Spiritual Assessment:  Patient Spiritual Care Encounters  Suffering Severity: None  Fear Level: None  Feelings of Loneliness: Excellent  Feelings of Hopelessness: Excellent  Coping: Consistently demonstrated  Social Interaction: 100% of the time  Care Provided:  Intended Effects: Convey a calming presence, Establish rapport and connectedness  Methods: Encourage story-telling  Interventions: Provide access to a quiet place, Ask guided questions about baron  Sense of Community and or Restorationist Affiliation:  Synagogue    Addressed Needs/Concerns and/or [place]ar Through:  Restorationist Encounters  Restorationist Needs: Prayer  Outcome:  Outcome of Spiritual Care Visit: Trust in self/others/God   Advance Directives:  Spiritual Care Annotation    Annotation:    Patient: Genet Wilks    Date: 4/17/2025  Time: 2:46 PM  Total time (min.): 50    I offered instruction on how to reach out to the Spiritual Care Department for future needs. I remain available  upon request.  [facility] Department of Spiritual Care Contact #: [phone]  Signed by: Rev. Vera Enriquez ThM, MA

## 2025-04-17 NOTE — CARE PLAN
The patient's goals for the shift include  rest and comfort.    The clinical goals for the shift include improvement in breathing

## 2025-04-17 NOTE — CONSULTS
Pulmonary Critical Care note    Patient Name :Genet Wilks  Date of service : 04/17/25  MRN: [id number]  YOB: 1950      Interval History:    History of Present Illness:      75 y.o. female  on day  0 of admission presenting with COPD (chronic obstructive pulmonary disease) (Multi).  Genet Wilks is a 75-year-old female who presents to the ED with shortness of breath. Patient has a past medical history of asthma, COPD wears at bedtime O2 and PRN, MI, atrial valve replacement, A-fib on Coumadin, CAD, heart failure, DM, HLD, and hypothyroidism. Patient reports on April 11/25 she began noticing increased shortness of breath. On Saturday morning she had to start wearing her oxygen during the day which she only typically wears at nighttime, she later had to increase from 2 LNC to 3 LNC. Patient did not notice much improvement and called her doctor to make an appointment to be seen but he was unable to get her in. Her PCP ordered an antibiotic for her which she was taking at home. Patient began to have diarrhea that was watery but nonbloody on Monday and Tuesday of this week. Patient also had 1 episode of nonbloody emesis. Patient denies any nausea since just a decreased appetite. Patient reports she has been drinking fluids. Patient denies any increase in swelling. Patient denies any fevers at home. Patient does report having a worsening moist cough with a thick sputum production. Patient decided come to the ER today as the shortness of breath has been increasingly getting worse. Patient denies any chest pain, syncopal episodes, urinary symptoms weakness, or new open skin sores.   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, Personal history of other diseases of the circulatory system, Personal  history of other diseases of the circulatory system, Personal history of other diseases of the circulatory system, Personal history of other diseases of the musculoskeletal system and connective tissue, Personal history of other diseases of the nervous system and sense organs, Personal history of other diseases of the respiratory system, Personal history of other endocrine, nutritional and metabolic disease, Personal history of other endocrine, nutritional and metabolic disease, Personal history of other endocrine, nutritional and metabolic disease, Personal history of other specified conditions, Presence of other heart-valve replacement, and Presence of prosthetic heart valve (06/02/2022).   has a past surgical history that includes Other surgical history (11/13/2014); Cardiac surgery (11/13/2014); Tubal ligation (11/13/2014); Other surgical history (11/13/2014); Tonsillectomy (11/13/2014); Knee surgery (06/15/2018); and Cardiac catheterization (06/15/2018).   reports that she has never smoked. She has never been exposed to tobacco smoke. She has never used smokeless tobacco. She reports that she does not currently use alcohol. She reports that she does not use drugs.  Family History:   Family medical history includes stroke in father.    Allergies:     Penicillins, Prednisone, Mushroom, and Simvastatin      Social history:   reports that she has never smoked. She has never been exposed to tobacco smoke. She has never used smokeless tobacco. She reports that she does not currently use alcohol. She reports that she does not use drugs.      Review of Systems:   General: denies weight gain, denies loss of appetite, fever, chills, night sweats.  HEENT: denies headaches, dizziness, head trauma, visual changes, eye pain, tinnitus, nosebleeds, hoarseness or throat pain    Respiratory: denies chest pain, dyspnea, cough and hemoptysis  Cardiovascular: denies orthopnea, paroxysmal nocturnal dyspnea, leg swelling, and  previous heart attack.    Gastrointestinal: denies pain, nausea vomiting, diarrhea, constipation, melena or bleeding.  Genitourinary: denies hematuria, frequency, urgency or dysuria  Neurology: denies syncope, seizures, paralysis, paraesthesia   Endocrine: denies polyuria, polydipsia, skin or hair changes, and heat or cold intolerance  Musculoskeletal: denies joint pain, swelling, arthritis or myalgia  Hematologic: denies bleeding, adenopathy and easy bruising  Skin: denies rashes and skin discoloration  Psychiatry: denies depression    Physical Exam:   Vital Signs:   Vitals:    04/17/25 0854   BP: 130/60   Pulse: 81   Resp:    Temp: 36.1 °C (97 °F)   SpO2: 98%     Vitals:    04/16/25 1107   Weight: 95.3 kg (210 lb)         Input/Output:  No intake or output data in the 24 hours ending 04/17/25 1133    Oxygen requirements:    Ventilator Information:  FiO2 (%):  [28 %] 28 %  Oxygen Therapy/Pulse Ox  Medical Gas Therapy: Supplemental oxygen  Medical Gas Delivery Method: Nasal cannula (2L baseline hs)  FiO2 (%): 28 %  SpO2: 98 %  Patient Activity During SpO2 Measurement: At rest  Temp: 36.1 °C (97 °F)        General appearance: Not in pain or distress, in no respiratory distress    HEENT: Atraumatic/normocephalic, EOMI, YVETTE, pharynx clear, moist mucosa, redness of the uvula appreciated,   Neck: Supple, no jugular venous distension, lymphadenopathy, thyromegaly or carotid bruits  Chest: Diffuse wheezing or rhonchi hopefully with more mobility agree with the current management of IV Solu-Medrol  P.o. azithromycin  Monitor diarrhea, syndrome is most consistent with viral infection  Cardiovascular: Normal S1, S2, regular rate and rhythm, no murmur, rub or gallop  Abdomen: Normal sounds present, soft, lax with no tenderness, no hepatosplenomegaly, and no masses  Extremities: No edema. Pulses are equally present.   Skin: intact, no rashes   Neurologic: Alert and oriented x 3, No focal deficit         Current Medications:    Scheduled medications  Scheduled Medications[1]  Continuous medications  Continuous Medications[2]  PRN medications  PRN Medications[3]      Investigations:  Labs, radiological imaging and cardiac work up were personally reviewed    Results from last 7 days   Lab Units 04/17/25  0722 04/16/25  1212   SODIUM mmol/L 135* 136   POTASSIUM mmol/L 3.8 3.5   CHLORIDE mmol/L 100 99   CO2 mmol/L 27 29   BUN mg/dL 28* 19   CREATININE mg/dL 0.94 0.97   GLUCOSE mg/dL 290* 155*   CALCIUM mg/dL 9.1 9.4     Results from last 7 days   Lab Units 04/16/25  1212   WBC AUTO x10*3/uL 4.8   HEMOGLOBIN g/dL 12.2   HEMATOCRIT % 40.3   PLATELETS AUTO x10*3/uL 242         Imaging  XR chest 2 views  Result Date: 4/16/2025  Mild vascular congestion. Signed by Alex rPado MD      Cardiology, Vascular, and Other Imaging  No other imaging results found for the past 7 days      Assessment  Genet Wilks IS 75 y.o. female  on day  0 of admission presenting with COPD (chronic obstructive pulmonary disease) (Multi). Actively being treated for the  following medical Problems:    Acute on chronic hypoxic respiratory failure  Acute exacerbation of COPD    Recommendation:  Patient chest x-ray showed chronic infiltrate, no acute findings to indicate active pneumonia  Symptoms most consistent with COPD exacerbation in the context of a viral syndrome  Agree with IV Rocephin/azithromycin  Oxygen for saturation of 89 to 94%  Incentive spirometry  Bronchodilators therapy as needed  PT/OT  DVT prophylaxis  Minimize benzodiazepine and narcotics  Encourage ambulation  Head evaluation and aspiration precautions.        Ava León MD  Pulmonary critical care consultant  04/17/25  11:33 AM       STAFF PHYSICIAN NOTE OF PERSONAL INVOLVEMENT IN CARE  As the attending physician, I certify that I personally reviewed the patient's history and personally examined the patient to confirm the physical findings described above, and that I reviewed the relevant  imaging studies and available reports.  I also discussed the differential diagnosis and all of the proposed management plans with the patient and individuals accompanying the patient to this visit.  They had the opportunity to ask questions about the proposed management plans and to have those questions answered.           [1] amLODIPine, 5 mg, oral, Daily  aspirin, 81 mg, oral, Daily before lunch  atorvastatin, 20 mg, oral, Nightly  azithromycin, 500 mg, intravenous, q24h  cholecalciferol, 50 mcg, oral, Daily  ferrous sulfate, 65 mg of iron, oral, Daily with breakfast  furosemide, 20 mg, intravenous, BID  insulin lispro, 0-10 Units, subcutaneous, Before meals & nightly  ipratropium-albuteroL, 3 mL, nebulization, q8h  levothyroxine, 75 mcg, oral, Daily before breakfast  methylPREDNISolone sodium succinate (PF), 40 mg, intravenous, q8h KIMBER  metoprolol succinate XL, 25 mg, oral, Daily  warfarin, 2.5 mg, oral, Once per day on Sunday Tuesday Wednesday Thursday Friday Saturday  [START ON 4/21/2025] warfarin, 5 mg, oral, Every Monday  [2]    [3] PRN medications: acetaminophen, albuterol, benzonatate, dextrose, dextrose, glucagon, glucagon, melatonin, ondansetron, oxygen

## 2025-04-17 NOTE — PROGRESS NOTES
Occupational Therapy    Evaluation    Patient Name: Genet Wilks  MRN: [id number]  Department: [facility]  Room: [address] [place] [address]  Today's Date: 4/17/2025  Time Calculation  Start Time: 1015  Stop Time: 1027  Time Calculation (min): 12 min    Assessment  IP OT Assessment  OT Assessment: pt. is independent/dependent with all adl/transfers/mobility, no skilled OT needs  Prognosis: Good  Barriers to Discharge Home: No anticipated barriers  End of Session Communication: Bedside nurse  End of Session Patient Position: Bed, 2 rail up, Alarm off, not on at start of session  Plan:  No Skilled OT: No acute OT goals identified  OT Frequency: OT eval only  OT Discharge Recommendations: No OT needed after discharge  OT Recommended Transfer Status: Independent  OT - OK to Discharge: Yes (to next level of care when cleared by medical team)    Subjective   Current Problem:  1. COPD exacerbation (Multi)        2. Subtherapeutic anticoagulation          General:  General  Reason for Referral: impaired adl;  pt. admitted with sob, started wheezing, primary care gave antibiotic, started having diarrhea/vomiting, dx:  pneumonia, copd exac  Referred By: Deana  Past Medical History Relevant to Rehab: a fib on ac, dm, cad, chf, copd, htn, hld, asthma, o2 at bedtime, mi  Co-Treatment: PT  Co-Treatment Reason: to maximize patient safety/abilities  Prior to Session Communication: Bedside nurse  Patient Position Received: Bed, 2 rail up, Alarm off, not on at start of session  General Comment: pt. agreeable to therapy intervention  Precautions:  Precautions Comment: VSS, tele     Date/Time Vitals Session Patient Position Pulse Resp SpO2 BP MAP (mmHg)    04/17/25 1343 --  --  69  22  98 %  132/58  84                Pain:  Pain Assessment  Pain Assessment: 0-10  0-10 (Numeric) Pain Score: 0 - No pain    Objective   Cognition:  Overall Cognitive Status: Within Functional Limits           Home Living:  Home Living Comments: pt. lives with son and DIL, 2  grandchildren, 1 floor, 3 step entry  with rail, tub/shower with gb, st. toilet, st. bed   Prior Function:  Prior Function Comments: independent with adl/shares iadl/drives  IADL History:     ADL:  ADL Comments: pt. able to complete toileting/managing clothing independently  Activity Tolerance:  Endurance: Endurance does not limit participation in activity  Bed Mobility/Transfers: Bed Mobility  Bed Mobility:  (sit <> supine:  independent)    Transfers  Transfer:  (sit<> stand eob and low toilet:  independent)      Functional Mobility:  Functional Mobility  Functional Mobility Performed:  (no use of device, independent within room bed<> bathroom)  Sensation:  Sensation Comment: sensation intact  Strength:  Strength Comments: bue's at least 3/5 per observation  Coordination:  Movements are Fluid and Coordinated: Yes   Outcome Measures: [facility] Daily Activity  Putting on and taking off regular lower body clothing: None  Bathing (including washing, rinsing, drying): None  Putting on and taking off regular upper body clothing: None  Toileting, which includes using toilet, bedpan or urinal: None  Taking care of personal grooming such as brushing teeth: None  Eating Meals: None  Daily Activity - Total Score: 24

## 2025-04-18 LAB
GLUCOSE BLD MANUAL STRIP-MCNC: 179 MG/DL (ref 74–99)
GLUCOSE BLD MANUAL STRIP-MCNC: 203 MG/DL (ref 74–99)
GLUCOSE BLD MANUAL STRIP-MCNC: 275 MG/DL (ref 74–99)
GLUCOSE BLD MANUAL STRIP-MCNC: 397 MG/DL (ref 74–99)
GLUCOSE BLD MANUAL STRIP-MCNC: 402 MG/DL (ref 74–99)

## 2025-04-18 PROCEDURE — 2500000004 HC RX 250 GENERAL PHARMACY W/ HCPCS (ALT 636 FOR OP/ED): Mod: JZ

## 2025-04-18 PROCEDURE — 2500000002 HC RX 250 W HCPCS SELF ADMINISTERED DRUGS (ALT 637 FOR MEDICARE OP, ALT 636 FOR OP/ED): Performed by: NURSE PRACTITIONER

## 2025-04-18 PROCEDURE — 2500000002 HC RX 250 W HCPCS SELF ADMINISTERED DRUGS (ALT 637 FOR MEDICARE OP, ALT 636 FOR OP/ED)

## 2025-04-18 PROCEDURE — 94640 AIRWAY INHALATION TREATMENT: CPT

## 2025-04-18 PROCEDURE — 2500000002 HC RX 250 W HCPCS SELF ADMINISTERED DRUGS (ALT 637 FOR MEDICARE OP, ALT 636 FOR OP/ED): Mod: JZ | Performed by: STUDENT IN AN ORGANIZED HEALTH CARE EDUCATION/TRAINING PROGRAM

## 2025-04-18 PROCEDURE — 99232 SBSQ HOSP IP/OBS MODERATE 35: CPT | Performed by: STUDENT IN AN ORGANIZED HEALTH CARE EDUCATION/TRAINING PROGRAM

## 2025-04-18 PROCEDURE — 1200000002 HC GENERAL ROOM WITH TELEMETRY DAILY

## 2025-04-18 PROCEDURE — 2500000001 HC RX 250 WO HCPCS SELF ADMINISTERED DRUGS (ALT 637 FOR MEDICARE OP)

## 2025-04-18 PROCEDURE — 82947 ASSAY GLUCOSE BLOOD QUANT: CPT

## 2025-04-18 RX ORDER — WARFARIN SODIUM 5 MG/1
5 TABLET ORAL
Status: DISCONTINUED | OUTPATIENT
Start: 2025-04-21 | End: 2025-04-23 | Stop reason: HOSPADM

## 2025-04-18 RX ADMIN — LEVOTHYROXINE SODIUM 75 MCG: 0.07 TABLET ORAL at 05:27

## 2025-04-18 RX ADMIN — INSULIN LISPRO 6 UNITS: 100 INJECTION, SOLUTION INTRAVENOUS; SUBCUTANEOUS at 10:18

## 2025-04-18 RX ADMIN — FUROSEMIDE 20 MG: 10 INJECTION, SOLUTION INTRAMUSCULAR; INTRAVENOUS at 20:36

## 2025-04-18 RX ADMIN — INSULIN LISPRO 4 UNITS: 100 INJECTION, SOLUTION INTRAVENOUS; SUBCUTANEOUS at 21:49

## 2025-04-18 RX ADMIN — ATORVASTATIN CALCIUM 20 MG: 20 TABLET, FILM COATED ORAL at 20:36

## 2025-04-18 RX ADMIN — INSULIN LISPRO 10 UNITS: 100 INJECTION, SOLUTION INTRAVENOUS; SUBCUTANEOUS at 12:43

## 2025-04-18 RX ADMIN — FUROSEMIDE 20 MG: 10 INJECTION, SOLUTION INTRAMUSCULAR; INTRAVENOUS at 10:29

## 2025-04-18 RX ADMIN — ASPIRIN 81 MG: 81 TABLET, COATED ORAL at 10:40

## 2025-04-18 RX ADMIN — IPRATROPIUM BROMIDE AND ALBUTEROL SULFATE 3 ML: .5; 3 SOLUTION RESPIRATORY (INHALATION) at 22:53

## 2025-04-18 RX ADMIN — Medication 50 MCG: at 10:38

## 2025-04-18 RX ADMIN — FERROUS SULFATE TAB 325 MG (65 MG ELEMENTAL FE) 325 MG: 325 (65 FE) TAB at 10:39

## 2025-04-18 RX ADMIN — METHYLPREDNISOLONE SODIUM SUCCINATE 40 MG: 40 INJECTION, POWDER, FOR SOLUTION INTRAMUSCULAR; INTRAVENOUS at 22:49

## 2025-04-18 RX ADMIN — METOPROLOL SUCCINATE 25 MG: 25 TABLET, EXTENDED RELEASE ORAL at 10:39

## 2025-04-18 RX ADMIN — AMLODIPINE BESYLATE 5 MG: 5 TABLET ORAL at 10:39

## 2025-04-18 RX ADMIN — AZITHROMYCIN MONOHYDRATE 500 MG: 500 INJECTION, POWDER, LYOPHILIZED, FOR SOLUTION INTRAVENOUS at 10:44

## 2025-04-18 RX ADMIN — INSULIN LISPRO 2 UNITS: 100 INJECTION, SOLUTION INTRAVENOUS; SUBCUTANEOUS at 18:08

## 2025-04-18 RX ADMIN — METHYLPREDNISOLONE SODIUM SUCCINATE 40 MG: 40 INJECTION, POWDER, FOR SOLUTION INTRAMUSCULAR; INTRAVENOUS at 14:27

## 2025-04-18 RX ADMIN — WARFARIN SODIUM 2.5 MG: 2.5 TABLET ORAL at 18:08

## 2025-04-18 RX ADMIN — IPRATROPIUM BROMIDE AND ALBUTEROL SULFATE 3 ML: .5; 3 SOLUTION RESPIRATORY (INHALATION) at 06:51

## 2025-04-18 RX ADMIN — METHYLPREDNISOLONE SODIUM SUCCINATE 40 MG: 40 INJECTION, POWDER, FOR SOLUTION INTRAMUSCULAR; INTRAVENOUS at 05:27

## 2025-04-18 ASSESSMENT — COGNITIVE AND FUNCTIONAL STATUS - GENERAL
CLIMB 3 TO 5 STEPS WITH RAILING: A LITTLE
MOBILITY SCORE: 20
MOBILITY SCORE: 19
MOVING TO AND FROM BED TO CHAIR: A LITTLE
STANDING UP FROM CHAIR USING ARMS: A LITTLE
STANDING UP FROM CHAIR USING ARMS: A LITTLE
DAILY ACTIVITIY SCORE: 24
WALKING IN HOSPITAL ROOM: A LITTLE
TOILETING: A LITTLE
CLIMB 3 TO 5 STEPS WITH RAILING: A LOT
MOVING TO AND FROM BED TO CHAIR: A LITTLE
DAILY ACTIVITIY SCORE: 23
WALKING IN HOSPITAL ROOM: A LITTLE

## 2025-04-18 ASSESSMENT — PAIN SCALES - GENERAL
PAINLEVEL_OUTOF10: 0 - NO PAIN
PAINLEVEL_OUTOF10: 0 - NO PAIN

## 2025-04-18 NOTE — CARE PLAN
The patient's goals for the shift include Pt's pulse ox will remain greater than 92% during shift.    The clinical goals for the shift include Patient's pulse ox will remain greater than 92% for entire shift.

## 2025-04-18 NOTE — DOCUMENTATION CLARIFICATION NOTE
"    PATIENT:               MELINA HATFIELD  ACCT #:                  [id number]  MRN:                       [id number]  :                       1950  ADMIT DATE:       2025 11:44 AM  DISCH DATE:  RESPONDING PROVIDER #:        [id number]          PROVIDER RESPONSE TEXT:    Acute on Chronic Diastolic Congestive Heart Failure    CDI QUERY TEXT:    Clarification    Instruction:    Based on your assessment of the patient and the clinical information, please provide the requested documentation by clicking on the appropriate radio button and enter any additional information if prompted.    Question: Please further clarify the type and acuity of congestive heart failure    When answering this query, please exercise your independent professional judgment. The fact that a question is being asked, does not imply that any particular answer is desired or expected.    The patient's clinical indicators include:  Clinical Information: 75-year-old female who presents to the ED with shortness of breath.    Clinical Indicators:  Labs:  , Troponin 24,19  Vital Signs:  (arrival) T 36.1, HR , RR 16, //76, SPO2 93% on RA    2025 Echo: \"CONCLUSIONS:  1. Left ventricular ejection fraction is low normal, calculated by Cain's biplane at 55%.  2. Left ventricular diastolic filling is indeterminate.  3. The left atrium is moderately dilated.  4. There is normal right ventricular global systolic function.  5. Mild aortic valve stenosis.  6. Moderate aortic valve regurgitation.\"     ED Note: \"history of heart failure with preserved EF\"     H&P: \"Patient has a past medical history of MI, atrial valve replacement, A-fib on Coumadin, CAD, heart failure  CHF exacerbation\"     Cardiology Consult: \"Combination of COPD exacerbation with heart failure exacerbation.  Improving with treatment.  - Suspect patient needs additional day of IV diuresis prior to transition to oral meds\"    Treatment: IV " Lasix 20mg BID, Lasix 40mg IV x1    Risk Factors: History of CHF, atrial valve replacement, A Fib, CAD, home oxygen at night  Options provided:  -- Acute on Chronic Diastolic Congestive Heart Failure  -- Acute combined systolic/diastolic Congestive Heart Failure  -- Other - I will add my own diagnosis  -- Refer to Clinical Documentation Reviewer    Query created by: Kerline Mendez on 4/18/2025 10:15 AM      Electronically signed by:  BJORN GEORGES DO 4/18/2025 11:03 AM

## 2025-04-18 NOTE — PROGRESS NOTES
Subjective Data:  Stable night.  Modest improvement with breathing over last 2 to 4 hours.  Urine output not documented.     Objective Data:  Last Recorded Vitals:  Vitals:    04/17/25 2255 04/18/25 0359 04/18/25 0651 04/18/25 1025   BP:  135/61  134/63   BP Location:    Left arm   Patient Position:    Sitting   Pulse:  69  82   Resp:    18   Temp:  35.1 °C (95.2 °F)  36.4 °C (97.5 °F)   TempSrc:    Temporal   SpO2: 98% 98% 100% 96%   Weight:       Height:           Last Labs:  CBC - 4/16/2025: 12:12 PM  4.8 12.2 242    40.3      CMP - 4/17/2025:  7:22 AM  9.1 7.5 16 --- 1.0   _ 3.9 10 63      PTT - 4/17/2025:  7:22 AM  1.8   19.9 27     TROPHS   Date/Time Value Ref Range Status   04/17/2025 07:22 AM 11 0 - 13 ng/L Final   04/16/2025 01:20 PM 19 0 - 13 ng/L Final   04/16/2025 12:12 PM 24 0 - 13 ng/L Final     BNP   Date/Time Value Ref Range Status   04/16/2025 12:12  0 - 99 pg/mL Final   12/31/2024 11:41  0 - 99 pg/mL Final     HGBA1C   Date/Time Value Ref Range Status   02/03/2025 03:45 PM 8.5 4.2 - 6.5 % Final   09/09/2024 04:20 PM 7.6 4.2 - 6.5 % Final   02/02/2023 02:04 PM 6.2 4.2 - 6.5 % Final   05/04/2022 03:50 PM 6.1 4.2 - 6.5 % Final     LDLCALC   Date/Time Value Ref Range Status   09/09/2024 04:30 PM 98 <=99 mg/dL Final     Comment:                                 Near   Borderline      AGE      Desirable  Optimal    High     High     Very High     0-19 Y     0 - 109     ---    110-129   >/= 130     ----    20-24 Y     0 - 119     ---    120-159   >/= 160     ----      >24 Y     0 -  99   100-129  130-159   160-189     >/=190     11/09/2023 03:26 PM 85 <=99 mg/dL Final     Comment:                                 Near   Borderline      AGE      Desirable  Optimal    High     High     Very High     0-19 Y     0 - 109     ---    110-129   >/= 130     ----    20-24 Y     0 - 119     ---    120-159   >/= 160     ----      >24 Y     0 -  99   100-129  130-159   160-189     >/=190       VLDL    Date/Time Value Ref Range Status   09/09/2024 04:30 PM 28 0 - 40 mg/dL Final   11/09/2023 03:26 PM 30 0 - 40 mg/dL Final   05/11/2023 01:57 PM 31 0 - 40 mg/dL Final   02/02/2023 07:44 AM 28 0 - 40 mg/dL Final   05/04/2022 12:00 AM 22 0 - 40 mg/dL Final      Last I/O:  No intake/output data recorded.    Past Cardiology Tests (Last 3 Years):  EKG:  ECG 12 Lead 01/23/2025      Electrocardiogram, 12-lead PRN ACS symptoms 01/03/2025      ECG 12 lead 12/31/2024      ECG 12 lead (Ancillary Performed) 12/17/2024      ECG 12 Lead 09/05/2024      ECG 12 Lead 03/20/2024      ECG 12 Lead 12/11/2023    Echo:  Transthoracic Echo (TTE) Complete 01/06/2025      Transthoracic Echo (TTE) Complete 03/20/2024    Ejection Fractions:  EF   Date/Time Value Ref Range Status   01/06/2025 09:45 AM 55 %    03/20/2024 02:02 PM 63 %      Cath:  No results found for this or any previous visit from the past 1095 days.    Stress Test:  No results found for this or any previous visit from the past 1095 days.    Cardiac Imaging:  No results found for this or any previous visit from the past 1095 days.      Inpatient Medications:  Scheduled Medications[1]  PRN Medications[2]  Continuous Medications[3]    Physical Exam:  General: No acute distress,  A&O x3, supplemental oxygen  Skin: Warm and dry  Neck: JVD is not elevated  ENT: Moist mucous membranes no lesions appreciated  Pulmonary: Rhonchi in anterior lung fields  Cards: Regular rate rhythm, no murmurs gallops or rubs appreciated normal S1-S2  Abdomen: Soft nontender nondistended  Extremities: No edema or cyanosis  Psych: Appropriate mood and affect         Assessment/Plan  1.  Acute on chronic respiratory failure: Appears provoked in setting of upper respiratory infection.  Combination of COPD exacerbation with heart failure exacerbation.  Improving with treatment.  - Continue diuresis with IV Lasix aiming for goal of -1-2 L over next 24 hours  Continue antibiotics and steroids which is managed  by primary team-     2.  History of mitral valve disease status post mechanical mitral valve prosthesis subtherapeutic INR.  Goal INR is between 2.5-3.5 given the presence of prosthetic mitral valve.  Labs today are pending.  - Continue aspirin 81 mg daily     3.  History of CAD/MI: No chest pain.     4.  History of atrial fibrillation: Appears permanent as patient is atrial fibrillation today.  She is on warfarin for CVA prophylaxis.     (This note was generated with voice recognition software and may contain errors including spelling, grammar, syntax and missed recognition of what was dictated, of which may not have been fully corrected)     Code Status:  Full Code    Mich Plummer MD PhD       [1]   Scheduled medications   Medication Dose Route Frequency    amLODIPine  5 mg oral Daily    aspirin  81 mg oral Daily before lunch    atorvastatin  20 mg oral Nightly    azithromycin  500 mg intravenous q24h    cholecalciferol  50 mcg oral Daily    ferrous sulfate  65 mg of iron oral Daily with breakfast    furosemide  20 mg intravenous BID    insulin lispro  0-10 Units subcutaneous Before meals & nightly    ipratropium-albuteroL  3 mL nebulization q8h    levothyroxine  75 mcg oral Daily before breakfast    methylPREDNISolone sodium succinate (PF)  40 mg intravenous q8h KIMBER    metoprolol succinate XL  25 mg oral Daily    warfarin  2.5 mg oral Once per day on Sunday Tuesday Wednesday Thursday Friday Saturday    [START ON 4/21/2025] warfarin  5 mg oral Every Monday   [2]   PRN medications   Medication    acetaminophen    albuterol    benzonatate    dextrose    dextrose    glucagon    glucagon    melatonin    ondansetron    oxygen   [3]   Continuous Medications   Medication Dose Last Rate

## 2025-04-18 NOTE — PROGRESS NOTES
25 1544   Discharge Planning   Living Arrangements Family members;Children   Support Systems Family members;Children   Assistance Needed None   Type of Residence Private residence   Number of Stairs to Enter Residence 4   Do you have animals or pets at home? Yes   Type of Animals or Pets 1 dog   Who is requesting discharge planning? Provider   Home or Post Acute Services None   Expected Discharge Disposition Home   Does the patient need discharge transport arranged? No     TCC Note: Met with pt. at bedside, introduced self and role on the Transition of Care Team. Verified name, , demographics, insurance, PCP is Mitesh Sparks. Emergency contact is Boris Carreno Phone: [phone] or Rhonda TOLEDO, phone: [phone]. Pt. lives with family in a multi level home with 4 KERON and has a first floor bed/bath. Pt. is Independent with ADLs, drives and works. Pt. denies any recent falls and does not use an assistive device for ambulation. Pt. is not diabetic and does use home O2 2L NC prn and at night. Preferred pharmacy is [facility].  No problems affording or obtaining medications. Pt. states that she has no discharge needs at this time. Family will transport home. Transitions of Care will continue to follow until discharge. Kylie Rocha, MSN, RN,

## 2025-04-18 NOTE — PROGRESS NOTES
"Genet Wilks is a 75 y.o. female on day 1 of admission presenting with COPD (chronic obstructive pulmonary disease) (Multi).    Subjective   Still wheezing, contineu treatments       Objective     Physical Exam  Constitutional:       Appearance: Normal appearance.   HENT:      Head: Normocephalic and atraumatic.      Right Ear: Tympanic membrane and ear canal normal.      Left Ear: Tympanic membrane and ear canal normal.      Mouth/Throat:      Mouth: Mucous membranes are moist.      Pharynx: Oropharynx is clear.   Eyes:      Extraocular Movements: Extraocular movements intact.      Conjunctiva/sclera: Conjunctivae normal.      Pupils: Pupils are equal, round, and reactive to light.   Cardiovascular:      Rate and Rhythm: Normal rate and regular rhythm.      Pulses: Normal pulses.      Heart sounds: Normal heart sounds.   Pulmonary:      Effort: Pulmonary effort is normal.      Breath sounds: Normal breath sounds.   Abdominal:      General: Abdomen is flat. Bowel sounds are normal.      Palpations: Abdomen is soft.   Musculoskeletal:         General: Normal range of motion.      Cervical back: Normal range of motion and neck supple.   Skin:     General: Skin is warm and dry.      Capillary Refill: Capillary refill takes 2 to 3 seconds.   Neurological:      General: No focal deficit present.      Mental Status: She is alert and oriented to person, place, and time. Mental status is at baseline.   Psychiatric:         Mood and Affect: Mood normal.         Behavior: Behavior normal.         Thought Content: Thought content normal.         Judgment: Judgment normal.         Last Recorded Vitals  Blood pressure 135/61, pulse 69, temperature 35.1 °C (95.2 °F), resp. rate 22, height 1.575 m (5' 2\"), weight 95.3 kg (210 lb), SpO2 100%.  Intake/Output last 3 Shifts:  No intake/output data recorded.    Relevant Results                              Assessment & Plan  COPD (chronic obstructive pulmonary disease) (Multi)    CHF " exacerbation (Multi)    Bilateral pneumonia    Diarrhea    COPD exacerbation (Multi)    Patient arrived to ED today after having symptoms of shortness of breath for 6 days.  Patient typically only wears oxygen at night for her COPD.  Patient has been wearing oxygen during the day for the past 5 days and has had to increase it to 3 L nasal cannula from 2 L.  Patient called her doctor to get an appointment was able to get in but had an antibiotic ordered which she has been taking without improvement in her symptoms.  Chest x-ray today in ED revealed mild vascular congestion with bilateral faint hazy opacities in the inferior lung bases which is similar to prior chest x-ray in January in which patient had pneumonia.  Patient had mild elevated BNP today.  Pulmonology consult placed and cardiology consult placed, appreciate recs.  Pending patient's INR levels, other antibiotics may need to be considered due to Coumadin.     Patient admitted to Dr. Rod burgos for further medical management.      # Pneumonia  # COPD exacerbation  # Acute hypoxia  # Shortness of breath  # Diarrhea  -Antibiotics initiated in ED, will continue azithromycin (check daily INR)  -Procalcitonin ordered  -Flu/COVID/RSV ordered  -DuoNeb scheduled, albuterol as needed  -Mucinex twice daily  -Tessalon Perles  -Solu-Medrol every 8  -Strep pneumo/Legionella urine  -As needed oxygen >92%  -Cardiac diet   -admitted to observation with telemetry    - q4 vitals    -morning labs, CBC, BMP  - Chest x-ray revealed faint hazy opacities representing pneumonia which were similar on prior x-ray in January 2025.     # CHF exacerbation  # Vascular congestion  # Elevated BNP  # Elevated troponin  # Subtherapeutic INR  - Cardiology consult  - Fluid restriction  - Strict I's and O's  - Daily weight  - Last echo 1/6/2025, EF 55%, left atrium moderately dilated, AVR,  - Lasix given in ED 40 mg, will continue 20 mg twice daily  -PT/OT/SW   -Daily INR, trend trop, mg+         Chronic Conditions   # CAD, HLD  # MI, atrial valve replacement, A-fib, CAD  # DM  -Hold home glycemic medication   -SSI with hypoglycemic protocol   -POCT  # Hypothyroidism  # Asthma     Continue home medications as ordered   Full Code      #DVT Prophylaxis   Scd's as tolerated   DVT Prophylaxis   On eliquis     Thorough record review performed of previous labs and notes from prior encounters.      4/17: breathing has improved, still wheezy, continue treatments, suspect dc in next 24-48 hrs      I spent 35 minutes in the professional and overall care of this patient.      Rod Hansen, DO

## 2025-04-18 NOTE — CARE PLAN
The patient's goals for the shift include  sleep, comfort and safety    The clinical goals for the shift include patient will have less shortness of breath throughout entire shift.

## 2025-04-18 NOTE — PROGRESS NOTES
Pulmonary Critical Care note    Patient Name :Genet Wilks  Date of service : 04/18/25  MRN: [id number]  YOB: 1950      Interval History:  4/18/2025  Patient reports improve her shortness of breath, less dyspneic, no fever no chills.  History of Present Illness:      75 y.o. female  on day  1 of admission presenting with COPD (chronic obstructive pulmonary disease) (Multi).  Genet Wilks is a 75-year-old female who presents to the ED with shortness of breath. Patient has a past medical history of asthma, COPD wears at bedtime O2 and PRN, MI, atrial valve replacement, A-fib on Coumadin, CAD, heart failure, DM, HLD, and hypothyroidism. Patient reports on April 11/25 she began noticing increased shortness of breath. On Saturday morning she had to start wearing her oxygen during the day which she only typically wears at nighttime, she later had to increase from 2 LNC to 3 LNC. Patient did not notice much improvement and called her doctor to make an appointment to be seen but he was unable to get her in. Her PCP ordered an antibiotic for her which she was taking at home. Patient began to have diarrhea that was watery but nonbloody on Monday and Tuesday of this week. Patient also had 1 episode of nonbloody emesis. Patient denies any nausea since just a decreased appetite. Patient reports she has been drinking fluids. Patient denies any increase in swelling. Patient denies any fevers at home. Patient does report having a worsening moist cough with a thick sputum production. Patient decided come to the ER today as the shortness of breath has been increasingly getting worse. Patient denies any chest pain, syncopal episodes, urinary symptoms weakness, or new open skin sores.   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, Personal history of other diseases of the circulatory system, Personal history of other diseases of the circulatory system, Personal history of other diseases of the circulatory system, Personal history of other diseases of the musculoskeletal system and connective tissue, Personal history of other diseases of the nervous system and sense organs, Personal history of other diseases of the respiratory system, Personal history of other endocrine, nutritional and metabolic disease, Personal history of other endocrine, nutritional and metabolic disease, Personal history of other endocrine, nutritional and metabolic disease, Personal history of other specified conditions, Presence of other heart-valve replacement, and Presence of prosthetic heart valve (06/02/2022).   has a past surgical history that includes Other surgical history (11/13/2014); Cardiac surgery (11/13/2014); Tubal ligation (11/13/2014); Other surgical history (11/13/2014); Tonsillectomy (11/13/2014); Knee surgery (06/15/2018); and Cardiac catheterization (06/15/2018).   reports that she has never smoked. She has never been exposed to tobacco smoke. She has never used smokeless tobacco. She reports that she does not currently use alcohol. She reports that she does not use drugs.  Family History:   Family medical history includes stroke in father.    Allergies:     Penicillins, Prednisone, Mushroom, and Simvastatin      Social history:   reports that she has never smoked. She has never been exposed to tobacco smoke. She has never used smokeless tobacco. She reports that she does not currently use alcohol. She reports that she does not use drugs.      Review of Systems:   General: denies weight gain, denies loss of appetite, fever, chills, night sweats.  HEENT: denies headaches, dizziness, head trauma, visual changes, eye pain, tinnitus, nosebleeds, hoarseness or throat pain    Respiratory: denies chest pain, dyspnea, cough and  hemoptysis  Cardiovascular: denies orthopnea, paroxysmal nocturnal dyspnea, leg swelling, and previous heart attack.    Gastrointestinal: denies pain, nausea vomiting, diarrhea, constipation, melena or bleeding.  Genitourinary: denies hematuria, frequency, urgency or dysuria  Neurology: denies syncope, seizures, paralysis, paraesthesia   Endocrine: denies polyuria, polydipsia, skin or hair changes, and heat or cold intolerance  Musculoskeletal: denies joint pain, swelling, arthritis or myalgia  Hematologic: denies bleeding, adenopathy and easy bruising  Skin: denies rashes and skin discoloration  Psychiatry: denies depression    Physical Exam:   Vital Signs:   Vitals:    04/18/25 1344   BP: 132/68   Pulse: 68   Resp: 18   Temp: 36.1 °C (97 °F)   SpO2: 96%     Vitals:    04/16/25 1107   Weight: 95.3 kg (210 lb)         Input/Output:  No intake or output data in the 24 hours ending 04/18/25 1650    Oxygen requirements:    Ventilator Information:  FiO2 (%):  [28 %] 28 %  Oxygen Therapy/Pulse Ox  Medical Gas Therapy: None (Room air)  Medical Gas Delivery Method: Nasal cannula  FiO2 (%): 28 %  SpO2: 96 %  Patient Activity During SpO2 Measurement: At rest  Temp: 36.1 °C (97 °F)        General appearance: Not in pain or distress, in no respiratory distress    HEENT: Atraumatic/normocephalic, EOMI, YVETTE, pharynx clear, moist mucosa, redness of the uvula appreciated,   Neck: Supple, no jugular venous distension, lymphadenopathy, thyromegaly or carotid bruits  Chest: Diffuse wheezing or rhonchi hopefully with more mobility agree with the current management of IV Solu-Medrol  P.o. azithromycin  Monitor diarrhea, syndrome is most consistent with viral infection  Cardiovascular: Normal S1, S2, regular rate and rhythm, no murmur, rub or gallop  Abdomen: Normal sounds present, soft, lax with no tenderness, no hepatosplenomegaly, and no masses  Extremities: No edema. Pulses are equally present.   Skin: intact, no rashes    Neurologic: Alert and oriented x 3, No focal deficit         Current Medications:   Scheduled medications  Scheduled Medications[1]  Continuous medications  Continuous Medications[2]  PRN medications  PRN Medications[3]      Investigations:  Labs, radiological imaging and cardiac work up were personally reviewed    Results from last 7 days   Lab Units 04/17/25  0722 04/16/25  1212   SODIUM mmol/L 135* 136   POTASSIUM mmol/L 3.8 3.5   CHLORIDE mmol/L 100 99   CO2 mmol/L 27 29   BUN mg/dL 28* 19   CREATININE mg/dL 0.94 0.97   GLUCOSE mg/dL 290* 155*   CALCIUM mg/dL 9.1 9.4     Results from last 7 days   Lab Units 04/16/25  1212   WBC AUTO x10*3/uL 4.8   HEMOGLOBIN g/dL 12.2   HEMATOCRIT % 40.3   PLATELETS AUTO x10*3/uL 242         Imaging  XR chest 2 views  Result Date: 4/16/2025  Mild vascular congestion. Signed by Alex Prado MD      Cardiology, Vascular, and Other Imaging  No other imaging results found for the past 7 days      Assessment  Genet Wilks IS 75 y.o. female  on day  1 of admission presenting with COPD (chronic obstructive pulmonary disease) (Multi). Actively being treated for the  following medical Problems:    Acute on chronic hypoxic respiratory failure  Acute exacerbation of COPD    Recommendation:  Patient chest x-ray showed chronic infiltrate, no acute findings to indicate active pneumonia  Symptoms most consistent with COPD exacerbation in the context of a viral syndrome  Agree with IV Rocephin/azithromycin  Oxygen for saturation of 89 to 94%  Incentive spirometry  Bronchodilators therapy as needed  PT/OT  DVT prophylaxis  Minimize benzodiazepine and narcotics  Encourage ambulation  Head evaluation and aspiration precautions.        Ava León MD  Pulmonary critical care consultant  04/18/25  4:50 PM       STAFF PHYSICIAN NOTE OF PERSONAL INVOLVEMENT IN CARE  As the attending physician, I certify that I personally reviewed the patient's history and personally examined the patient to  confirm the physical findings described above, and that I reviewed the relevant imaging studies and available reports.  I also discussed the differential diagnosis and all of the proposed management plans with the patient and individuals accompanying the patient to this visit.  They had the opportunity to ask questions about the proposed management plans and to have those questions answered.           [1] amLODIPine, 5 mg, oral, Daily  aspirin, 81 mg, oral, Daily before lunch  atorvastatin, 20 mg, oral, Nightly  azithromycin, 500 mg, intravenous, q24h  cholecalciferol, 50 mcg, oral, Daily  ferrous sulfate, 65 mg of iron, oral, Daily with breakfast  furosemide, 20 mg, intravenous, BID  insulin lispro, 0-10 Units, subcutaneous, Before meals & nightly  ipratropium-albuteroL, 3 mL, nebulization, q8h  levothyroxine, 75 mcg, oral, Daily before breakfast  methylPREDNISolone sodium succinate (PF), 40 mg, intravenous, q8h KIMBER  metoprolol succinate XL, 25 mg, oral, Daily  warfarin, 2.5 mg, oral, Once per day on Sunday Tuesday Wednesday Thursday Friday Saturday  [START ON 4/21/2025] warfarin, 5 mg, oral, Every Monday     [2]    [3] PRN medications: acetaminophen, albuterol, benzonatate, dextrose, dextrose, glucagon, glucagon, melatonin, ondansetron, oxygen

## 2025-04-19 LAB
BACTERIA SPEC RESP CULT: NO GROWTH
GLUCOSE BLD MANUAL STRIP-MCNC: 294 MG/DL (ref 74–99)
GLUCOSE BLD MANUAL STRIP-MCNC: 358 MG/DL (ref 74–99)
GLUCOSE BLD MANUAL STRIP-MCNC: 381 MG/DL (ref 74–99)
GLUCOSE BLD MANUAL STRIP-MCNC: 398 MG/DL (ref 74–99)
GRAM STN SPEC: NORMAL
GRAM STN SPEC: NORMAL

## 2025-04-19 PROCEDURE — 2500000002 HC RX 250 W HCPCS SELF ADMINISTERED DRUGS (ALT 637 FOR MEDICARE OP, ALT 636 FOR OP/ED)

## 2025-04-19 PROCEDURE — 99233 SBSQ HOSP IP/OBS HIGH 50: CPT | Performed by: STUDENT IN AN ORGANIZED HEALTH CARE EDUCATION/TRAINING PROGRAM

## 2025-04-19 PROCEDURE — 82947 ASSAY GLUCOSE BLOOD QUANT: CPT

## 2025-04-19 PROCEDURE — 2500000004 HC RX 250 GENERAL PHARMACY W/ HCPCS (ALT 636 FOR OP/ED): Performed by: STUDENT IN AN ORGANIZED HEALTH CARE EDUCATION/TRAINING PROGRAM

## 2025-04-19 PROCEDURE — 2500000002 HC RX 250 W HCPCS SELF ADMINISTERED DRUGS (ALT 637 FOR MEDICARE OP, ALT 636 FOR OP/ED): Mod: JZ | Performed by: STUDENT IN AN ORGANIZED HEALTH CARE EDUCATION/TRAINING PROGRAM

## 2025-04-19 PROCEDURE — 99232 SBSQ HOSP IP/OBS MODERATE 35: CPT | Performed by: STUDENT IN AN ORGANIZED HEALTH CARE EDUCATION/TRAINING PROGRAM

## 2025-04-19 PROCEDURE — 2500000001 HC RX 250 WO HCPCS SELF ADMINISTERED DRUGS (ALT 637 FOR MEDICARE OP)

## 2025-04-19 PROCEDURE — 2500000004 HC RX 250 GENERAL PHARMACY W/ HCPCS (ALT 636 FOR OP/ED): Mod: JZ

## 2025-04-19 PROCEDURE — 2500000002 HC RX 250 W HCPCS SELF ADMINISTERED DRUGS (ALT 637 FOR MEDICARE OP, ALT 636 FOR OP/ED): Performed by: NURSE PRACTITIONER

## 2025-04-19 PROCEDURE — 94640 AIRWAY INHALATION TREATMENT: CPT

## 2025-04-19 PROCEDURE — 1200000002 HC GENERAL ROOM WITH TELEMETRY DAILY

## 2025-04-19 RX ORDER — GUAIFENESIN 600 MG/1
600 TABLET, EXTENDED RELEASE ORAL 2 TIMES DAILY PRN
Status: DISCONTINUED | OUTPATIENT
Start: 2025-04-19 | End: 2025-04-23 | Stop reason: HOSPADM

## 2025-04-19 RX ORDER — ACETYLCYSTEINE 100 MG/ML
6 SOLUTION ORAL; RESPIRATORY (INHALATION)
Status: DISCONTINUED | OUTPATIENT
Start: 2025-04-19 | End: 2025-04-21

## 2025-04-19 RX ORDER — ACETYLCYSTEINE 100 MG/ML
6 SOLUTION ORAL; RESPIRATORY (INHALATION) 4 TIMES DAILY
Status: DISCONTINUED | OUTPATIENT
Start: 2025-04-19 | End: 2025-04-19

## 2025-04-19 RX ADMIN — METOPROLOL SUCCINATE 25 MG: 25 TABLET, EXTENDED RELEASE ORAL at 08:42

## 2025-04-19 RX ADMIN — ALBUTEROL SULFATE 2.5 MG: 2.5 SOLUTION RESPIRATORY (INHALATION) at 19:53

## 2025-04-19 RX ADMIN — INSULIN LISPRO 6 UNITS: 100 INJECTION, SOLUTION INTRAVENOUS; SUBCUTANEOUS at 08:43

## 2025-04-19 RX ADMIN — IPRATROPIUM BROMIDE AND ALBUTEROL SULFATE 3 ML: .5; 3 SOLUTION RESPIRATORY (INHALATION) at 06:54

## 2025-04-19 RX ADMIN — FERROUS SULFATE TAB 325 MG (65 MG ELEMENTAL FE) 325 MG: 325 (65 FE) TAB at 08:42

## 2025-04-19 RX ADMIN — ASPIRIN 81 MG: 81 TABLET, COATED ORAL at 12:56

## 2025-04-19 RX ADMIN — Medication 50 MCG: at 08:42

## 2025-04-19 RX ADMIN — METHYLPREDNISOLONE SODIUM SUCCINATE 40 MG: 40 INJECTION, POWDER, FOR SOLUTION INTRAMUSCULAR; INTRAVENOUS at 13:57

## 2025-04-19 RX ADMIN — ACETYLCYSTEINE 6 ML: 100 SOLUTION ORAL; RESPIRATORY (INHALATION) at 11:23

## 2025-04-19 RX ADMIN — INSULIN LISPRO 10 UNITS: 100 INJECTION, SOLUTION INTRAVENOUS; SUBCUTANEOUS at 22:08

## 2025-04-19 RX ADMIN — INSULIN LISPRO 10 UNITS: 100 INJECTION, SOLUTION INTRAVENOUS; SUBCUTANEOUS at 18:00

## 2025-04-19 RX ADMIN — FUROSEMIDE 20 MG: 10 INJECTION, SOLUTION INTRAMUSCULAR; INTRAVENOUS at 22:08

## 2025-04-19 RX ADMIN — LEVOTHYROXINE SODIUM 75 MCG: 0.07 TABLET ORAL at 06:32

## 2025-04-19 RX ADMIN — IPRATROPIUM BROMIDE AND ALBUTEROL SULFATE 3 ML: .5; 3 SOLUTION RESPIRATORY (INHALATION) at 22:39

## 2025-04-19 RX ADMIN — WARFARIN SODIUM 2.5 MG: 2.5 TABLET ORAL at 16:54

## 2025-04-19 RX ADMIN — AMLODIPINE BESYLATE 5 MG: 5 TABLET ORAL at 08:42

## 2025-04-19 RX ADMIN — FUROSEMIDE 20 MG: 10 INJECTION, SOLUTION INTRAMUSCULAR; INTRAVENOUS at 08:43

## 2025-04-19 RX ADMIN — METHYLPREDNISOLONE SODIUM SUCCINATE 40 MG: 40 INJECTION, POWDER, FOR SOLUTION INTRAMUSCULAR; INTRAVENOUS at 22:08

## 2025-04-19 RX ADMIN — METHYLPREDNISOLONE SODIUM SUCCINATE 40 MG: 40 INJECTION, POWDER, FOR SOLUTION INTRAMUSCULAR; INTRAVENOUS at 06:30

## 2025-04-19 RX ADMIN — ATORVASTATIN CALCIUM 20 MG: 20 TABLET, FILM COATED ORAL at 22:08

## 2025-04-19 RX ADMIN — ACETYLCYSTEINE 6 ML: 100 SOLUTION ORAL; RESPIRATORY (INHALATION) at 22:39

## 2025-04-19 RX ADMIN — INSULIN LISPRO 10 UNITS: 100 INJECTION, SOLUTION INTRAVENOUS; SUBCUTANEOUS at 12:56

## 2025-04-19 RX ADMIN — IPRATROPIUM BROMIDE AND ALBUTEROL SULFATE 3 ML: .5; 3 SOLUTION RESPIRATORY (INHALATION) at 11:20

## 2025-04-19 RX ADMIN — AZITHROMYCIN MONOHYDRATE 500 MG: 500 INJECTION, POWDER, LYOPHILIZED, FOR SOLUTION INTRAVENOUS at 10:17

## 2025-04-19 ASSESSMENT — COGNITIVE AND FUNCTIONAL STATUS - GENERAL
MOBILITY SCORE: 21
CLIMB 3 TO 5 STEPS WITH RAILING: A LOT
DAILY ACTIVITIY SCORE: 24
CLIMB 3 TO 5 STEPS WITH RAILING: A LOT
MOBILITY SCORE: 21
STANDING UP FROM CHAIR USING ARMS: A LITTLE
DAILY ACTIVITIY SCORE: 24
STANDING UP FROM CHAIR USING ARMS: A LITTLE

## 2025-04-19 ASSESSMENT — PAIN SCALES - GENERAL
PAINLEVEL_OUTOF10: 0 - NO PAIN
PAINLEVEL_OUTOF10: 0 - NO PAIN

## 2025-04-19 NOTE — PROGRESS NOTES
Subjective Data:  Still has cough.  Slightly feeling better.  In atrial fibrillation with heart rate in 70s.  On low oxygen supplementation.     Objective Data:  Last Recorded Vitals:  Vitals:    04/18/25 2253 04/19/25 0156 04/19/25 0614 04/19/25 0654   BP:  149/65 163/70    BP Location:  Left arm Left arm    Patient Position:  Lying Lying    Pulse:  58 63    Resp:  16 16    Temp:  35.3 °C (95.5 °F) 35.6 °C (96.1 °F)    TempSrc:  Temporal Temporal    SpO2: 96% 98% 99% 95%   Weight:       Height:           Last Labs:  CBC - 4/16/2025: 12:12 PM  4.8 12.2 242    40.3      CMP - 4/17/2025:  7:22 AM  9.1 7.5 16 --- 1.0   _ 3.9 10 63      PTT - 4/17/2025:  7:22 AM  1.8   19.9 27     TROPHS   Date/Time Value Ref Range Status   04/17/2025 07:22 AM 11 0 - 13 ng/L Final   04/16/2025 01:20 PM 19 0 - 13 ng/L Final   04/16/2025 12:12 PM 24 0 - 13 ng/L Final     BNP   Date/Time Value Ref Range Status   04/16/2025 12:12  0 - 99 pg/mL Final   12/31/2024 11:41  0 - 99 pg/mL Final     HGBA1C   Date/Time Value Ref Range Status   02/03/2025 03:45 PM 8.5 4.2 - 6.5 % Final   09/09/2024 04:20 PM 7.6 4.2 - 6.5 % Final   02/02/2023 02:04 PM 6.2 4.2 - 6.5 % Final   05/04/2022 03:50 PM 6.1 4.2 - 6.5 % Final     LDLCALC   Date/Time Value Ref Range Status   09/09/2024 04:30 PM 98 <=99 mg/dL Final     Comment:                                 Near   Borderline      AGE      Desirable  Optimal    High     High     Very High     0-19 Y     0 - 109     ---    110-129   >/= 130     ----    20-24 Y     0 - 119     ---    120-159   >/= 160     ----      >24 Y     0 -  99   100-129  130-159   160-189     >/=190     11/09/2023 03:26 PM 85 <=99 mg/dL Final     Comment:                                 Near   Borderline      AGE      Desirable  Optimal    High     High     Very High     0-19 Y     0 - 109     ---    110-129   >/= 130     ----    20-24 Y     0 - 119     ---    120-159   >/= 160     ----      >24 Y     0 -  99   100-129  130-159    160-189     >/=190       VLDL   Date/Time Value Ref Range Status   09/09/2024 04:30 PM 28 0 - 40 mg/dL Final   11/09/2023 03:26 PM 30 0 - 40 mg/dL Final   05/11/2023 01:57 PM 31 0 - 40 mg/dL Final   02/02/2023 07:44 AM 28 0 - 40 mg/dL Final   05/04/2022 12:00 AM 22 0 - 40 mg/dL Final      Last I/O:  No intake/output data recorded.    Past Cardiology Tests (Last 3 Years):  EKG:  ECG 12 Lead 01/23/2025      Electrocardiogram, 12-lead PRN ACS symptoms 01/03/2025      ECG 12 lead 12/31/2024      ECG 12 lead (Ancillary Performed) 12/17/2024      ECG 12 Lead 09/05/2024      ECG 12 Lead 03/20/2024      ECG 12 Lead 12/11/2023    Echo:  Transthoracic Echo (TTE) Complete 01/06/2025      Transthoracic Echo (TTE) Complete 03/20/2024    Ejection Fractions:  EF   Date/Time Value Ref Range Status   01/06/2025 09:45 AM 55 %    03/20/2024 02:02 PM 63 %      Cath:  No results found for this or any previous visit from the past 1095 days.    Stress Test:  No results found for this or any previous visit from the past 1095 days.    Cardiac Imaging:  No results found for this or any previous visit from the past 1095 days.      Inpatient Medications:  Scheduled Medications[1]  PRN Medications[2]  Continuous Medications[3]    Physical Exam:  General: No acute distress,  A&O x3, supplemental oxygen  Skin: Warm and dry  Neck: JVD is not elevated  ENT: Moist mucous membranes no lesions appreciated  Pulmonary: Rhonchi in anterior lung fields  Cards: Regular rate rhythm, no murmurs gallops or rubs appreciated normal S1-S2  Abdomen: Soft nontender nondistended  Extremities: No edema or cyanosis  Psych: Appropriate mood and affect         Assessment/Plan  1.  Acute on chronic respiratory failure: Appears provoked in setting of upper respiratory infection.  Combination of COPD exacerbation with heart failure exacerbation.  Improving with treatment.  - Continue diuresis with IV Lasix aiming for goal of -1-2 L over next 24 hours  Continue antibiotics  and steroids which is managed by primary team-     2.  History of mitral valve disease status post mechanical mitral valve prosthesis subtherapeutic INR.  Goal INR is between 2.5-3.5 given the presence of prosthetic mitral valve.  Labs today are pending.  - Continue aspirin 81 mg daily     3.  History of CAD/MI: No chest pain.     4.  History of atrial fibrillation: Appears permanent as patient is atrial fibrillation today.  She is on warfarin for CVA prophylaxis.     (This note was generated with voice recognition software and may contain errors including spelling, grammar, syntax and missed recognition of what was dictated, of which may not have been fully corrected)       April 19, 2025  Patient reports that her shortness of breath is better but is still not at her baseline and especially the cough bothers her.  Will continue with current plan for diuresis and we will obtain labs tomorrow morning.  Need to make sure the potassium is above 4 and magnesium above 2.  Will also recheck INR.  Her heart rate and atrial fibrillation were well-controlled.  I will follow-up with her tomorrow.  Plan for possible discharge early next week    Code Status:  Full Code    Luis Chapa MD PhD       [1]   Scheduled medications   Medication Dose Route Frequency    acetylcysteine  6 mL nebulization q8h    amLODIPine  5 mg oral Daily    aspirin  81 mg oral Daily before lunch    atorvastatin  20 mg oral Nightly    azithromycin  500 mg intravenous q24h    cholecalciferol  50 mcg oral Daily    ferrous sulfate  65 mg of iron oral Daily with breakfast    furosemide  20 mg intravenous BID    insulin lispro  0-10 Units subcutaneous Before meals & nightly    ipratropium-albuteroL  3 mL nebulization q8h    levothyroxine  75 mcg oral Daily before breakfast    methylPREDNISolone sodium succinate (PF)  40 mg intravenous q8h KIMBER    metoprolol succinate XL  25 mg oral Daily    warfarin  2.5 mg oral Once per day on Sunday Tuesday Wednesday  Thursday Friday Saturday    [START ON 4/21/2025] warfarin  5 mg oral Every Monday   [2]   PRN medications   Medication    acetaminophen    albuterol    benzonatate    dextrose    dextrose    glucagon    glucagon    guaiFENesin    melatonin    ondansetron    oxygen   [3]   Continuous Medications   Medication Dose Last Rate

## 2025-04-19 NOTE — PROGRESS NOTES
"Genet Wilks is a 75 y.o. female on day 2 of admission presenting with COPD (chronic obstructive pulmonary disease) (Multi).    Subjective   Still wheezing, contineu treatments       Objective     Physical Exam  Constitutional:       Appearance: Normal appearance.   HENT:      Head: Normocephalic and atraumatic.      Right Ear: Tympanic membrane and ear canal normal.      Left Ear: Tympanic membrane and ear canal normal.      Mouth/Throat:      Mouth: Mucous membranes are moist.      Pharynx: Oropharynx is clear.   Eyes:      Extraocular Movements: Extraocular movements intact.      Conjunctiva/sclera: Conjunctivae normal.      Pupils: Pupils are equal, round, and reactive to light.   Cardiovascular:      Rate and Rhythm: Normal rate and regular rhythm.      Pulses: Normal pulses.      Heart sounds: Normal heart sounds.   Pulmonary:      Effort: Pulmonary effort is normal.      Breath sounds: Normal breath sounds.   Abdominal:      General: Abdomen is flat. Bowel sounds are normal.      Palpations: Abdomen is soft.   Musculoskeletal:         General: Normal range of motion.      Cervical back: Normal range of motion and neck supple.   Skin:     General: Skin is warm and dry.      Capillary Refill: Capillary refill takes 2 to 3 seconds.   Neurological:      General: No focal deficit present.      Mental Status: She is alert and oriented to person, place, and time. Mental status is at baseline.   Psychiatric:         Mood and Affect: Mood normal.         Behavior: Behavior normal.         Thought Content: Thought content normal.         Judgment: Judgment normal.         Last Recorded Vitals  Blood pressure 149/63, pulse 72, temperature 35.8 °C (96.4 °F), resp. rate 16, height 1.575 m (5' 2\"), weight 95.3 kg (210 lb), SpO2 99%.  Intake/Output last 3 Shifts:  No intake/output data recorded.    Relevant Results                              Assessment & Plan  COPD (chronic obstructive pulmonary disease) (Multi)    CHF " exacerbation (Multi)    Bilateral pneumonia    Diarrhea    COPD exacerbation (Multi)    Patient arrived to ED today after having symptoms of shortness of breath for 6 days.  Patient typically only wears oxygen at night for her COPD.  Patient has been wearing oxygen during the day for the past 5 days and has had to increase it to 3 L nasal cannula from 2 L.  Patient called her doctor to get an appointment was able to get in but had an antibiotic ordered which she has been taking without improvement in her symptoms.  Chest x-ray today in ED revealed mild vascular congestion with bilateral faint hazy opacities in the inferior lung bases which is similar to prior chest x-ray in January in which patient had pneumonia.  Patient had mild elevated BNP today.  Pulmonology consult placed and cardiology consult placed, appreciate recs.  Pending patient's INR levels, other antibiotics may need to be considered due to Coumadin.     Patient admitted to Dr. Rod burgos for further medical management.      # Pneumonia  # COPD exacerbation  # Acute hypoxia  # Shortness of breath  # Diarrhea  -Antibiotics initiated in ED, will continue azithromycin (check daily INR)  -Procalcitonin ordered  -Flu/COVID/RSV ordered  -DuoNeb scheduled, albuterol as needed  -Mucinex twice daily  -Tessalon Perles  -Solu-Medrol every 8  -Strep pneumo/Legionella urine  -As needed oxygen >92%  -Cardiac diet   -admitted to observation with telemetry    - q4 vitals    -morning labs, CBC, BMP  - Chest x-ray revealed faint hazy opacities representing pneumonia which were similar on prior x-ray in January 2025.     # CHF exacerbation  # Vascular congestion  # Elevated BNP  # Elevated troponin  # Subtherapeutic INR  - Cardiology consult  - Fluid restriction  - Strict I's and O's  - Daily weight  - Last echo 1/6/2025, EF 55%, left atrium moderately dilated, AVR,  - Lasix given in ED 40 mg, will continue 20 mg twice daily  -PT/OT/SW   -Daily INR, trend trop, mg+         Chronic Conditions   # CAD, HLD  # MI, atrial valve replacement, A-fib, CAD  # DM  -Hold home glycemic medication   -SSI with hypoglycemic protocol   -POCT  # Hypothyroidism  # Asthma     Continue home medications as ordered   Full Code      #DVT Prophylaxis   Scd's as tolerated   DVT Prophylaxis   On eliquis     Thorough record review performed of previous labs and notes from prior encounters.      4/17: breathing has improved, still wheezy, continue treatments, suspect dc in next 24-48 hrs    4/19 doing well no issues contineu treatments    I spent 35 minutes in the professional and overall care of this patient.      Rod Hansen, DO

## 2025-04-19 NOTE — CARE PLAN
The patient's goals for the shift include      The clinical goals for the shift include patients O2 level will remain at 89% and above

## 2025-04-19 NOTE — PROGRESS NOTES
Pulmonary Critical Care note    Patient Name :Genet Wilks  Date of service : 04/19/25  MRN: [id number]  YOB: 1950      Interval History:  4/19/2025  Still room air to low-flow oxygen but still have significant wheezing and rhonchi  4/18/2025  Patient reports improve her shortness of breath, less dyspneic, no fever no chills.  History of Present Illness:      75 y.o. female  on day  2 of admission presenting with COPD (chronic obstructive pulmonary disease) (Multi).  Genet Wilks is a 75-year-old female who presents to the ED with shortness of breath. Patient has a past medical history of asthma, COPD wears at bedtime O2 and PRN, MI, atrial valve replacement, A-fib on Coumadin, CAD, heart failure, DM, HLD, and hypothyroidism. Patient reports on April 11/25 she began noticing increased shortness of breath. On Saturday morning she had to start wearing her oxygen during the day which she only typically wears at nighttime, she later had to increase from 2 LNC to 3 LNC. Patient did not notice much improvement and called her doctor to make an appointment to be seen but he was unable to get her in. Her PCP ordered an antibiotic for her which she was taking at home. Patient began to have diarrhea that was watery but nonbloody on Monday and Tuesday of this week. Patient also had 1 episode of nonbloody emesis. Patient denies any nausea since just a decreased appetite. Patient reports she has been drinking fluids. Patient denies any increase in swelling. Patient denies any fevers at home. Patient does report having a worsening moist cough with a thick sputum production. Patient decided come to the ER today as the shortness of breath has been increasingly getting worse. Patient denies any chest pain, syncopal episodes, urinary symptoms weakness, or new open skin sores.   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, Personal history of other diseases of the circulatory system, Personal history of other diseases of the circulatory system, Personal history of other diseases of the circulatory system, Personal history of other diseases of the musculoskeletal system and connective tissue, Personal history of other diseases of the nervous system and sense organs, Personal history of other diseases of the respiratory system, Personal history of other endocrine, nutritional and metabolic disease, Personal history of other endocrine, nutritional and metabolic disease, Personal history of other endocrine, nutritional and metabolic disease, Personal history of other specified conditions, Presence of other heart-valve replacement, and Presence of prosthetic heart valve (06/02/2022).   has a past surgical history that includes Other surgical history (11/13/2014); Cardiac surgery (11/13/2014); Tubal ligation (11/13/2014); Other surgical history (11/13/2014); Tonsillectomy (11/13/2014); Knee surgery (06/15/2018); and Cardiac catheterization (06/15/2018).   reports that she has never smoked. She has never been exposed to tobacco smoke. She has never used smokeless tobacco. She reports that she does not currently use alcohol. She reports that she does not use drugs.  Family History:   Family medical history includes stroke in father.    Allergies:     Penicillins, Prednisone, Mushroom, and Simvastatin      Social history:   reports that she has never smoked. She has never been exposed to tobacco smoke. She has never used smokeless tobacco. She reports that she does not currently use alcohol. She reports that she does not use drugs.      Review of Systems:   General: denies weight gain, denies loss of appetite, fever, chills, night sweats.  HEENT: denies headaches, dizziness, head trauma, visual changes, eye pain, tinnitus,  nosebleeds, hoarseness or throat pain    Respiratory: denies chest pain, dyspnea, cough and hemoptysis  Cardiovascular: denies orthopnea, paroxysmal nocturnal dyspnea, leg swelling, and previous heart attack.    Gastrointestinal: denies pain, nausea vomiting, diarrhea, constipation, melena or bleeding.  Genitourinary: denies hematuria, frequency, urgency or dysuria  Neurology: denies syncope, seizures, paralysis, paraesthesia   Endocrine: denies polyuria, polydipsia, skin or hair changes, and heat or cold intolerance  Musculoskeletal: denies joint pain, swelling, arthritis or myalgia  Hematologic: denies bleeding, adenopathy and easy bruising  Skin: denies rashes and skin discoloration  Psychiatry: denies depression    Physical Exam:   Vital Signs:   Vitals:    04/19/25 1021   BP: 165/73   Pulse: 76   Resp:    Temp: 35.5 °C (95.9 °F)   SpO2: 98%     Vitals:    04/16/25 1107   Weight: 95.3 kg (210 lb)         Input/Output:  No intake or output data in the 24 hours ending 04/19/25 1038    Oxygen requirements:    Ventilator Information:  FiO2 (%):  [21 %] 21 %  Oxygen Therapy/Pulse Ox  Medical Gas Therapy: None (Room air)  Medical Gas Delivery Method: Nasal cannula  FiO2 (%): 21 %  SpO2: 98 %  Patient Activity During SpO2 Measurement: At rest  Temp: 35.5 °C (95.9 °F)        General appearance: Not in pain or distress, in no respiratory distress    HEENT: Atraumatic/normocephalic, EOMI, YVETTE, pharynx clear, moist mucosa, redness of the uvula appreciated,   Neck: Supple, no jugular venous distension, lymphadenopathy, thyromegaly or carotid bruits  Chest: Diffuse wheezing or rhonchi hopefully with more mobility agree with the current management of IV Solu-Medrol  P.o. azithromycin  Monitor diarrhea, syndrome is most consistent with viral infection  Cardiovascular: Normal S1, S2, regular rate and rhythm, no murmur, rub or gallop  Abdomen: Normal sounds present, soft, lax with no tenderness, no hepatosplenomegaly, and no  masses  Extremities: No edema. Pulses are equally present.   Skin: intact, no rashes   Neurologic: Alert and oriented x 3, No focal deficit         Current Medications:   Scheduled medications  Scheduled Medications[1]  Continuous medications  Continuous Medications[2]  PRN medications  PRN Medications[3]      Investigations:  Labs, radiological imaging and cardiac work up were personally reviewed    Results from last 7 days   Lab Units 04/17/25  0722 04/16/25  1212   SODIUM mmol/L 135* 136   POTASSIUM mmol/L 3.8 3.5   CHLORIDE mmol/L 100 99   CO2 mmol/L 27 29   BUN mg/dL 28* 19   CREATININE mg/dL 0.94 0.97   GLUCOSE mg/dL 290* 155*   CALCIUM mg/dL 9.1 9.4     Results from last 7 days   Lab Units 04/16/25  1212   WBC AUTO x10*3/uL 4.8   HEMOGLOBIN g/dL 12.2   HEMATOCRIT % 40.3   PLATELETS AUTO x10*3/uL 242         Imaging  XR chest 2 views  Result Date: 4/16/2025  Mild vascular congestion. Signed by Alex Prado MD      Cardiology, Vascular, and Other Imaging  No other imaging results found for the past 7 days      Assessment  Genet Wilks IS 75 y.o. female  on day  2 of admission presenting with COPD (chronic obstructive pulmonary disease) (Multi). Actively being treated for the  following medical Problems:    Acute on chronic hypoxic respiratory failure  Acute exacerbation of COPD    Recommendation:  Still have significant wheezing or rhonchi   patient chest x-ray showed chronic infiltrate, no acute findings to indicate active pneumonia  Symptoms most consistent with COPD exacerbation in the context of a viral syndrome  Agree with IV Rocephin/azithromycin  Oxygen for saturation of 89 to 94%  Incentive spirometry  Bronchodilators therapy as needed  PT/OT  DVT prophylaxis  Minimize benzodiazepine and narcotics  Encourage ambulation  Head evaluation and aspiration precautions.        Ava León MD  Pulmonary critical care consultant  04/19/25  10:38 AM       STAFF PHYSICIAN NOTE OF PERSONAL INVOLVEMENT IN  CARE  As the attending physician, I certify that I personally reviewed the patient's history and personally examined the patient to confirm the physical findings described above, and that I reviewed the relevant imaging studies and available reports.  I also discussed the differential diagnosis and all of the proposed management plans with the patient and individuals accompanying the patient to this visit.  They had the opportunity to ask questions about the proposed management plans and to have those questions answered.           [1] acetylcysteine, 6 mL, nebulization, q8h  amLODIPine, 5 mg, oral, Daily  aspirin, 81 mg, oral, Daily before lunch  atorvastatin, 20 mg, oral, Nightly  azithromycin, 500 mg, intravenous, q24h  cholecalciferol, 50 mcg, oral, Daily  ferrous sulfate, 65 mg of iron, oral, Daily with breakfast  furosemide, 20 mg, intravenous, BID  insulin lispro, 0-10 Units, subcutaneous, Before meals & nightly  ipratropium-albuteroL, 3 mL, nebulization, q8h  levothyroxine, 75 mcg, oral, Daily before breakfast  methylPREDNISolone sodium succinate (PF), 40 mg, intravenous, q8h KIMBER  metoprolol succinate XL, 25 mg, oral, Daily  warfarin, 2.5 mg, oral, Once per day on Sunday Tuesday Wednesday Thursday Friday Saturday  [START ON 4/21/2025] warfarin, 5 mg, oral, Every Monday     [2]    [3] PRN medications: acetaminophen, albuterol, benzonatate, dextrose, dextrose, glucagon, glucagon, guaiFENesin, melatonin, ondansetron, oxygen

## 2025-04-20 VITALS
RESPIRATION RATE: 18 BRPM | TEMPERATURE: 96.4 F | HEART RATE: 71 BPM | BODY MASS INDEX: 38.64 KG/M2 | DIASTOLIC BLOOD PRESSURE: 68 MMHG | HEIGHT: 62 IN | WEIGHT: 210 LBS | SYSTOLIC BLOOD PRESSURE: 152 MMHG | OXYGEN SATURATION: 96 %

## 2025-04-20 LAB
ANION GAP SERPL CALC-SCNC: 12 MMOL/L (ref 10–20)
BUN SERPL-MCNC: 42 MG/DL (ref 6–23)
CALCIUM SERPL-MCNC: 9.3 MG/DL (ref 8.6–10.3)
CHLORIDE SERPL-SCNC: 100 MMOL/L (ref 98–107)
CO2 SERPL-SCNC: 32 MMOL/L (ref 21–32)
CREAT SERPL-MCNC: 1.05 MG/DL (ref 0.5–1.05)
EGFRCR SERPLBLD CKD-EPI 2021: 56 ML/MIN/1.73M*2
GLUCOSE BLD MANUAL STRIP-MCNC: 242 MG/DL (ref 74–99)
GLUCOSE BLD MANUAL STRIP-MCNC: 303 MG/DL (ref 74–99)
GLUCOSE BLD MANUAL STRIP-MCNC: 336 MG/DL (ref 74–99)
GLUCOSE BLD MANUAL STRIP-MCNC: 379 MG/DL (ref 74–99)
GLUCOSE SERPL-MCNC: 332 MG/DL (ref 74–99)
HOLD SPECIMEN: NORMAL
INR PPP: 2.7 (ref 0.9–1.1)
MAGNESIUM SERPL-MCNC: 2.24 MG/DL (ref 1.6–2.4)
POTASSIUM SERPL-SCNC: 4.1 MMOL/L (ref 3.5–5.3)
PROTHROMBIN TIME: 30.1 SECONDS (ref 9.8–12.4)
SODIUM SERPL-SCNC: 140 MMOL/L (ref 136–145)

## 2025-04-20 PROCEDURE — 82947 ASSAY GLUCOSE BLOOD QUANT: CPT

## 2025-04-20 PROCEDURE — 2500000001 HC RX 250 WO HCPCS SELF ADMINISTERED DRUGS (ALT 637 FOR MEDICARE OP)

## 2025-04-20 PROCEDURE — 83735 ASSAY OF MAGNESIUM: CPT | Performed by: STUDENT IN AN ORGANIZED HEALTH CARE EDUCATION/TRAINING PROGRAM

## 2025-04-20 PROCEDURE — 1100000001 HC PRIVATE ROOM DAILY

## 2025-04-20 PROCEDURE — 2500000004 HC RX 250 GENERAL PHARMACY W/ HCPCS (ALT 636 FOR OP/ED): Mod: JZ

## 2025-04-20 PROCEDURE — 36415 COLL VENOUS BLD VENIPUNCTURE: CPT | Performed by: STUDENT IN AN ORGANIZED HEALTH CARE EDUCATION/TRAINING PROGRAM

## 2025-04-20 PROCEDURE — 2500000002 HC RX 250 W HCPCS SELF ADMINISTERED DRUGS (ALT 637 FOR MEDICARE OP, ALT 636 FOR OP/ED): Mod: JZ | Performed by: STUDENT IN AN ORGANIZED HEALTH CARE EDUCATION/TRAINING PROGRAM

## 2025-04-20 PROCEDURE — 2500000002 HC RX 250 W HCPCS SELF ADMINISTERED DRUGS (ALT 637 FOR MEDICARE OP, ALT 636 FOR OP/ED): Performed by: NURSE PRACTITIONER

## 2025-04-20 PROCEDURE — 2500000002 HC RX 250 W HCPCS SELF ADMINISTERED DRUGS (ALT 637 FOR MEDICARE OP, ALT 636 FOR OP/ED)

## 2025-04-20 PROCEDURE — 2500000004 HC RX 250 GENERAL PHARMACY W/ HCPCS (ALT 636 FOR OP/ED): Performed by: STUDENT IN AN ORGANIZED HEALTH CARE EDUCATION/TRAINING PROGRAM

## 2025-04-20 PROCEDURE — 80048 BASIC METABOLIC PNL TOTAL CA: CPT | Performed by: STUDENT IN AN ORGANIZED HEALTH CARE EDUCATION/TRAINING PROGRAM

## 2025-04-20 PROCEDURE — 94669 MECHANICAL CHEST WALL OSCILL: CPT

## 2025-04-20 PROCEDURE — 94640 AIRWAY INHALATION TREATMENT: CPT

## 2025-04-20 PROCEDURE — 99233 SBSQ HOSP IP/OBS HIGH 50: CPT | Performed by: STUDENT IN AN ORGANIZED HEALTH CARE EDUCATION/TRAINING PROGRAM

## 2025-04-20 PROCEDURE — 85610 PROTHROMBIN TIME: CPT | Performed by: STUDENT IN AN ORGANIZED HEALTH CARE EDUCATION/TRAINING PROGRAM

## 2025-04-20 RX ADMIN — INSULIN LISPRO 10 UNITS: 100 INJECTION, SOLUTION INTRAVENOUS; SUBCUTANEOUS at 21:36

## 2025-04-20 RX ADMIN — ACETYLCYSTEINE 6 ML: 100 SOLUTION ORAL; RESPIRATORY (INHALATION) at 07:08

## 2025-04-20 RX ADMIN — ASPIRIN 81 MG: 81 TABLET, COATED ORAL at 12:51

## 2025-04-20 RX ADMIN — ATORVASTATIN CALCIUM 20 MG: 20 TABLET, FILM COATED ORAL at 21:36

## 2025-04-20 RX ADMIN — INSULIN LISPRO 4 UNITS: 100 INJECTION, SOLUTION INTRAVENOUS; SUBCUTANEOUS at 18:20

## 2025-04-20 RX ADMIN — IPRATROPIUM BROMIDE AND ALBUTEROL SULFATE 3 ML: .5; 3 SOLUTION RESPIRATORY (INHALATION) at 07:08

## 2025-04-20 RX ADMIN — FERROUS SULFATE TAB 325 MG (65 MG ELEMENTAL FE) 325 MG: 325 (65 FE) TAB at 08:32

## 2025-04-20 RX ADMIN — METOPROLOL SUCCINATE 25 MG: 25 TABLET, EXTENDED RELEASE ORAL at 08:32

## 2025-04-20 RX ADMIN — INSULIN LISPRO 8 UNITS: 100 INJECTION, SOLUTION INTRAVENOUS; SUBCUTANEOUS at 08:38

## 2025-04-20 RX ADMIN — FUROSEMIDE 20 MG: 10 INJECTION, SOLUTION INTRAMUSCULAR; INTRAVENOUS at 08:38

## 2025-04-20 RX ADMIN — METHYLPREDNISOLONE SODIUM SUCCINATE 40 MG: 40 INJECTION, POWDER, FOR SOLUTION INTRAMUSCULAR; INTRAVENOUS at 06:21

## 2025-04-20 RX ADMIN — LEVOTHYROXINE SODIUM 75 MCG: 0.07 TABLET ORAL at 06:21

## 2025-04-20 RX ADMIN — INSULIN LISPRO 8 UNITS: 100 INJECTION, SOLUTION INTRAVENOUS; SUBCUTANEOUS at 12:51

## 2025-04-20 RX ADMIN — METHYLPREDNISOLONE SODIUM SUCCINATE 40 MG: 40 INJECTION, POWDER, FOR SOLUTION INTRAMUSCULAR; INTRAVENOUS at 21:36

## 2025-04-20 RX ADMIN — Medication 50 MCG: at 08:32

## 2025-04-20 RX ADMIN — ACETYLCYSTEINE 6 ML: 100 SOLUTION ORAL; RESPIRATORY (INHALATION) at 23:08

## 2025-04-20 RX ADMIN — WARFARIN SODIUM 2.5 MG: 2.5 TABLET ORAL at 17:50

## 2025-04-20 RX ADMIN — IPRATROPIUM BROMIDE AND ALBUTEROL SULFATE 3 ML: .5; 3 SOLUTION RESPIRATORY (INHALATION) at 15:38

## 2025-04-20 RX ADMIN — IPRATROPIUM BROMIDE AND ALBUTEROL SULFATE 3 ML: .5; 3 SOLUTION RESPIRATORY (INHALATION) at 23:07

## 2025-04-20 RX ADMIN — AMLODIPINE BESYLATE 5 MG: 5 TABLET ORAL at 08:32

## 2025-04-20 RX ADMIN — FUROSEMIDE 20 MG: 10 INJECTION, SOLUTION INTRAMUSCULAR; INTRAVENOUS at 21:36

## 2025-04-20 RX ADMIN — ACETYLCYSTEINE 6 ML: 100 SOLUTION ORAL; RESPIRATORY (INHALATION) at 15:38

## 2025-04-20 RX ADMIN — METHYLPREDNISOLONE SODIUM SUCCINATE 40 MG: 40 INJECTION, POWDER, FOR SOLUTION INTRAMUSCULAR; INTRAVENOUS at 15:11

## 2025-04-20 ASSESSMENT — COGNITIVE AND FUNCTIONAL STATUS - GENERAL
DAILY ACTIVITIY SCORE: 24
STANDING UP FROM CHAIR USING ARMS: A LITTLE
CLIMB 3 TO 5 STEPS WITH RAILING: A LOT
MOBILITY SCORE: 21
STANDING UP FROM CHAIR USING ARMS: A LITTLE
CLIMB 3 TO 5 STEPS WITH RAILING: A LOT
DAILY ACTIVITIY SCORE: 24
MOBILITY SCORE: 21

## 2025-04-20 ASSESSMENT — PAIN SCALES - GENERAL
PAINLEVEL_OUTOF10: 0 - NO PAIN
PAINLEVEL_OUTOF10: 0 - NO PAIN

## 2025-04-20 ASSESSMENT — PAIN - FUNCTIONAL ASSESSMENT: PAIN_FUNCTIONAL_ASSESSMENT: 0-10

## 2025-04-20 NOTE — PROGRESS NOTES
Pulmonary Critical Care note    Patient Name :Genet Wilks  Date of service : 04/20/25  MRN: [id number]  YOB: 1950      Interval History:  4/20/2025:  Patient is currently on low-flow oxygen improving wheezing, no fever no chills, asking about going home  4/19/2025  Still room air to low-flow oxygen but still have significant wheezing and rhonchi  4/18/2025  Patient reports improve her shortness of breath, less dyspneic, no fever no chills.  History of Present Illness:      75 y.o. female  on day  3 of admission presenting with COPD (chronic obstructive pulmonary disease) (Multi).  Genet Wilks is a 75-year-old female who presents to the ED with shortness of breath. Patient has a past medical history of asthma, COPD wears at bedtime O2 and PRN, MI, atrial valve replacement, A-fib on Coumadin, CAD, heart failure, DM, HLD, and hypothyroidism. Patient reports on April 11/25 she began noticing increased shortness of breath. On Saturday morning she had to start wearing her oxygen during the day which she only typically wears at nighttime, she later had to increase from 2 LNC to 3 LNC. Patient did not notice much improvement and called her doctor to make an appointment to be seen but he was unable to get her in. Her PCP ordered an antibiotic for her which she was taking at home. Patient began to have diarrhea that was watery but nonbloody on Monday and Tuesday of this week. Patient also had 1 episode of nonbloody emesis. Patient denies any nausea since just a decreased appetite. Patient reports she has been drinking fluids. Patient denies any increase in swelling. Patient denies any fevers at home. Patient does report having a worsening moist cough with a thick sputum production. Patient decided come to the ER today as the shortness of breath has been increasingly getting worse. Patient denies any chest pain, syncopal episodes, urinary symptoms weakness, or new open skin sores.   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, Personal history of other diseases of the circulatory system, Personal history of other diseases of the circulatory system, Personal history of other diseases of the circulatory system, Personal history of other diseases of the musculoskeletal system and connective tissue, Personal history of other diseases of the nervous system and sense organs, Personal history of other diseases of the respiratory system, Personal history of other endocrine, nutritional and metabolic disease, Personal history of other endocrine, nutritional and metabolic disease, Personal history of other endocrine, nutritional and metabolic disease, Personal history of other specified conditions, Presence of other heart-valve replacement, and Presence of prosthetic heart valve (06/02/2022).   has a past surgical history that includes Other surgical history (11/13/2014); Cardiac surgery (11/13/2014); Tubal ligation (11/13/2014); Other surgical history (11/13/2014); Tonsillectomy (11/13/2014); Knee surgery (06/15/2018); and Cardiac catheterization (06/15/2018).   reports that she has never smoked. She has never been exposed to tobacco smoke. She has never used smokeless tobacco. She reports that she does not currently use alcohol. She reports that she does not use drugs.  Family History:   Family medical history includes stroke in father.    Allergies:     Penicillins, Prednisone, Mushroom, and Simvastatin      Social history:   reports that she has never smoked. She has never been exposed to tobacco smoke. She has never used smokeless tobacco. She reports that she does not currently use alcohol. She reports that she does not use drugs.      Review of Systems:   General: denies weight gain, denies loss of appetite,  fever, chills, night sweats.  HEENT: denies headaches, dizziness, head trauma, visual changes, eye pain, tinnitus, nosebleeds, hoarseness or throat pain    Respiratory: denies chest pain, dyspnea, cough and hemoptysis  Cardiovascular: denies orthopnea, paroxysmal nocturnal dyspnea, leg swelling, and previous heart attack.    Gastrointestinal: denies pain, nausea vomiting, diarrhea, constipation, melena or bleeding.  Genitourinary: denies hematuria, frequency, urgency or dysuria  Neurology: denies syncope, seizures, paralysis, paraesthesia   Endocrine: denies polyuria, polydipsia, skin or hair changes, and heat or cold intolerance  Musculoskeletal: denies joint pain, swelling, arthritis or myalgia  Hematologic: denies bleeding, adenopathy and easy bruising  Skin: denies rashes and skin discoloration  Psychiatry: denies depression    Physical Exam:   Vital Signs:   Vitals:    04/20/25 1010   BP: 163/69   Pulse: 74   Resp:    Temp: 35.3 °C (95.5 °F)   SpO2: 98%     Vitals:    04/16/25 1107   Weight: 95.3 kg (210 lb)         Input/Output:    Intake/Output Summary (Last 24 hours) at 4/20/2025 1036  Last data filed at 4/20/2025 0900  Gross per 24 hour   Intake 842 ml   Output --   Net 842 ml       Oxygen requirements:    Ventilator Information:  FiO2 (%):  [28 %] 28 %  Oxygen Therapy/Pulse Ox  Medical Gas Therapy: Supplemental oxygen  Medical Gas Delivery Method: Nasal cannula (2 lpm)  FiO2 (%): 28 %  SpO2: 98 %  Patient Activity During SpO2 Measurement: At rest  Temp: 35.3 °C (95.5 °F)        General appearance: Not in pain or distress, in no respiratory distress    HEENT: Atraumatic/normocephalic, EOMI, YVETTE, pharynx clear, moist mucosa, redness of the uvula appreciated,   Neck: Supple, no jugular venous distension, lymphadenopathy, thyromegaly or carotid bruits  Chest: Diffuse wheezing or rhonchi hopefully with more mobility agree with the current management of IV Solu-Medrol  P.o. azithromycin  Monitor diarrhea,  syndrome is most consistent with viral infection  Cardiovascular: Normal S1, S2, regular rate and rhythm, no murmur, rub or gallop  Abdomen: Normal sounds present, soft, lax with no tenderness, no hepatosplenomegaly, and no masses  Extremities: No edema. Pulses are equally present.   Skin: intact, no rashes   Neurologic: Alert and oriented x 3, No focal deficit         Current Medications:   Scheduled medications  Scheduled Medications[1]  Continuous medications  Continuous Medications[2]  PRN medications  PRN Medications[3]      Investigations:  Labs, radiological imaging and cardiac work up were personally reviewed    Results from last 7 days   Lab Units 04/20/25  0718 04/17/25  0722 04/16/25  1212   SODIUM mmol/L 140 135* 136   POTASSIUM mmol/L 4.1 3.8 3.5   CHLORIDE mmol/L 100 100 99   CO2 mmol/L 32 27 29   BUN mg/dL 42* 28* 19   CREATININE mg/dL 1.05 0.94 0.97   GLUCOSE mg/dL 332* 290* 155*   CALCIUM mg/dL 9.3 9.1 9.4     Results from last 7 days   Lab Units 04/16/25  1212   WBC AUTO x10*3/uL 4.8   HEMOGLOBIN g/dL 12.2   HEMATOCRIT % 40.3   PLATELETS AUTO x10*3/uL 242         Imaging  XR chest 2 views  Result Date: 4/16/2025  Mild vascular congestion. Signed by Alex Prado MD      Cardiology, Vascular, and Other Imaging  No other imaging results found for the past 7 days      Assessment  Genet Wilks IS 75 y.o. female  on day  3 of admission presenting with COPD (chronic obstructive pulmonary disease) (Multi). Actively being treated for the  following medical Problems:    Acute on chronic hypoxic respiratory failure  Acute exacerbation of COPD    Recommendation:  Improved wheezing and rhonchi  Home O2 assessment, possible discharge soon  patient chest x-ray showed chronic infiltrate, no acute findings to indicate active pneumonia  Symptoms most consistent with COPD exacerbation in the context of a viral syndrome  Agree with IV Rocephin/azithromycin  Oxygen for saturation of 89 to 94%  Incentive  spirometry  Bronchodilators therapy as needed  PT/OT  DVT prophylaxis  Minimize benzodiazepine and narcotics  Encourage ambulation  Head evaluation and aspiration precautions.        Ava León MD  Pulmonary critical care consultant  04/20/25  10:36 AM       STAFF PHYSICIAN NOTE OF PERSONAL INVOLVEMENT IN CARE  As the attending physician, I certify that I personally reviewed the patient's history and personally examined the patient to confirm the physical findings described above, and that I reviewed the relevant imaging studies and available reports.  I also discussed the differential diagnosis and all of the proposed management plans with the patient and individuals accompanying the patient to this visit.  They had the opportunity to ask questions about the proposed management plans and to have those questions answered.           [1] acetylcysteine, 6 mL, nebulization, q8h  amLODIPine, 5 mg, oral, Daily  aspirin, 81 mg, oral, Daily before lunch  atorvastatin, 20 mg, oral, Nightly  cholecalciferol, 50 mcg, oral, Daily  ferrous sulfate, 65 mg of iron, oral, Daily with breakfast  furosemide, 20 mg, intravenous, BID  insulin lispro, 0-10 Units, subcutaneous, Before meals & nightly  ipratropium-albuteroL, 3 mL, nebulization, q8h  levothyroxine, 75 mcg, oral, Daily before breakfast  methylPREDNISolone sodium succinate (PF), 40 mg, intravenous, q8h KIMBER  metoprolol succinate XL, 25 mg, oral, Daily  warfarin, 2.5 mg, oral, Once per day on Sunday Tuesday Wednesday Thursday Friday Saturday  [START ON 4/21/2025] warfarin, 5 mg, oral, Every Monday     [2]    [3] PRN medications: acetaminophen, albuterol, benzonatate, dextrose, dextrose, glucagon, glucagon, guaiFENesin, melatonin, ondansetron, oxygen

## 2025-04-20 NOTE — PROGRESS NOTES
Genet Wilks is a 75 y.o. female on day 3 of admission presenting with COPD (chronic obstructive pulmonary disease) (Multi).      Subjective   Patient fully evaluated  04/20  for    Problem List Items Addressed This Visit       COPD exacerbation (Multi) - Primary     Other Visit Diagnoses         Subtherapeutic anticoagulation              Patient seen resting in bed with head of bed elevated, no s/s or c/o acute difficulties at this time.  Vital signs for last 24 hours Temp:  [35.5 °C (95.9 °F)-35.9 °C (96.6 °F)] 35.5 °C (95.9 °F)  Heart Rate:  [58-76] 72  Resp:  [16-18] 18  BP: (144-165)/(63-73) 144/65  FiO2 (%):  [21 %-28 %] 28 %    No intake/output data recorded.  Patient still requiring frequent cardiac and SPO2 monitoring. Continue aggressive pulmonary hygiene and oral hygiene. Off loading as tolerated for skin integrity. Medications and labs reviewed-   Results for orders placed or performed during the hospital encounter of 04/16/25 (from the past 24 hours)   POCT GLUCOSE   Result Value Ref Range    POCT Glucose 294 (H) 74 - 99 mg/dL   POCT GLUCOSE   Result Value Ref Range    POCT Glucose 398 (H) 74 - 99 mg/dL   POCT GLUCOSE   Result Value Ref Range    POCT Glucose 358 (H) 74 - 99 mg/dL   POCT GLUCOSE   Result Value Ref Range    POCT Glucose 381 (H) 74 - 99 mg/dL      Patient recently received an antibiotic (last 12 hours)       None           Plan discussed with interdisciplinary team, patient still with respiratory concerns, patient with slow but progressive improvements, will continue aggressive pulmonary treatments, incentive spirometry, chest physiotherapy added, Will continue current and repeat labs in the AM.     Discharge planning discussed with patient and care team. Therapy evaluations ordered. [facility]- [facility] , anticipate HHC at discharge. Patient aware and agreeable to current plan, continue plan as above.     I spent a total of 50 minutes on the date of the service which included preparing to see the  patient, face-to-face patient care, completing clinical documentation, obtaining and/or reviewing separately obtained history, performing a medically appropriate examination, counseling and educating the patient/family/caregiver, ordering medications, tests, or procedures, communicating with other HCPs (not separately reported), independently interpreting results (not separately reported), communicating results to the patient/family/caregiver, and care coordination (not separately reported).        Objective     Last Recorded Vitals  /65   Pulse 72   Temp 35.5 °C (95.9 °F)   Resp 18   Wt 95.3 kg (210 lb)   SpO2 98%   Intake/Output last 3 Shifts:    Intake/Output Summary (Last 24 hours) at 4/20/2025 0242  Last data filed at 4/19/2025 1117  Gross per 24 hour   Intake 250 ml   Output --   Net 250 ml       Admission Weight  Weight: 95.3 kg (210 lb) (04/16/25 1107)    Daily Weight  04/16/25 : 95.3 kg (210 lb)    Image Results  XR chest 2 views  Narrative: STUDY:  Chest Radiographs;  04/16/2025 at 12:02 hours.  INDICATION:  Shortness of breath.  COMPARISON:  XR Chest 01/06/2025 at 11:06 hours.  ACCESSION NUMBER(S):  [id number]  ORDERING CLINICIAN:  ALEX MOREL  TECHNIQUE:  Frontal and lateral chest.   FINDINGS:  CARDIOMEDIASTINAL SILHOUETTE:  Heart is borderline enlarged with evidence of prior median sternotomy.   Mild increased pulmonary vascularity     LUNGS:  Faint hazy opacities in the inferior lung bases similar compared to  prior.     ABDOMEN:  No remarkable upper abdominal findings.     BONES:  No acute osseous changes.  Impression: Mild vascular congestion.  Signed by Alex Prado MD      Physical Exam  Vitals and nursing note reviewed.         Relevant Results                              Assessment & Plan  COPD (chronic obstructive pulmonary disease) (Multi)    CHF exacerbation (Multi)    Bilateral pneumonia    Diarrhea    COPD exacerbation (Multi)            Signed         History Of  Present Illness  Genet Wilks is a 75-year-old female who presents to the ED with shortness of breath.  Patient has a past medical history of asthma, COPD wears at bedtime O2 and PRN, MI, atrial valve replacement, A-fib on Coumadin, CAD, heart failure, DM, HLD, and hypothyroidism.  Patient reports on April 11/25 she began noticing increased shortness of breath.  On Saturday morning she had to start wearing her oxygen during the day which she only typically wears at nighttime, she later had to increase from 2 LNC to 3 LNC.  Patient did not notice much improvement and called her doctor to make an appointment to be seen but he was unable to get her in.  Her PCP ordered an antibiotic for her which she was taking at home.  Patient began to have diarrhea that was watery but nonbloody on Monday and Tuesday of this week.  Patient also had 1 episode of nonbloody emesis.  Patient denies any nausea since just a decreased appetite.  Patient reports she has been drinking fluids.  Patient denies any increase in swelling.  Patient denies any fevers at home.  Patient does report having a worsening moist cough with a thick sputum production.  Patient decided come to the ER today as the shortness of breath has been increasingly getting worse.  Patient denies any chest pain, syncopal episodes, urinary symptoms weakness, or new open skin sores.     ED Course      Hemodynamically Stable.    Vitals: Temp. 36.1, HR 79, Resp.  16, /62, Pulse ox 93% RA       Labs: Glucose 155, Na+ 136, K+ 3.5, Bun 19, Creat. 0.97, GFR 62, Ca 9.4, Albumin 4.3, Alk Phos. 71, ALT 11, AST 20, , troponin 24 then 19, Mg+ 1.86, WBC 4.8,  Hgb.  12.2, Hct.  40.3,  INR 1.7,  Medications: Albuterol, azithromycin, Lasix, DuoNeb, Solu-Medrol      Imaging: interpreted by radiologist.  Chest x-ray: Mild vascular congestion.  Faint hazy opacities in the inferior lung bases similar compared to prior.  EKG: Not available for my review.       Patient was 93% RA  in ED placed on nasal cannula in ED now 99%.     Past Medical History  [Medical History]    [Medical History]  Past Medical History       Diagnosis Date    CHF (congestive heart failure)      Long term (current) use of anticoagulants       Long-term (current) use of anticoagulants    Old myocardial infarction       History of non-ST elevation myocardial infarction (NSTEMI)    Personal history of (corrected) congenital malformations of heart and circulatory system       History of congenital anomaly of heart    Personal history of other diseases of the circulatory system       History of rheumatic fever    Personal history of other diseases of the circulatory system       History of mitral valve prolapse    Personal history of other diseases of the circulatory system       History of atrial fibrillation    Personal history of other diseases of the musculoskeletal system and connective tissue       History of back pain    Personal history of other diseases of the nervous system and sense organs       History of carpal tunnel syndrome    Personal history of other diseases of the respiratory system       History of asthma    Personal history of other endocrine, nutritional and metabolic disease       History of morbid obesity    Personal history of other endocrine, nutritional and metabolic disease       History of hypothyroidism    Personal history of other endocrine, nutritional and metabolic disease       History of hypercholesterolemia    Personal history of other specified conditions       History of chest pain    Presence of other heart-valve replacement       Heart valve replaced by other means    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     Mechanical heart valve present        Surgical History  [Surgical History]    [Surgical History]  Past Surgical History        Procedure Laterality Date    CARDIAC CATHETERIZATION   06/15/2018     Cardiac Cath Procedure Outcome:    CARDIAC SURGERY   11/13/2014     Heart  Surgery    KNEE SURGERY   06/15/2018     Knee Surgery    OTHER SURGICAL HISTORY   11/13/2014     Surgery    OTHER SURGICAL HISTORY   11/13/2014     Colposcopy Cervix With Biopsy(S)    TONSILLECTOMY   11/13/2014     Tonsillectomy    TUBAL LIGATION   11/13/2014     Tubal Ligation        Social History  She reports that she has never smoked. She has never been exposed to tobacco smoke. She has never used smokeless tobacco. She reports that she does not currently use alcohol. She reports that she does not use drugs.  Patient currently lives at home with her son, daughter-in-law, and grandson.  Patient does not use any assistive devices for ambulation.     Family History  [Family History]    [Family History]         Problem Relation Name Age of Onset    Diabetes Mother        Stroke Father        Hypertension Father        Diabetes Sister        Breast cancer Sister        Throat cancer Brother        Diabetes Brother        Other (S/P CABG x3) Brother        Cancer Other sibling          Allergies  Penicillins, Prednisone, Mushroom, and Simvastatin     Review of Systems      10 point ROS systems completed and is negative except for what is stated in HPI.      Physical Exam  Constitutional:       General: She is awake. She is not in acute distress.     Appearance: Normal appearance. She is obese. She is ill-appearing. She is not toxic-appearing.      Interventions: Nasal cannula in place.   HENT:      Head: Normocephalic and atraumatic.      Nose: Nose normal. No rhinorrhea.      Mouth/Throat:      Mouth: Mucous membranes are dry.   Eyes:      General:         Right eye: No discharge.         Left eye: No discharge.      Conjunctiva/sclera: Conjunctivae normal.      Pupils: Pupils are equal, round, and reactive to light.   Cardiovascular:      Rate and Rhythm: Normal rate and regular rhythm.      Pulses: Normal pulses.      Comments: Heart valve clicking heard on auscultation  Pulmonary:      Effort: Pulmonary effort is  "normal. No respiratory distress.      Breath sounds: Examination of the right-upper field reveals rhonchi. Examination of the left-upper field reveals rhonchi. Examination of the right-middle field reveals rhonchi. Examination of the left-middle field reveals rhonchi. Rhonchi present. No wheezing.   Abdominal:      General: Bowel sounds are normal. There is no distension.      Palpations: Abdomen is soft.      Tenderness: There is no abdominal tenderness. There is no guarding.   Musculoskeletal:         General: Normal range of motion.      Cervical back: Normal range of motion and neck supple.   Skin:     General: Skin is warm and dry.   Neurological:      General: No focal deficit present.      Mental Status: She is alert and oriented to person, place, and time. Mental status is at baseline.      Motor: No weakness.   Psychiatric:         Attention and Perception: Attention normal.         Mood and Affect: Mood normal.         Behavior: Behavior normal. Behavior is cooperative.            Last Recorded Vitals  Blood pressure 130/60, pulse 81, temperature 36.1 °C (97 °F), temperature source Temporal, resp. rate 18, height 1.575 m (5' 2\"), weight 95.3 kg (210 lb), SpO2 98%.     Relevant Results        Results for orders placed or performed during the hospital encounter of 04/16/25 (from the past 24 hours)   Comprehensive metabolic panel   Result Value Ref Range     Glucose 155 (H) 74 - 99 mg/dL     Sodium 136 136 - 145 mmol/L     Potassium 3.5 3.5 - 5.3 mmol/L     Chloride 99 98 - 107 mmol/L     Bicarbonate 29 21 - 32 mmol/L     Anion Gap 12 10 - 20 mmol/L     Urea Nitrogen 19 6 - 23 mg/dL     Creatinine 0.97 0.50 - 1.05 mg/dL     eGFR 62 >60 mL/min/1.73m*2     Calcium 9.4 8.6 - 10.3 mg/dL     Albumin 4.3 3.4 - 5.0 g/dL     Alkaline Phosphatase 71 33 - 136 U/L     Total Protein 8.1 6.4 - 8.2 g/dL     AST 20 9 - 39 U/L     Bilirubin, Total 1.5 (H) 0.0 - 1.2 mg/dL     ALT 11 7 - 45 U/L   Magnesium   Result Value Ref " Range     Magnesium 1.86 1.60 - 2.40 mg/dL   CBC and Auto Differential   Result Value Ref Range     WBC 4.8 4.4 - 11.3 x10*3/uL     nRBC 0.0 0.0 - 0.0 /100 WBCs     RBC 4.56 4.00 - 5.20 x10*6/uL     Hemoglobin 12.2 12.0 - 16.0 g/dL     Hematocrit 40.3 36.0 - 46.0 %     MCV 88 80 - 100 fL     MCH 26.8 26.0 - 34.0 pg     MCHC 30.3 (L) 32.0 - 36.0 g/dL     RDW 15.2 (H) 11.5 - 14.5 %     Platelets 242 150 - 450 x10*3/uL     Neutrophils % 58.8 40.0 - 80.0 %     Immature Granulocytes %, Automated 0.4 0.0 - 0.9 %     Lymphocytes % 22.4 13.0 - 44.0 %     Monocytes % 14.9 2.0 - 10.0 %     Eosinophils % 2.7 0.0 - 6.0 %     Basophils % 0.8 0.0 - 2.0 %     Neutrophils Absolute 2.80 1.60 - 5.50 x10*3/uL     Immature Granulocytes Absolute, Automated 0.02 0.00 - 0.50 x10*3/uL     Lymphocytes Absolute 1.07 0.80 - 3.00 x10*3/uL     Monocytes Absolute 0.71 0.05 - 0.80 x10*3/uL     Eosinophils Absolute 0.13 0.00 - 0.40 x10*3/uL     Basophils Absolute 0.04 0.00 - 0.10 x10*3/uL   B-Type Natriuretic Peptide   Result Value Ref Range      (H) 0 - 99 pg/mL   Troponin I, High Sensitivity, Initial   Result Value Ref Range     Troponin I, High Sensitivity 24 (H) 0 - 13 ng/L   Protime-INR   Result Value Ref Range     Protime 18.6 (H) 9.8 - 12.4 seconds     INR 1.7 (H) 0.9 - 1.1   Troponin, High Sensitivity, 1 Hour   Result Value Ref Range     Troponin I, High Sensitivity 19 (H) 0 - 13 ng/L   Sars-CoV-2, Influenza A/B and RSV PCR   Result Value Ref Range     Coronavirus 2019, PCR Not Detected Not Detected     Flu A Result Not Detected Not Detected     Flu B Result Not Detected Not Detected     RSV PCR Not Detected Not Detected   POCT GLUCOSE   Result Value Ref Range     POCT Glucose 379 (H) 74 - 99 mg/dL   POCT GLUCOSE   Result Value Ref Range     POCT Glucose 324 (H) 74 - 99 mg/dL   POCT GLUCOSE   Result Value Ref Range     POCT Glucose 286 (H) 74 - 99 mg/dL   Magnesium   Result Value Ref Range     Magnesium 1.95 1.60 - 2.40 mg/dL    Coagulation Screen   Result Value Ref Range     Protime 19.9 (H) 9.8 - 12.4 seconds     INR 1.8 (H) 0.9 - 1.1     aPTT 27 26 - 36 seconds   Comprehensive metabolic panel   Result Value Ref Range     Glucose 290 (H) 74 - 99 mg/dL     Sodium 135 (L) 136 - 145 mmol/L     Potassium 3.8 3.5 - 5.3 mmol/L     Chloride 100 98 - 107 mmol/L     Bicarbonate 27 21 - 32 mmol/L     Anion Gap 12 10 - 20 mmol/L     Urea Nitrogen 28 (H) 6 - 23 mg/dL     Creatinine 0.94 0.50 - 1.05 mg/dL     eGFR 64 >60 mL/min/1.73m*2     Calcium 9.1 8.6 - 10.3 mg/dL     Albumin 3.9 3.4 - 5.0 g/dL     Alkaline Phosphatase 63 33 - 136 U/L     Total Protein 7.5 6.4 - 8.2 g/dL     AST 16 9 - 39 U/L     Bilirubin, Total 1.0 0.0 - 1.2 mg/dL     ALT 10 7 - 45 U/L   Lavender Top   Result Value Ref Range     Extra Tube Hold for add-ons.        *Note: Due to a large number of results and/or encounters for the requested time period, some results have not been displayed. A complete set of results can be found in Results Review.      XR chest 2 views  Result Date: 4/16/2025  STUDY: Chest Radiographs;  04/16/2025 at 12:02 hours. INDICATION: Shortness of breath. COMPARISON: XR Chest 01/06/2025 at 11:06 hours. ACCESSION NUMBER(S): [id number] ORDERING CLINICIAN: ALEX MOREL TECHNIQUE:  Frontal and lateral chest. FINDINGS: CARDIOMEDIASTINAL SILHOUETTE: Heart is borderline enlarged with evidence of prior median sternotomy.  Mild increased pulmonary vascularity  LUNGS: Faint hazy opacities in the inferior lung bases similar compared to prior.  ABDOMEN: No remarkable upper abdominal findings.  BONES: No acute osseous changes.     Mild vascular congestion. Signed by Alex Prado MD           Assessment & Plan  COPD (chronic obstructive pulmonary disease) (Multi)     CHF exacerbation (Multi)     Bilateral pneumonia     Diarrhea     COPD exacerbation (Multi)        Patient arrived to ED today after having symptoms of shortness of breath for 6 days.  Patient  typically only wears oxygen at night for her COPD.  Patient has been wearing oxygen during the day for the past 5 days and has had to increase it to 3 L nasal cannula from 2 L.  Patient called her doctor to get an appointment was able to get in but had an antibiotic ordered which she has been taking without improvement in her symptoms.  Chest x-ray today in ED revealed mild vascular congestion with bilateral faint hazy opacities in the inferior lung bases which is similar to prior chest x-ray in January in which patient had pneumonia.  Patient had mild elevated BNP today.  Pulmonology consult placed and cardiology consult placed, appreciate recs.  Pending patient's INR levels, other antibiotics may need to be considered due to Coumadin.     Patient admitted to Dr. Rod burgos for further medical management.      # Pneumonia  # COPD exacerbation  # Acute hypoxia  # Shortness of breath  # Diarrhea  -Antibiotics initiated in ED, will continue azithromycin (check daily INR)  -Procalcitonin ordered  -Flu/COVID/RSV ordered  -DuoNeb scheduled, albuterol as needed  -Mucinex twice daily  -Tessalon Perles  -Solu-Medrol every 8  -Strep pneumo/Legionella urine  -As needed oxygen >92%  -Cardiac diet   -admitted to observation with telemetry    - q4 vitals    -morning labs, CBC, BMP  - Chest x-ray revealed faint hazy opacities representing pneumonia which were similar on prior x-ray in January 2025.     # CHF exacerbation  # Vascular congestion  # Elevated BNP  # Elevated troponin  # Subtherapeutic INR  - Cardiology consult  - Fluid restriction  - Strict I's and O's  - Daily weight  - Last echo 1/6/2025, EF 55%, left atrium moderately dilated, AVR,  - Lasix given in ED 40 mg, will continue 20 mg twice daily  -PT/OT/SW   -Daily INR, trend trop, mg+        Chronic Conditions   # CAD, HLD  # MI, atrial valve replacement, A-fib, CAD  # DM  -Hold home glycemic medication   -SSI with hypoglycemic protocol   -POCT  # Hypothyroidism  #  Asthma     Continue home medications as ordered   Full Code      #DVT Prophylaxis   Scd's as tolerated   DVT Prophylaxis   On eliquis     Thorough record review performed of previous labs and notes from prior encounters.                                       Madison Field

## 2025-04-20 NOTE — PROGRESS NOTES
Subjective Data:  Cough is better.  Heart rate is down to 70s in atrial fibrillation     Objective Data:  Last Recorded Vitals:  Vitals:    04/19/25 2239 04/20/25 0306 04/20/25 0708 04/20/25 0836   BP:  137/65  135/63   BP Location:       Patient Position:       Pulse:  73     Resp:       Temp:  35.3 °C (95.5 °F)     TempSrc:       SpO2: 98% 99% 98%    Weight:       Height:           Last Labs:  CBC - 4/16/2025: 12:12 PM  4.8 12.2 242    40.3      CMP - 4/20/2025:  7:18 AM  9.3 7.5 16 --- 1.0   _ 3.9 10 63      PTT - 4/17/2025:  7:22 AM  2.7   30.1 27     TROPHS   Date/Time Value Ref Range Status   04/17/2025 07:22 AM 11 0 - 13 ng/L Final   04/16/2025 01:20 PM 19 0 - 13 ng/L Final   04/16/2025 12:12 PM 24 0 - 13 ng/L Final     BNP   Date/Time Value Ref Range Status   04/16/2025 12:12  0 - 99 pg/mL Final   12/31/2024 11:41  0 - 99 pg/mL Final     HGBA1C   Date/Time Value Ref Range Status   02/03/2025 03:45 PM 8.5 4.2 - 6.5 % Final   09/09/2024 04:20 PM 7.6 4.2 - 6.5 % Final   02/02/2023 02:04 PM 6.2 4.2 - 6.5 % Final   05/04/2022 03:50 PM 6.1 4.2 - 6.5 % Final     LDLCALC   Date/Time Value Ref Range Status   09/09/2024 04:30 PM 98 <=99 mg/dL Final     Comment:                                 Near   Borderline      AGE      Desirable  Optimal    High     High     Very High     0-19 Y     0 - 109     ---    110-129   >/= 130     ----    20-24 Y     0 - 119     ---    120-159   >/= 160     ----      >24 Y     0 -  99   100-129  130-159   160-189     >/=190     11/09/2023 03:26 PM 85 <=99 mg/dL Final     Comment:                                 Near   Borderline      AGE      Desirable  Optimal    High     High     Very High     0-19 Y     0 - 109     ---    110-129   >/= 130     ----    20-24 Y     0 - 119     ---    120-159   >/= 160     ----      >24 Y     0 -  99   100-129  130-159   160-189     >/=190       VLDL   Date/Time Value Ref Range Status   09/09/2024 04:30 PM 28 0 - 40 mg/dL Final   11/09/2023  03:26 PM 30 0 - 40 mg/dL Final   05/11/2023 01:57 PM 31 0 - 40 mg/dL Final   02/02/2023 07:44 AM 28 0 - 40 mg/dL Final   05/04/2022 12:00 AM 22 0 - 40 mg/dL Final      Last I/O:  I/O last 3 completed shifts:  In: 250 (2.6 mL/kg) [IV Piggyback:250]  Out: - (0 mL/kg)   Weight: 95.3 kg     Past Cardiology Tests (Last 3 Years):  EKG:  ECG 12 Lead 01/23/2025      Electrocardiogram, 12-lead PRN ACS symptoms 01/03/2025      ECG 12 lead 12/31/2024      ECG 12 lead (Ancillary Performed) 12/17/2024      ECG 12 Lead 09/05/2024      ECG 12 Lead 03/20/2024      ECG 12 Lead 12/11/2023    Echo:  Transthoracic Echo (TTE) Complete 01/06/2025      Transthoracic Echo (TTE) Complete 03/20/2024    Ejection Fractions:  EF   Date/Time Value Ref Range Status   01/06/2025 09:45 AM 55 %    03/20/2024 02:02 PM 63 %      Cath:  No results found for this or any previous visit from the past 1095 days.    Stress Test:  No results found for this or any previous visit from the past 1095 days.    Cardiac Imaging:  No results found for this or any previous visit from the past 1095 days.      Inpatient Medications:  Scheduled Medications[1]  PRN Medications[2]  Continuous Medications[3]    Physical Exam:  General: No acute distress,  A&O x3, supplemental oxygen  Skin: Warm and dry  Neck: JVD is not elevated  ENT: Moist mucous membranes no lesions appreciated  Pulmonary: Rhonchi in anterior lung fields  Cards: Regular rate rhythm, no murmurs gallops or rubs appreciated normal S1-S2  Abdomen: Soft nontender nondistended  Extremities: No edema or cyanosis  Psych: Appropriate mood and affect         Assessment/Plan  1.  Acute on chronic respiratory failure: Appears provoked in setting of upper respiratory infection.  Combination of COPD exacerbation with heart failure exacerbation.  Improving with treatment.  - Continue diuresis with IV Lasix aiming for goal of -1-2 L over next 24 hours  Continue antibiotics and steroids which is managed by primary  team-     2.  History of mitral valve disease status post mechanical mitral valve prosthesis subtherapeutic INR.  Goal INR is between 2.5-3.5 given the presence of prosthetic mitral valve.  Labs today are pending.  - Continue aspirin 81 mg daily     3.  History of CAD/MI: No chest pain.     4.  History of atrial fibrillation: Appears permanent as patient is atrial fibrillation today.  She is on warfarin for CVA prophylaxis.     (This note was generated with voice recognition software and may contain errors including spelling, grammar, syntax and missed recognition of what was dictated, of which may not have been fully corrected)       April 19, 2025  Patient reports that her shortness of breath is better but is still not at her baseline and especially the cough bothers her.  Will continue with current plan for diuresis and we will obtain labs tomorrow morning.  Need to make sure the potassium is above 4 and magnesium above 2.  Will also recheck INR.  Her heart rate and atrial fibrillation were well-controlled.  I will follow-up with her tomorrow.  Plan for possible discharge early next week    April 20, 2025  Renal function is good and electrolytes.  INR is good at 2.7.  Unfortunately does not appear hide good urine output record.  I sent a note to the nurse.  Dr. Plummer will resume seeing care from tomorrow.    Code Status:  Full Code    Luis Chapa MD PhD       [1]   Scheduled medications   Medication Dose Route Frequency    acetylcysteine  6 mL nebulization q8h    amLODIPine  5 mg oral Daily    aspirin  81 mg oral Daily before lunch    atorvastatin  20 mg oral Nightly    cholecalciferol  50 mcg oral Daily    ferrous sulfate  65 mg of iron oral Daily with breakfast    furosemide  20 mg intravenous BID    insulin lispro  0-10 Units subcutaneous Before meals & nightly    ipratropium-albuteroL  3 mL nebulization q8h    levothyroxine  75 mcg oral Daily before breakfast    methylPREDNISolone sodium  succinate (PF)  40 mg intravenous q8h [facility]    metoprolol succinate XL  25 mg oral Daily    warfarin  2.5 mg oral Once per day on Sunday Tuesday Wednesday Thursday Friday Saturday    [START ON 4/21/2025] warfarin  5 mg oral Every Monday   [2]   PRN medications   Medication    acetaminophen    albuterol    benzonatate    dextrose    dextrose    glucagon    glucagon    guaiFENesin    melatonin    ondansetron    oxygen   [3]   Continuous Medications   Medication Dose Last Rate

## 2025-04-20 NOTE — PROGRESS NOTES
TCC Note: Pt possibly discharging home today. No change to discharge plan. Care Transitions will continue to follow. Kylie Rocha, MSN, RN, TCC.

## 2025-04-20 NOTE — CARE PLAN
Problem: Pain - Adult  Goal: Verbalizes/displays adequate comfort level or baseline comfort level  Outcome: Progressing     Problem: Safety - Adult  Goal: Free from fall injury  Outcome: Progressing   The patient's goals for the shift include      The clinical goals for the shift include pt will maintain an Spo2 level above 89% on 2 L throughout the shift    Over the shift, the patient did not make progress toward the following goals. Barriers to progression include . Recommendations to address these barriers include .

## 2025-04-20 NOTE — CARE PLAN
The patient's goals for the shift include      The clinical goals for the shift include patient will maintain an SpO2 level above 89%  on 2L and remain safe during the shift

## 2025-04-21 LAB
ALBUMIN SERPL BCP-MCNC: 3.8 G/DL (ref 3.4–5)
ALP SERPL-CCNC: 72 U/L (ref 33–136)
ALT SERPL W P-5'-P-CCNC: 10 U/L (ref 7–45)
ANION GAP SERPL CALC-SCNC: 9 MMOL/L (ref 10–20)
AST SERPL W P-5'-P-CCNC: 9 U/L (ref 9–39)
BASOPHILS # BLD AUTO: 0.01 X10*3/UL (ref 0–0.1)
BASOPHILS NFR BLD AUTO: 0.1 %
BILIRUB SERPL-MCNC: 0.9 MG/DL (ref 0–1.2)
BUN SERPL-MCNC: 41 MG/DL (ref 6–23)
CALCIUM SERPL-MCNC: 9.1 MG/DL (ref 8.6–10.3)
CHLORIDE SERPL-SCNC: 97 MMOL/L (ref 98–107)
CO2 SERPL-SCNC: 33 MMOL/L (ref 21–32)
CREAT SERPL-MCNC: 0.91 MG/DL (ref 0.5–1.05)
EGFRCR SERPLBLD CKD-EPI 2021: 67 ML/MIN/1.73M*2
EOSINOPHIL # BLD AUTO: 0 X10*3/UL (ref 0–0.4)
EOSINOPHIL NFR BLD AUTO: 0 %
ERYTHROCYTE [DISTWIDTH] IN BLOOD BY AUTOMATED COUNT: 14.2 % (ref 11.5–14.5)
GLUCOSE BLD MANUAL STRIP-MCNC: 141 MG/DL (ref 74–99)
GLUCOSE BLD MANUAL STRIP-MCNC: 238 MG/DL (ref 74–99)
GLUCOSE BLD MANUAL STRIP-MCNC: 323 MG/DL (ref 74–99)
GLUCOSE BLD MANUAL STRIP-MCNC: 415 MG/DL (ref 74–99)
GLUCOSE BLD MANUAL STRIP-MCNC: 430 MG/DL (ref 74–99)
GLUCOSE SERPL-MCNC: 350 MG/DL (ref 74–99)
HCT VFR BLD AUTO: 37.8 % (ref 36–46)
HGB BLD-MCNC: 11.4 G/DL (ref 12–16)
IMM GRANULOCYTES # BLD AUTO: 0.14 X10*3/UL (ref 0–0.5)
IMM GRANULOCYTES NFR BLD AUTO: 1.5 % (ref 0–0.9)
LYMPHOCYTES # BLD AUTO: 0.74 X10*3/UL (ref 0.8–3)
LYMPHOCYTES NFR BLD AUTO: 7.8 %
MCH RBC QN AUTO: 26.5 PG (ref 26–34)
MCHC RBC AUTO-ENTMCNC: 30.2 G/DL (ref 32–36)
MCV RBC AUTO: 88 FL (ref 80–100)
MONOCYTES # BLD AUTO: 0.27 X10*3/UL (ref 0.05–0.8)
MONOCYTES NFR BLD AUTO: 2.8 %
NEUTROPHILS # BLD AUTO: 8.35 X10*3/UL (ref 1.6–5.5)
NEUTROPHILS NFR BLD AUTO: 87.8 %
NRBC BLD-RTO: 0 /100 WBCS (ref 0–0)
PLATELET # BLD AUTO: 259 X10*3/UL (ref 150–450)
POTASSIUM SERPL-SCNC: 4 MMOL/L (ref 3.5–5.3)
PROT SERPL-MCNC: 7 G/DL (ref 6.4–8.2)
RBC # BLD AUTO: 4.3 X10*6/UL (ref 4–5.2)
SODIUM SERPL-SCNC: 135 MMOL/L (ref 136–145)
WBC # BLD AUTO: 9.5 X10*3/UL (ref 4.4–11.3)

## 2025-04-21 PROCEDURE — 1100000001 HC PRIVATE ROOM DAILY

## 2025-04-21 PROCEDURE — 2500000002 HC RX 250 W HCPCS SELF ADMINISTERED DRUGS (ALT 637 FOR MEDICARE OP, ALT 636 FOR OP/ED)

## 2025-04-21 PROCEDURE — 99232 SBSQ HOSP IP/OBS MODERATE 35: CPT | Performed by: STUDENT IN AN ORGANIZED HEALTH CARE EDUCATION/TRAINING PROGRAM

## 2025-04-21 PROCEDURE — 2500000001 HC RX 250 WO HCPCS SELF ADMINISTERED DRUGS (ALT 637 FOR MEDICARE OP)

## 2025-04-21 PROCEDURE — 2500000002 HC RX 250 W HCPCS SELF ADMINISTERED DRUGS (ALT 637 FOR MEDICARE OP, ALT 636 FOR OP/ED): Performed by: STUDENT IN AN ORGANIZED HEALTH CARE EDUCATION/TRAINING PROGRAM

## 2025-04-21 PROCEDURE — 94669 MECHANICAL CHEST WALL OSCILL: CPT

## 2025-04-21 PROCEDURE — 36415 COLL VENOUS BLD VENIPUNCTURE: CPT | Performed by: INTERNAL MEDICINE

## 2025-04-21 PROCEDURE — 2500000004 HC RX 250 GENERAL PHARMACY W/ HCPCS (ALT 636 FOR OP/ED): Mod: JZ

## 2025-04-21 PROCEDURE — 82947 ASSAY GLUCOSE BLOOD QUANT: CPT

## 2025-04-21 PROCEDURE — 99238 HOSP IP/OBS DSCHRG MGMT 30/<: CPT | Performed by: STUDENT IN AN ORGANIZED HEALTH CARE EDUCATION/TRAINING PROGRAM

## 2025-04-21 PROCEDURE — 85025 COMPLETE CBC W/AUTO DIFF WBC: CPT | Performed by: INTERNAL MEDICINE

## 2025-04-21 PROCEDURE — 80053 COMPREHEN METABOLIC PANEL: CPT | Performed by: INTERNAL MEDICINE

## 2025-04-21 PROCEDURE — 2500000002 HC RX 250 W HCPCS SELF ADMINISTERED DRUGS (ALT 637 FOR MEDICARE OP, ALT 636 FOR OP/ED): Performed by: NURSE PRACTITIONER

## 2025-04-21 PROCEDURE — 94640 AIRWAY INHALATION TREATMENT: CPT

## 2025-04-21 RX ORDER — PREDNISONE 10 MG/1
TABLET ORAL
Qty: 30 TABLET | Refills: 0 | Status: SHIPPED | OUTPATIENT
Start: 2025-04-21 | End: 2025-04-28 | Stop reason: SDUPTHER

## 2025-04-21 RX ORDER — INSULIN GLARGINE 100 [IU]/ML
10 INJECTION, SOLUTION SUBCUTANEOUS EVERY 24 HOURS
Status: DISCONTINUED | OUTPATIENT
Start: 2025-04-21 | End: 2025-04-23 | Stop reason: HOSPADM

## 2025-04-21 RX ADMIN — INSULIN LISPRO 20 UNITS: 100 INJECTION, SOLUTION INTRAVENOUS; SUBCUTANEOUS at 12:58

## 2025-04-21 RX ADMIN — FUROSEMIDE 20 MG: 10 INJECTION, SOLUTION INTRAMUSCULAR; INTRAVENOUS at 21:55

## 2025-04-21 RX ADMIN — METHYLPREDNISOLONE SODIUM SUCCINATE 40 MG: 40 INJECTION, POWDER, FOR SOLUTION INTRAMUSCULAR; INTRAVENOUS at 06:14

## 2025-04-21 RX ADMIN — IPRATROPIUM BROMIDE AND ALBUTEROL SULFATE 3 ML: .5; 3 SOLUTION RESPIRATORY (INHALATION) at 11:22

## 2025-04-21 RX ADMIN — Medication 50 MCG: at 08:55

## 2025-04-21 RX ADMIN — METOPROLOL SUCCINATE 25 MG: 25 TABLET, EXTENDED RELEASE ORAL at 08:55

## 2025-04-21 RX ADMIN — IPRATROPIUM BROMIDE AND ALBUTEROL SULFATE 3 ML: .5; 3 SOLUTION RESPIRATORY (INHALATION) at 22:45

## 2025-04-21 RX ADMIN — LEVOTHYROXINE SODIUM 75 MCG: 0.07 TABLET ORAL at 06:14

## 2025-04-21 RX ADMIN — INSULIN LISPRO 8 UNITS: 100 INJECTION, SOLUTION INTRAVENOUS; SUBCUTANEOUS at 08:54

## 2025-04-21 RX ADMIN — ASPIRIN 81 MG: 81 TABLET, COATED ORAL at 12:00

## 2025-04-21 RX ADMIN — IPRATROPIUM BROMIDE AND ALBUTEROL SULFATE 3 ML: .5; 3 SOLUTION RESPIRATORY (INHALATION) at 07:02

## 2025-04-21 RX ADMIN — INSULIN GLARGINE 10 UNITS: 100 INJECTION, SOLUTION SUBCUTANEOUS at 14:28

## 2025-04-21 RX ADMIN — WARFARIN SODIUM 5 MG: 5 TABLET ORAL at 17:46

## 2025-04-21 RX ADMIN — INSULIN LISPRO 4 UNITS: 100 INJECTION, SOLUTION INTRAVENOUS; SUBCUTANEOUS at 17:46

## 2025-04-21 RX ADMIN — FUROSEMIDE 20 MG: 10 INJECTION, SOLUTION INTRAMUSCULAR; INTRAVENOUS at 08:55

## 2025-04-21 RX ADMIN — ALBUTEROL SULFATE 2.5 MG: 2.5 SOLUTION RESPIRATORY (INHALATION) at 04:55

## 2025-04-21 RX ADMIN — AMLODIPINE BESYLATE 5 MG: 5 TABLET ORAL at 08:55

## 2025-04-21 RX ADMIN — ATORVASTATIN CALCIUM 20 MG: 20 TABLET, FILM COATED ORAL at 21:55

## 2025-04-21 RX ADMIN — FERROUS SULFATE TAB 325 MG (65 MG ELEMENTAL FE) 325 MG: 325 (65 FE) TAB at 08:55

## 2025-04-21 ASSESSMENT — COGNITIVE AND FUNCTIONAL STATUS - GENERAL
CLIMB 3 TO 5 STEPS WITH RAILING: A LITTLE
DAILY ACTIVITIY SCORE: 24
CLIMB 3 TO 5 STEPS WITH RAILING: A LITTLE
DAILY ACTIVITIY SCORE: 24
MOBILITY SCORE: 23
CLIMB 3 TO 5 STEPS WITH RAILING: A LITTLE
MOBILITY SCORE: 23
DAILY ACTIVITIY SCORE: 24

## 2025-04-21 ASSESSMENT — PAIN SCALES - GENERAL
PAINLEVEL_OUTOF10: 0 - NO PAIN
PAINLEVEL_OUTOF10: 0 - NO PAIN

## 2025-04-21 NOTE — CARE PLAN
Problem: Pain - Adult  Goal: Verbalizes/displays adequate comfort level or baseline comfort level  Outcome: Progressing     Problem: Safety - Adult  Goal: Free from fall injury  Outcome: Progressing   The patient's goals for the shift include      The clinical goals for the shift include pt will maintain an Spo2 level above 89% on 2l throughout the shift    Over the shift, the patient did not make progress toward the following goals. Barriers to progression include . Recommendations to address these barriers include .

## 2025-04-21 NOTE — PROGRESS NOTES
Pulmonary Critical Care note    Patient Name :Genet Wilks  Date of service : 04/21/25  MRN: [id number]  YOB: 1950      Interval History:  4/21/2025:  Patient continues to be on supplemental oxygen.  She has no fever, chills, rigors.  Blood sugar has been elevated.  Wheezing has improved.  Chest x-ray from April 16, 2025 showed bilateral hazy opacities.  Chest x-ray was personally reviewed and independently interpreted.  Patient will be discussed with primary team.  INR is therapeutic.  4/20/2025:  Patient is currently on low-flow oxygen improving wheezing, no fever no chills, asking about going home  4/19/2025  Still room air to low-flow oxygen but still have significant wheezing and rhonchi  4/18/2025  Patient reports improve her shortness of breath, less dyspneic, no fever no chills.  History of Present Illness:      75 y.o. female  on day  4 of admission presenting with COPD (chronic obstructive pulmonary disease) (Multi).  Genet Wilks is a 75-year-old female who presents to the ED with shortness of breath. Patient has a past medical history of asthma, COPD wears at bedtime O2 and PRN, MI, atrial valve replacement, A-fib on Coumadin, CAD, heart failure, DM, HLD, and hypothyroidism. Patient reports on April 11/25 she began noticing increased shortness of breath. On Saturday morning she had to start wearing her oxygen during the day which she only typically wears at nighttime, she later had to increase from 2 LNC to 3 LNC. Patient did not notice much improvement and called her doctor to make an appointment to be seen but he was unable to get her in. Her PCP ordered an antibiotic for her which she was taking at home. Patient began to have diarrhea that was watery but nonbloody on Monday and Tuesday of this week. Patient also had 1 episode of nonbloody emesis. Patient denies any nausea since just a decreased appetite. Patient reports she has been drinking fluids. Patient denies any increase in  swelling. Patient denies any fevers at home. Patient does report having a worsening moist cough with a thick sputum production. Patient decided come to the ER today as the shortness of breath has been increasingly getting worse. Patient denies any chest pain, syncopal episodes, urinary symptoms weakness, or new open skin sores.   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, Personal history of other diseases of the circulatory system, Personal history of other diseases of the circulatory system, Personal history of other diseases of the circulatory system, Personal history of other diseases of the musculoskeletal system and connective tissue, Personal history of other diseases of the nervous system and sense organs, Personal history of other diseases of the respiratory system, Personal history of other endocrine, nutritional and metabolic disease, Personal history of other endocrine, nutritional and metabolic disease, Personal history of other endocrine, nutritional and metabolic disease, Personal history of other specified conditions, Presence of other heart-valve replacement, and Presence of prosthetic heart valve (06/02/2022).   has a past surgical history that includes Other surgical history (11/13/2014); Cardiac surgery (11/13/2014); Tubal ligation (11/13/2014); Other surgical history (11/13/2014); Tonsillectomy (11/13/2014); Knee surgery (06/15/2018); and Cardiac catheterization (06/15/2018).   reports that she has never smoked. She has never been exposed to tobacco smoke. She has never used smokeless tobacco. She reports that she does not currently use alcohol. She reports that she does not use drugs.  Family History:   Family medical history includes stroke in father.    Allergies:     Penicillins,  Prednisone, Mushroom, and Simvastatin      Social history:   reports that she has never smoked. She has never been exposed to tobacco smoke. She has never used smokeless tobacco. She reports that she does not currently use alcohol. She reports that she does not use drugs.      Review of Systems:   General: denies weight gain, denies loss of appetite, fever, chills, night sweats.  HEENT: denies headaches, dizziness, head trauma, visual changes, eye pain, tinnitus, nosebleeds, hoarseness or throat pain    Respiratory: denies chest pain, dyspnea, cough and hemoptysis  Cardiovascular: denies orthopnea, paroxysmal nocturnal dyspnea, leg swelling, and previous heart attack.    Gastrointestinal: denies pain, nausea vomiting, diarrhea, constipation, melena or bleeding.  Genitourinary: denies hematuria, frequency, urgency or dysuria  Neurology: denies syncope, seizures, paralysis, paraesthesia   Endocrine: denies polyuria, polydipsia, skin or hair changes, and heat or cold intolerance  Musculoskeletal: denies joint pain, swelling, arthritis or myalgia  Hematologic: denies bleeding, adenopathy and easy bruising  Skin: denies rashes and skin discoloration  Psychiatry: denies depression    Physical Exam:   Vital Signs:   Vitals:    04/21/25 1400   BP: 144/65   Pulse: 76   Resp:    Temp: 35.4 °C (95.7 °F)   SpO2: 92%     Vitals:    04/16/25 1107   Weight: 95.3 kg (210 lb)         Input/Output:    Intake/Output Summary (Last 24 hours) at 4/21/2025 1451  Last data filed at 4/21/2025 0651  Gross per 24 hour   Intake 1580 ml   Output 600 ml   Net 980 ml       Oxygen requirements:    Ventilator Information:  FiO2 (%):  [21 %-28 %] 28 %  Oxygen Therapy/Pulse Ox  Medical Gas Therapy: Supplemental oxygen  Medical Gas Delivery Method: Nasal cannula  FiO2 (%): 28 %  SpO2: 92 %  Patient Activity During SpO2 Measurement: At rest  Temp: 35.4 °C (95.7 °F)        General appearance: Not in pain or distress, in no respiratory distress    HEENT:  Atraumatic/normocephalic, EOMI, YVETTE, pharynx clear, moist mucosa, redness of the uvula appreciated,   Neck: Supple, no jugular venous distension, lymphadenopathy, thyromegaly or carotid bruits  Chest: Diffuse wheezing or rhonchi hopefully with more mobility agree with the current management of IV Solu-Medrol  P.o. azithromycin  Monitor diarrhea, syndrome is most consistent with viral infection  Cardiovascular: Normal S1, S2, regular rate and rhythm, no murmur, rub or gallop  Abdomen: Normal sounds present, soft, lax with no tenderness, no hepatosplenomegaly, and no masses  Extremities: No edema. Pulses are equally present.   Skin: intact, no rashes   Neurologic: Alert and oriented x 3, No focal deficit         Current Medications:   Scheduled medications  Scheduled Medications[1]  Continuous medications  Continuous Medications[2]  PRN medications  PRN Medications[3]      Investigations:  Labs, radiological imaging and cardiac work up were personally reviewed    Results from last 7 days   Lab Units 04/21/25  0638 04/20/25  0718 04/17/25  0722   SODIUM mmol/L 135* 140 135*   POTASSIUM mmol/L 4.0 4.1 3.8   CHLORIDE mmol/L 97* 100 100   CO2 mmol/L 33* 32 27   BUN mg/dL 41* 42* 28*   CREATININE mg/dL 0.91 1.05 0.94   GLUCOSE mg/dL 350* 332* 290*   CALCIUM mg/dL 9.1 9.3 9.1     Results from last 7 days   Lab Units 04/21/25  0638   WBC AUTO x10*3/uL 9.5   HEMOGLOBIN g/dL 11.4*   HEMATOCRIT % 37.8   PLATELETS AUTO x10*3/uL 259         Imaging  XR chest 2 views  Result Date: 4/16/2025  Mild vascular congestion. Signed by Alex Prado MD      Cardiology, Vascular, and Other Imaging  No other imaging results found for the past 7 days      Assessment  Genet Wilks IS 75 y.o. female  on day  4 of admission presenting with COPD (chronic obstructive pulmonary disease) (Multi). Actively being treated for the  following medical Problems:    Acute on chronic hypoxic respiratory failure  Acute exacerbation of  COPD    Recommendation:  Improved wheezing and rhonchi  Home O2 assessment, possible discharge soon  patient chest x-ray showed chronic infiltrate, no acute findings to indicate active pneumonia  Symptoms most consistent with COPD exacerbation in the context of a viral syndrome  S/P IV Rocephin/azithromycin  Oxygen for saturation of 89 to 94%  Incentive spirometry  Bronchodilators therapy as needed  PT/OT  DVT prophylaxis  Minimize benzodiazepine and narcotics  Encourage ambulation  Head of bed elevation and aspiration precautions.  INR is 2.7 which is therapeutic.        Christa Tran MD  Pulmonary critical care consultant  04/21/25  2:51 PM       STAFF PHYSICIAN NOTE OF PERSONAL INVOLVEMENT IN CARE  As the attending physician, I certify that I personally reviewed the patient's history and personally examined the patient to confirm the physical findings described above, and that I reviewed the relevant imaging studies and available reports.  I also discussed the differential diagnosis and all of the proposed management plans with the patient and individuals accompanying the patient to this visit.  They had the opportunity to ask questions about the proposed management plans and to have those questions answered.           [1] amLODIPine, 5 mg, oral, Daily  aspirin, 81 mg, oral, Daily before lunch  atorvastatin, 20 mg, oral, Nightly  cholecalciferol, 50 mcg, oral, Daily  ferrous sulfate, 65 mg of iron, oral, Daily with breakfast  furosemide, 20 mg, intravenous, BID  insulin glargine, 10 Units, subcutaneous, q24h  insulin lispro, 0-10 Units, subcutaneous, Before meals & nightly  ipratropium-albuteroL, 3 mL, nebulization, q8h  levothyroxine, 75 mcg, oral, Daily before breakfast  metoprolol succinate XL, 25 mg, oral, Daily  warfarin, 2.5 mg, oral, Once per day on Sunday Tuesday Wednesday Thursday Friday Saturday  warfarin, 5 mg, oral, Every Monday     [2]    [3] PRN medications: acetaminophen, albuterol,  benzonatate, dextrose, dextrose, glucagon, glucagon, guaiFENesin, melatonin, ondansetron, oxygen

## 2025-04-21 NOTE — DISCHARGE SUMMARY
Discharge Diagnosis  COPD (chronic obstructive pulmonary disease) (Multi)    Issues Requiring Follow-Up  copd    Test Results Pending At Discharge  Pending Labs       No current pending labs.            Hospital Course   Patient arrived to ED today after having symptoms of shortness of breath for 6 days.  Patient typically only wears oxygen at night for her COPD.  Patient has been wearing oxygen during the day for the past 5 days and has had to increase it to 3 L nasal cannula from 2 L.  Patient called her doctor to get an appointment was able to get in but had an antibiotic ordered which she has been taking without improvement in her symptoms.  Chest x-ray today in ED revealed mild vascular congestion with bilateral faint hazy opacities in the inferior lung bases which is similar to prior chest x-ray in January in which patient had pneumonia.  Patient had mild elevated BNP today.  Pulmonology consult placed and cardiology consult placed, appreciate recs.  Pending patient's INR levels, other antibiotics may need to be considered due to Coumadin.     Patient admitted to Dr. Rod burgos for further medical management.      # Pneumonia  # COPD exacerbation  # Acute hypoxia  # Shortness of breath  # Diarrhea  -Antibiotics initiated in ED, will continue azithromycin (check daily INR)  -Procalcitonin ordered  -Flu/COVID/RSV ordered  -DuoNeb scheduled, albuterol as needed  -Mucinex twice daily  -Tessalon Perles  -Solu-Medrol every 8  -Strep pneumo/Legionella urine  -As needed oxygen >92%  -Cardiac diet   -admitted to observation with telemetry    - q4 vitals    -morning labs, CBC, BMP  - Chest x-ray revealed faint hazy opacities representing pneumonia which were similar on prior x-ray in January 2025.     # CHF exacerbation  # Vascular congestion  # Elevated BNP  # Elevated troponin  # Subtherapeutic INR  - Cardiology consult  - Fluid restriction  - Strict I's and O's  - Daily weight  - Last echo 1/6/2025, EF 55%, left  atrium moderately dilated, AVR,  - Lasix given in ED 40 mg, will continue 20 mg twice daily  -PT/OT/SW   -Daily INR, trend trop, mg+        Chronic Conditions   # CAD, HLD  # MI, atrial valve replacement, A-fib, CAD  # DM  -Hold home glycemic medication   -SSI with hypoglycemic protocol   -POCT  # Hypothyroidism  # Asthma     Continue home medications as ordered   Full Code      #DVT Prophylaxis   Scd's as tolerated   DVT Prophylaxis   On eliquis     Thorough record review performed of previous labs and notes from prior encounters.      4/17: breathing has improved, still wheezy, continue treatments, suspect dc in next 24-48 hrs     4/19 doing well no issues contineu treatments    [place], still wheezing but feels well, decliens [facility], ok to go home, dc on prednisone taper    Pertinent Physical Exam At Time of Discharge  Physical Exam  Constitutional:       Appearance: Normal appearance.   HENT:      Head: Normocephalic and atraumatic.      Right Ear: Tympanic membrane and ear canal normal.      Left Ear: Tympanic membrane and ear canal normal.      Mouth/Throat:      Mouth: Mucous membranes are moist.      Pharynx: Oropharynx is clear.   Eyes:      Extraocular Movements: Extraocular movements intact.      Conjunctiva/sclera: Conjunctivae normal.      Pupils: Pupils are equal, round, and reactive to light.   Cardiovascular:      Rate and Rhythm: Normal rate and regular rhythm.      Pulses: Normal pulses.      Heart sounds: Normal heart sounds.   Pulmonary:      Effort: Pulmonary effort is normal.      Breath sounds: Normal breath sounds.   Abdominal:      General: Abdomen is flat. Bowel sounds are normal.      Palpations: Abdomen is soft.   Musculoskeletal:         General: Normal range of motion.      Cervical back: Normal range of motion and neck supple.   Skin:     General: Skin is warm and dry.      Capillary Refill: Capillary refill takes 2 to 3 seconds.   Neurological:      General: No focal deficit present.       Mental Status: She is alert and oriented to person, place, and time. Mental status is at baseline.   Psychiatric:         Mood and Affect: Mood normal.         Behavior: Behavior normal.         Thought Content: Thought content normal.         Judgment: Judgment normal.         Home Medications     Medication List      START taking these medications     predniSONE 10 mg tablet; Commonly known as: Deltasone; Take 5 tablets   (50 mg) by mouth once daily for 2 days, THEN 4 tablets (40 mg) once daily   for 2 days, THEN 3 tablets (30 mg) once daily for 2 days, THEN 2 tablets   (20 mg) once daily for 2 days, THEN 1 tablet (10 mg) once daily for 2   days.; Start taking on: April 21, 2025     CHANGE how you take these medications     furosemide 40 mg tablet; Commonly known as: Lasix; Take 1 tablet (40 mg)   by mouth once daily as needed (swelling).; What changed: when to take this   glimepiride 2 mg tablet; Commonly known as: Amaryl; Increase to BID for   elevated blood sugar; What changed: how much to take, how to take this,   when to take this     CONTINUE taking these medications     * albuterol 0.63 mg/3 mL nebulizer solution; Take 3 mL (0.63 mg) by   nebulization 3 times a day.   * albuterol 90 mcg/actuation inhaler; Inhale 2 puffs every 6 hours if   needed for wheezing or shortness of breath.   amLODIPine 5 mg tablet; Commonly known as: Norvasc; Take 1 tablet by   mouth once daily   aspirin 81 mg EC tablet   atorvastatin 20 mg tablet; Commonly known as: Lipitor; TAKE 1 TABLET BY   MOUTH AT BEDTIME   benzonatate 100 mg capsule; Commonly known as: Tessalon; Take 1 capsule   (100 mg) by mouth 3 times a day as needed for cough.   cholecalciferol 50 mcg (2,000 units) tablet; Commonly known as: Vitamin   D-3   doxycycline 100 mg capsule; Commonly known as: Vibramycin; Take 1   capsule (100 mg) by mouth 2 times a day for 10 days. Take with at least 8   ounces (large glass) of water, do not lie down for 30 minutes  after   iron 325 mg (65 mg elemental) tablet; Generic drug: ferrous sulfate   levothyroxine 75 mcg tablet; Commonly known as: Synthroid, Levoxyl; TAKE   1 TABLET BY MOUTH ONCE DAILY AS DIRECTED   metoprolol succinate XL 25 mg 24 hr tablet; Commonly known as:   Toprol-XL; Take 1 tablet (25 mg) by mouth once daily.   OneTouch Delica Plus Lancet 33 gauge misc; Generic drug: lancets; USE 1   TO CHECK GLUCOSE ONCE DAILY   OneTouch Ultra Test; Generic drug: blood sugar diagnostic; USE 1 STRIP   TO CHECK GLUCOSE ONCE DAILY   oxygen gas therapy; Commonly known as: O2   warfarin 5 mg tablet; Commonly known as: Coumadin; Take as directed. If   you are unsure how to take this medication, talk to your nurse or doctor.;   Original instructions: Take 5 mg (1 tablet) every Monday and 2.5 mg (1/2   tablet) all other days or as directed by the anticoagulation clinic.  * This list has 2 medication(s) that are the same as other medications   prescribed for you. Read the directions carefully, and ask your doctor or   other care provider to review them with you.     STOP taking these medications     enoxaparin 100 mg/mL syringe; Commonly known as: Lovenox       Outpatient Follow-Up  Future Appointments   Date Time Provider Department Center   4/24/2025  2:45 PM Lul Day MD [id number] [place]   4/28/2025  1:45 PM VIKAS [id number] PHARM [facility] PHARMACIST [id number] [place]   6/30/2025  1:30 PM DO [id number] [place]       Rod Hansen DO

## 2025-04-21 NOTE — NURSING NOTE
"Heart Failure Nurse Navigator      Assessment    I met with Genet Wilks at the bedside    Patients Cardiologist(s): Dr Burden/Kavitha    Patients Primary Care Provider:    1. Medical Domain  What is the patient's most recent LVEF? 55%  HFrEF (LVEF <= 40%) Quadruple therapy recommended  HFmrEF (LVEF 41-49%) Quadruple therapy recommended  HFpEF (LVEF >= 50%) Minimum recommendations: SGLT2i and MRA  Is the patient on OP GDMT for their condition?   ARNI/ACEI/ARB: No None  BB: Yes Metoprolol succinate 25 mg daily  MRA: No None  SGLT2i: No None  Is the patient prescribed a diuretic? Yes  Furosemide 20 mg daily  Does this patient have an implanted device (ie cardioMEMS, ICD, CRT-D)?  Device type: .  Could this patient have advanced heart failure (Stage D heart failure)?: No   If yes, the potential markers of advanced heart failure include: .    REFERENCE: Potential markers of advanced HF   Inotrope (dobutamine or milrinone) used during this admission?   LVEF<=25%?   2.   >=2 hospitalizations for ADHF in the last year?   3.   Severe symptoms of HF (fatigue, dyspnea, confusion, edema) despite medical therapy?   4.  Downtitration of GDMT as compared to home medications?   5.  Discontinuation of GDMT because of hypotension or renal intolerance?   6.  Recurrent arrhythmias (AF, VT with ICD shocks)?   7.  Cardiac cachexia (i.e., unintentional weight loss due to HF)?   8.  High-risk biomarker profile (e.g., hyponatremia [Na<135], very elevated BNP, worsening kidney function)   9.  Escalating doses of diuretics or persistent edema despite escalation     2. Mind and Emotion  Does this patient have possible cognitive impairment?: No (The Mini-Cog score 0/5)  Ask the patient to memorize these 3 words: banana, sunrise, chair  Ask the patient to draw a clock with hands pointing at \"20 minutes after 8\"  Ask the patient to recall the 3 words  Score: Add number of words recalled + clock drawing (0 points for any errors, 2 points " if correct)  Interpretation: A score of 0-2 suggests cognitive impairment is present, a score of 3-5 suggests cognitive impairment is absent  Does this patient have major depression?: N/A (PH-Q2 score ./6)  Over the last 2 weeks: Little interest or pleasure in doing things? (Not at all +0, Several days +1, More than half the days +2, Nearly every day +3)  Over the last 2 weeks: Feeling down, depressed or hopeless? (Not at all +0, Several days +1, More than half the days +2, Nearly every day +3)  Score: Add points  Interpretation: A score of 3 or more suggests that major depression is likely.     3. Physical Function  Could this patient be frail?: Yes   Defined by presence of all of these: slowness, weakness, shrinking, inactivity, exhaustion  Is this patient at risk for falling?: Yes   Defined by having experienced a fall in the last 12 months.    4. Social Determinants of Health  Transportation deficits?: No   Lack of insurance?: No   Living conditions (homelessness, unstable home)?: No   Poor family/social support?: No     I provided the following heart failure education:  - Heart Failure education packet provided.  - HF signs and symptoms, heart failure zones and when to call cardiologist.   - Controlling Heart Failure at Home: medication adherence, following up with cardiologist at least once yearly, staying healthy and active, limiting sodium and fluid intake as directed by cardiologist.  - Daily Weight Education  -Low Sodium Diet Education  -Fluid Restriction Education      *Discharge Appointment Follow-up Plan:        Additional Comments:    Pt hoping to go home today or tomorrow. Family lives upstairs from her. Pt wears oxygen. Feels better. Taking her lasix as ordered

## 2025-04-21 NOTE — PROGRESS NOTES
Subjective Data:  Stable overnight.    Objective Data:  Last Recorded Vitals:  Vitals:    04/21/25 0210 04/21/25 0434 04/21/25 0455 04/21/25 0702   BP: 159/66 173/67     BP Location: Left arm Left arm     Patient Position: Lying Lying     Pulse:  69     Resp:  16     Temp:  35.8 °C (96.4 °F)     TempSrc:  Temporal     SpO2:  96% 96% 95%   Weight:       Height:           Last Labs:  CBC - 4/21/2025:  6:38 AM  9.5 11.4 259    37.8      CMP - 4/21/2025:  6:38 AM  9.1 7.0 9 --- 0.9   _ 3.8 10 72      PTT - 4/17/2025:  7:22 AM  2.7   30.1 27     TROPHS   Date/Time Value Ref Range Status   04/17/2025 07:22 AM 11 0 - 13 ng/L Final   04/16/2025 01:20 PM 19 0 - 13 ng/L Final   04/16/2025 12:12 PM 24 0 - 13 ng/L Final     BNP   Date/Time Value Ref Range Status   04/16/2025 12:12  0 - 99 pg/mL Final   12/31/2024 11:41  0 - 99 pg/mL Final     HGBA1C   Date/Time Value Ref Range Status   02/03/2025 03:45 PM 8.5 4.2 - 6.5 % Final   09/09/2024 04:20 PM 7.6 4.2 - 6.5 % Final   02/02/2023 02:04 PM 6.2 4.2 - 6.5 % Final   05/04/2022 03:50 PM 6.1 4.2 - 6.5 % Final     LDLCALC   Date/Time Value Ref Range Status   09/09/2024 04:30 PM 98 <=99 mg/dL Final     Comment:                                 Near   Borderline      AGE      Desirable  Optimal    High     High     Very High     0-19 Y     0 - 109     ---    110-129   >/= 130     ----    20-24 Y     0 - 119     ---    120-159   >/= 160     ----      >24 Y     0 -  99   100-129  130-159   160-189     >/=190     11/09/2023 03:26 PM 85 <=99 mg/dL Final     Comment:                                 Near   Borderline      AGE      Desirable  Optimal    High     High     Very High     0-19 Y     0 - 109     ---    110-129   >/= 130     ----    20-24 Y     0 - 119     ---    120-159   >/= 160     ----      >24 Y     0 -  99   100-129  130-159   160-189     >/=190       VLDL   Date/Time Value Ref Range Status   09/09/2024 04:30 PM 28 0 - 40 mg/dL Final   11/09/2023 03:26 PM 30 0 -  40 mg/dL Final   05/11/2023 01:57 PM 31 0 - 40 mg/dL Final   02/02/2023 07:44 AM 28 0 - 40 mg/dL Final   05/04/2022 12:00 AM 22 0 - 40 mg/dL Final      Last I/O:  I/O last 3 completed shifts:  In: 2532 (26.6 mL/kg) [P.O.:2532]  Out: 850 (8.9 mL/kg) [Urine:850 (0.2 mL/kg/hr)]  Weight: 95.3 kg     Past Cardiology Tests (Last 3 Years):  EKG:  ECG 12 Lead 01/23/2025      Electrocardiogram, 12-lead PRN ACS symptoms 01/03/2025      ECG 12 lead 12/31/2024      ECG 12 lead (Ancillary Performed) 12/17/2024      ECG 12 Lead 09/05/2024      ECG 12 Lead 03/20/2024      ECG 12 Lead 12/11/2023    Echo:  Transthoracic Echo (TTE) Complete 01/06/2025      Transthoracic Echo (TTE) Complete 03/20/2024    Ejection Fractions:  EF   Date/Time Value Ref Range Status   01/06/2025 09:45 AM 55 %    03/20/2024 02:02 PM 63 %      Cath:  No results found for this or any previous visit from the past 1095 days.    Stress Test:  No results found for this or any previous visit from the past 1095 days.    Cardiac Imaging:  No results found for this or any previous visit from the past 1095 days.      Inpatient Medications:  Scheduled Medications[1]  PRN Medications[2]  Continuous Medications[3]    Physical Exam:  General: No acute distress,  A&O x3, supplemental oxygen  Skin: Warm and dry  Neck: JVD is not elevated  ENT: Moist mucous membranes no lesions appreciated  Pulmonary: Rhonchi in anterior lung fields  Cards: Regular rate rhythm, no murmurs gallops or rubs appreciated normal S1-S2  Abdomen: Soft nontender nondistended  Extremities: No edema or cyanosis  Psych: Appropriate mood and affect         Assessment/Plan  1.  Acute on chronic respiratory failure: Appears provoked in setting of upper respiratory infection.  Combination of COPD exacerbation with heart failure exacerbation.  Improving with treatment.  - Continue diuresis with IV Lasix aiming for goal of -1-2 L over next 24 hours  Continue antibiotics and steroids which is managed by  primary team-     2.  History of mitral valve disease status post mechanical mitral valve prosthesis subtherapeutic INR.  Goal INR is between 2.5-3.5 given the presence of prosthetic mitral valve.  Labs today are pending.  - Continue aspirin 81 mg daily     3.  History of CAD/MI: No chest pain.     4.  History of atrial fibrillation: Appears permanent as patient is atrial fibrillation today.  She is on warfarin for CVA prophylaxis.     (This note was generated with voice recognition software and may contain errors including spelling, grammar, syntax and missed recognition of what was dictated, of which may not have been fully corrected)         April 19, 2025  Patient reports that her shortness of breath is better but is still not at her baseline and especially the cough bothers her.  Will continue with current plan for diuresis and we will obtain labs tomorrow morning.  Need to make sure the potassium is above 4 and magnesium above 2.  Will also recheck INR.  Her heart rate and atrial fibrillation were well-controlled.  I will follow-up with her tomorrow.  Plan for possible discharge early next week     April 20, 2025  Renal function is good and electrolytes.  INR is good at 2.7.  Unfortunately does not appear hide good urine output record.  I sent a note to the nurse.  Dr. Plummer will resume seeing care from tomorrow.    4/21/2025:  Okay to transition to home dose of Lasix at discharge.  Patient will need  Follow-up in clinic with Dr. Day within 2 to 4 weeks.    Code Status:  Full Code    Mich Plummer MD PhD       [1]   Scheduled medications   Medication Dose Route Frequency    acetylcysteine  6 mL nebulization q8h    amLODIPine  5 mg oral Daily    aspirin  81 mg oral Daily before lunch    atorvastatin  20 mg oral Nightly    cholecalciferol  50 mcg oral Daily    ferrous sulfate  65 mg of iron oral Daily with breakfast    furosemide  20 mg intravenous BID    insulin lispro  0-10 Units subcutaneous  Before meals & nightly    ipratropium-albuteroL  3 mL nebulization q8h    levothyroxine  75 mcg oral Daily before breakfast    methylPREDNISolone sodium succinate (PF)  40 mg intravenous q8h KIMBER    metoprolol succinate XL  25 mg oral Daily    warfarin  2.5 mg oral Once per day on Sunday Tuesday Wednesday Thursday Friday Saturday    warfarin  5 mg oral Every Monday   [2]   PRN medications   Medication    acetaminophen    albuterol    benzonatate    dextrose    dextrose    glucagon    glucagon    guaiFENesin    melatonin    ondansetron    oxygen   [3]   Continuous Medications   Medication Dose Last Rate

## 2025-04-22 LAB
ALBUMIN SERPL BCP-MCNC: 3.5 G/DL (ref 3.4–5)
ALP SERPL-CCNC: 62 U/L (ref 33–136)
ALT SERPL W P-5'-P-CCNC: 12 U/L (ref 7–45)
ANION GAP SERPL CALC-SCNC: 10 MMOL/L (ref 10–20)
AST SERPL W P-5'-P-CCNC: 13 U/L (ref 9–39)
BASOPHILS # BLD AUTO: 0.01 X10*3/UL (ref 0–0.1)
BASOPHILS NFR BLD AUTO: 0.1 %
BILIRUB SERPL-MCNC: 0.9 MG/DL (ref 0–1.2)
BUN SERPL-MCNC: 38 MG/DL (ref 6–23)
CALCIUM SERPL-MCNC: 9.1 MG/DL (ref 8.6–10.3)
CHLORIDE SERPL-SCNC: 100 MMOL/L (ref 98–107)
CO2 SERPL-SCNC: 34 MMOL/L (ref 21–32)
CREAT SERPL-MCNC: 0.91 MG/DL (ref 0.5–1.05)
EGFRCR SERPLBLD CKD-EPI 2021: 67 ML/MIN/1.73M*2
EOSINOPHIL # BLD AUTO: 0.01 X10*3/UL (ref 0–0.4)
EOSINOPHIL NFR BLD AUTO: 0.1 %
ERYTHROCYTE [DISTWIDTH] IN BLOOD BY AUTOMATED COUNT: 14.3 % (ref 11.5–14.5)
GLUCOSE BLD MANUAL STRIP-MCNC: 130 MG/DL (ref 74–99)
GLUCOSE BLD MANUAL STRIP-MCNC: 164 MG/DL (ref 74–99)
GLUCOSE BLD MANUAL STRIP-MCNC: 199 MG/DL (ref 74–99)
GLUCOSE BLD MANUAL STRIP-MCNC: 208 MG/DL (ref 74–99)
GLUCOSE BLD MANUAL STRIP-MCNC: 210 MG/DL (ref 74–99)
GLUCOSE SERPL-MCNC: 178 MG/DL (ref 74–99)
HCT VFR BLD AUTO: 37.1 % (ref 36–46)
HGB BLD-MCNC: 11.8 G/DL (ref 12–16)
IMM GRANULOCYTES # BLD AUTO: 0.14 X10*3/UL (ref 0–0.5)
IMM GRANULOCYTES NFR BLD AUTO: 1.4 % (ref 0–0.9)
LYMPHOCYTES # BLD AUTO: 1.48 X10*3/UL (ref 0.8–3)
LYMPHOCYTES NFR BLD AUTO: 14.6 %
MCH RBC QN AUTO: 27.1 PG (ref 26–34)
MCHC RBC AUTO-ENTMCNC: 31.8 G/DL (ref 32–36)
MCV RBC AUTO: 85 FL (ref 80–100)
MONOCYTES # BLD AUTO: 0.82 X10*3/UL (ref 0.05–0.8)
MONOCYTES NFR BLD AUTO: 8.1 %
NEUTROPHILS # BLD AUTO: 7.65 X10*3/UL (ref 1.6–5.5)
NEUTROPHILS NFR BLD AUTO: 75.7 %
NRBC BLD-RTO: 0 /100 WBCS (ref 0–0)
PLATELET # BLD AUTO: 279 X10*3/UL (ref 150–450)
POTASSIUM SERPL-SCNC: 3.6 MMOL/L (ref 3.5–5.3)
PROT SERPL-MCNC: 6.4 G/DL (ref 6.4–8.2)
RBC # BLD AUTO: 4.36 X10*6/UL (ref 4–5.2)
SODIUM SERPL-SCNC: 140 MMOL/L (ref 136–145)
WBC # BLD AUTO: 10.1 X10*3/UL (ref 4.4–11.3)

## 2025-04-22 PROCEDURE — 82947 ASSAY GLUCOSE BLOOD QUANT: CPT

## 2025-04-22 PROCEDURE — 2500000002 HC RX 250 W HCPCS SELF ADMINISTERED DRUGS (ALT 637 FOR MEDICARE OP, ALT 636 FOR OP/ED): Performed by: STUDENT IN AN ORGANIZED HEALTH CARE EDUCATION/TRAINING PROGRAM

## 2025-04-22 PROCEDURE — 36415 COLL VENOUS BLD VENIPUNCTURE: CPT | Performed by: INTERNAL MEDICINE

## 2025-04-22 PROCEDURE — 1100000001 HC PRIVATE ROOM DAILY

## 2025-04-22 PROCEDURE — 2500000004 HC RX 250 GENERAL PHARMACY W/ HCPCS (ALT 636 FOR OP/ED): Mod: JZ

## 2025-04-22 PROCEDURE — 2500000002 HC RX 250 W HCPCS SELF ADMINISTERED DRUGS (ALT 637 FOR MEDICARE OP, ALT 636 FOR OP/ED): Performed by: NURSE PRACTITIONER

## 2025-04-22 PROCEDURE — 84075 ASSAY ALKALINE PHOSPHATASE: CPT | Performed by: INTERNAL MEDICINE

## 2025-04-22 PROCEDURE — 94640 AIRWAY INHALATION TREATMENT: CPT

## 2025-04-22 PROCEDURE — 2500000002 HC RX 250 W HCPCS SELF ADMINISTERED DRUGS (ALT 637 FOR MEDICARE OP, ALT 636 FOR OP/ED)

## 2025-04-22 PROCEDURE — 2500000001 HC RX 250 WO HCPCS SELF ADMINISTERED DRUGS (ALT 637 FOR MEDICARE OP)

## 2025-04-22 PROCEDURE — 99232 SBSQ HOSP IP/OBS MODERATE 35: CPT | Performed by: STUDENT IN AN ORGANIZED HEALTH CARE EDUCATION/TRAINING PROGRAM

## 2025-04-22 PROCEDURE — 85025 COMPLETE CBC W/AUTO DIFF WBC: CPT | Performed by: INTERNAL MEDICINE

## 2025-04-22 PROCEDURE — 94669 MECHANICAL CHEST WALL OSCILL: CPT

## 2025-04-22 RX ADMIN — INSULIN LISPRO 4 UNITS: 100 INJECTION, SOLUTION INTRAVENOUS; SUBCUTANEOUS at 17:51

## 2025-04-22 RX ADMIN — LEVOTHYROXINE SODIUM 75 MCG: 0.07 TABLET ORAL at 06:24

## 2025-04-22 RX ADMIN — INSULIN GLARGINE 10 UNITS: 100 INJECTION, SOLUTION SUBCUTANEOUS at 13:54

## 2025-04-22 RX ADMIN — IPRATROPIUM BROMIDE AND ALBUTEROL SULFATE 3 ML: .5; 3 SOLUTION RESPIRATORY (INHALATION) at 11:22

## 2025-04-22 RX ADMIN — INSULIN LISPRO 2 UNITS: 100 INJECTION, SOLUTION INTRAVENOUS; SUBCUTANEOUS at 20:45

## 2025-04-22 RX ADMIN — ATORVASTATIN CALCIUM 20 MG: 20 TABLET, FILM COATED ORAL at 20:45

## 2025-04-22 RX ADMIN — INSULIN LISPRO 2 UNITS: 100 INJECTION, SOLUTION INTRAVENOUS; SUBCUTANEOUS at 08:20

## 2025-04-22 RX ADMIN — IPRATROPIUM BROMIDE AND ALBUTEROL SULFATE 3 ML: .5; 3 SOLUTION RESPIRATORY (INHALATION) at 22:37

## 2025-04-22 RX ADMIN — IPRATROPIUM BROMIDE AND ALBUTEROL SULFATE 3 ML: .5; 3 SOLUTION RESPIRATORY (INHALATION) at 06:43

## 2025-04-22 RX ADMIN — FERROUS SULFATE TAB 325 MG (65 MG ELEMENTAL FE) 325 MG: 325 (65 FE) TAB at 08:15

## 2025-04-22 RX ADMIN — ASPIRIN 81 MG: 81 TABLET, COATED ORAL at 12:42

## 2025-04-22 RX ADMIN — FUROSEMIDE 20 MG: 10 INJECTION, SOLUTION INTRAMUSCULAR; INTRAVENOUS at 08:15

## 2025-04-22 RX ADMIN — METOPROLOL SUCCINATE 25 MG: 25 TABLET, EXTENDED RELEASE ORAL at 08:14

## 2025-04-22 RX ADMIN — INSULIN LISPRO 4 UNITS: 100 INJECTION, SOLUTION INTRAVENOUS; SUBCUTANEOUS at 12:14

## 2025-04-22 RX ADMIN — WARFARIN SODIUM 2.5 MG: 2.5 TABLET ORAL at 16:27

## 2025-04-22 RX ADMIN — FUROSEMIDE 20 MG: 10 INJECTION, SOLUTION INTRAMUSCULAR; INTRAVENOUS at 20:45

## 2025-04-22 RX ADMIN — Medication 50 MCG: at 08:15

## 2025-04-22 RX ADMIN — AMLODIPINE BESYLATE 5 MG: 5 TABLET ORAL at 08:15

## 2025-04-22 ASSESSMENT — COGNITIVE AND FUNCTIONAL STATUS - GENERAL
MOBILITY SCORE: 23
DAILY ACTIVITIY SCORE: 24
CLIMB 3 TO 5 STEPS WITH RAILING: A LITTLE

## 2025-04-22 ASSESSMENT — PAIN SCALES - GENERAL
PAINLEVEL_OUTOF10: 0 - NO PAIN
PAINLEVEL_OUTOF10: 0 - NO PAIN

## 2025-04-22 ASSESSMENT — PAIN SCALES - WONG BAKER: WONGBAKER_NUMERICALRESPONSE: NO HURT

## 2025-04-22 NOTE — PROGRESS NOTES
"Genet Wilks is a 75 y.o. female on day 5 of admission presenting with COPD (chronic obstructive pulmonary disease) (Multi).    Subjective   Doing better sugars better       Objective     Physical Exam  Constitutional:       Appearance: Normal appearance.   HENT:      Head: Normocephalic and atraumatic.      Right Ear: Tympanic membrane and ear canal normal.      Left Ear: Tympanic membrane and ear canal normal.      Mouth/Throat:      Mouth: Mucous membranes are moist.      Pharynx: Oropharynx is clear.   Eyes:      Extraocular Movements: Extraocular movements intact.      Conjunctiva/sclera: Conjunctivae normal.      Pupils: Pupils are equal, round, and reactive to light.   Cardiovascular:      Rate and Rhythm: Normal rate and regular rhythm.      Pulses: Normal pulses.      Heart sounds: Normal heart sounds.   Pulmonary:      Effort: Pulmonary effort is normal.      Breath sounds: Normal breath sounds.   Abdominal:      General: Abdomen is flat. Bowel sounds are normal.      Palpations: Abdomen is soft.   Musculoskeletal:         General: Normal range of motion.      Cervical back: Normal range of motion and neck supple.   Skin:     General: Skin is warm and dry.      Capillary Refill: Capillary refill takes 2 to 3 seconds.   Neurological:      General: No focal deficit present.      Mental Status: She is alert and oriented to person, place, and time. Mental status is at baseline.   Psychiatric:         Mood and Affect: Mood normal.         Behavior: Behavior normal.         Thought Content: Thought content normal.         Judgment: Judgment normal.         Last Recorded Vitals  Blood pressure 164/71, pulse 61, temperature 35.9 °C (96.6 °F), temperature source Temporal, resp. rate 16, height 1.575 m (5' 2\"), weight 84.2 kg (185 lb 9.6 oz), SpO2 96%.  Intake/Output last 3 Shifts:  I/O last 3 completed shifts:  In: 2427 (28.8 mL/kg) [P.O.:2427]  Out: 1800 (21.4 mL/kg) [Urine:1800 (0.6 mL/kg/hr)]  Weight: 84.2 kg "     Relevant Results                              Assessment & Plan  COPD (chronic obstructive pulmonary disease) (Multi)    CHF exacerbation (Multi)    Bilateral pneumonia    Diarrhea    COPD exacerbation (Multi)    Patient arrived to ED today after having symptoms of shortness of breath for 6 days.  Patient typically only wears oxygen at night for her COPD.  Patient has been wearing oxygen during the day for the past 5 days and has had to increase it to 3 L nasal cannula from 2 L.  Patient called her doctor to get an appointment was able to get in but had an antibiotic ordered which she has been taking without improvement in her symptoms.  Chest x-ray today in ED revealed mild vascular congestion with bilateral faint hazy opacities in the inferior lung bases which is similar to prior chest x-ray in January in which patient had pneumonia.  Patient had mild elevated BNP today.  Pulmonology consult placed and cardiology consult placed, appreciate recs.  Pending patient's INR levels, other antibiotics may need to be considered due to Coumadin.     Patient admitted to Dr. Rod burgos for further medical management.      # Pneumonia  # COPD exacerbation  # Acute hypoxia  # Shortness of breath  # Diarrhea  -Antibiotics initiated in ED, will continue azithromycin (check daily INR)  -Procalcitonin ordered  -Flu/COVID/RSV ordered  -DuoNeb scheduled, albuterol as needed  -Mucinex twice daily  -Tessalon Perles  -Solu-Medrol every 8  -Strep pneumo/Legionella urine  -As needed oxygen >92%  -Cardiac diet   -admitted to observation with telemetry    - q4 vitals    -morning labs, CBC, BMP  - Chest x-ray revealed faint hazy opacities representing pneumonia which were similar on prior x-ray in January 2025.     # CHF exacerbation  # Vascular congestion  # Elevated BNP  # Elevated troponin  # Subtherapeutic INR  - Cardiology consult  - Fluid restriction  - Strict I's and O's  - Daily weight  - Last echo 1/6/2025, EF 55%, left atrium  moderately dilated, AVR,  - Lasix given in ED 40 mg, will continue 20 mg twice daily  -PT/OT/SW   -Daily INR, trend trop, mg+        Chronic Conditions   # CAD, HLD  # MI, atrial valve replacement, A-fib, CAD  # DM  -Hold home glycemic medication   -SSI with hypoglycemic protocol   -POCT  # Hypothyroidism  # Asthma     Continue home medications as ordered   Full Code      #DVT Prophylaxis   Scd's as tolerated   DVT Prophylaxis   On eliquis     Thorough record review performed of previous labs and notes from prior encounters.      4/17: breathing has improved, still wheezy, continue treatments, suspect dc in next 24-48 hrs    4/19 doing well no issues contineu treatments    4/22: sugars better contorled ok to dc    I spent 35 minutes in the professional and overall care of this patient.      Rod Hansen, DO

## 2025-04-22 NOTE — CARE PLAN
Problem: Pain - Adult  Goal: Verbalizes/displays adequate comfort level or baseline comfort level  Outcome: Progressing     Problem: Safety - Adult  Goal: Free from fall injury  Outcome: Progressing   The patient's goals for the shift include      The clinical goals for the shift include pt will have blood sugar controlled throughout the shift    Over the shift, the patient did not make progress toward the following goals. Barriers to progression include . Recommendations to address these barriers include .

## 2025-04-22 NOTE — PROGRESS NOTES
Pulmonary Critical Care note    Patient Name :Genet Wilks  Date of service : 04/22/25  MRN: [id number]  YOB: 1950      Interval History:  4/22/2025:  Patient continues to be on supplemental oxygen.  She has no fever, chills, rigors.  Blood sugar has improved.  Possibly going to be discharged  4/21/2025:  Patient continues to be on supplemental oxygen.  She has no fever, chills, rigors.  Blood sugar has been elevated.  Wheezing has improved.  Chest x-ray from April 16, 2025 showed bilateral hazy opacities.  Chest x-ray was personally reviewed and independently interpreted.  Patient will be discussed with primary team.  INR is therapeutic.  4/20/2025:  Patient is currently on low-flow oxygen improving wheezing, no fever no chills, asking about going home  4/19/2025  Still room air to low-flow oxygen but still have significant wheezing and rhonchi  4/18/2025  Patient reports improve her shortness of breath, less dyspneic, no fever no chills.  History of Present Illness:      75 y.o. female  on day  5 of admission presenting with COPD (chronic obstructive pulmonary disease) (Multi).  Genet Wilks is a 75-year-old female who presents to the ED with shortness of breath. Patient has a past medical history of asthma, COPD wears at bedtime O2 and PRN, MI, atrial valve replacement, A-fib on Coumadin, CAD, heart failure, DM, HLD, and hypothyroidism. Patient reports on April 11/25 she began noticing increased shortness of breath. On Saturday morning she had to start wearing her oxygen during the day which she only typically wears at nighttime, she later had to increase from 2 LNC to 3 LNC. Patient did not notice much improvement and called her doctor to make an appointment to be seen but he was unable to get her in. Her PCP ordered an antibiotic for her which she was taking at home. Patient began to have diarrhea that was watery but nonbloody on Monday and Tuesday of this week. Patient also had 1 episode of  nonbloody emesis. Patient denies any nausea since just a decreased appetite. Patient reports she has been drinking fluids. Patient denies any increase in swelling. Patient denies any fevers at home. Patient does report having a worsening moist cough with a thick sputum production. Patient decided come to the ER today as the shortness of breath has been increasingly getting worse. Patient denies any chest pain, syncopal episodes, urinary symptoms weakness, or new open skin sores.   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, Personal history of other diseases of the circulatory system, Personal history of other diseases of the circulatory system, Personal history of other diseases of the circulatory system, Personal history of other diseases of the musculoskeletal system and connective tissue, Personal history of other diseases of the nervous system and sense organs, Personal history of other diseases of the respiratory system, Personal history of other endocrine, nutritional and metabolic disease, Personal history of other endocrine, nutritional and metabolic disease, Personal history of other endocrine, nutritional and metabolic disease, Personal history of other specified conditions, Presence of other heart-valve replacement, and Presence of prosthetic heart valve (06/02/2022).   has a past surgical history that includes Other surgical history (11/13/2014); Cardiac surgery (11/13/2014); Tubal ligation (11/13/2014); Other surgical history (11/13/2014); Tonsillectomy (11/13/2014); Knee surgery (06/15/2018); and Cardiac catheterization (06/15/2018).   reports that she has never smoked. She has never been exposed to tobacco smoke. She has never used smokeless tobacco. She reports that she does not currently  use alcohol. She reports that she does not use drugs.  Family History:   Family medical history includes stroke in father.    Allergies:     Penicillins, Prednisone, Mushroom, and Simvastatin      Social history:   reports that she has never smoked. She has never been exposed to tobacco smoke. She has never used smokeless tobacco. She reports that she does not currently use alcohol. She reports that she does not use drugs.      Review of Systems:   General: denies weight gain, denies loss of appetite, fever, chills, night sweats.  HEENT: denies headaches, dizziness, head trauma, visual changes, eye pain, tinnitus, nosebleeds, hoarseness or throat pain    Respiratory: denies chest pain, dyspnea, cough and hemoptysis  Cardiovascular: denies orthopnea, paroxysmal nocturnal dyspnea, leg swelling, and previous heart attack.    Gastrointestinal: denies pain, nausea vomiting, diarrhea, constipation, melena or bleeding.  Genitourinary: denies hematuria, frequency, urgency or dysuria  Neurology: denies syncope, seizures, paralysis, paraesthesia   Endocrine: denies polyuria, polydipsia, skin or hair changes, and heat or cold intolerance  Musculoskeletal: denies joint pain, swelling, arthritis or myalgia  Hematologic: denies bleeding, adenopathy and easy bruising  Skin: denies rashes and skin discoloration  Psychiatry: denies depression    Physical Exam:   Vital Signs:   Vitals:    04/22/25 1122   BP:    Pulse:    Resp:    Temp:    SpO2: 95%     Vitals:    04/22/25 0650   Weight: 84.2 kg (185 lb 9.6 oz)         Input/Output:    Intake/Output Summary (Last 24 hours) at 4/22/2025 1325  Last data filed at 4/22/2025 0800  Gross per 24 hour   Intake 1302 ml   Output 1200 ml   Net 102 ml       Oxygen requirements:    Ventilator Information:  FiO2 (%):  [21 %-28 %] 21 %  Oxygen Therapy/Pulse Ox  Medical Gas Therapy: None (Room air)  Medical Gas Delivery Method: Nasal cannula  FiO2 (%): 21 %  SpO2: 95 %  Patient Activity During SpO2  Measurement: At rest  Temp: 35.4 °C (95.7 °F)        General appearance: Not in pain or distress, in no respiratory distress    HEENT: Atraumatic/normocephalic, EOMI, YVETTE, pharynx clear, moist mucosa, redness of the uvula appreciated,   Neck: Supple, no jugular venous distension, lymphadenopathy, thyromegaly or carotid bruits  Chest: Diffuse wheezing or rhonchi hopefully with more mobility agree with the current management of IV Solu-Medrol  P.o. azithromycin  Monitor diarrhea, syndrome is most consistent with viral infection  Cardiovascular: Normal S1, S2, regular rate and rhythm, no murmur, rub or gallop  Abdomen: Normal sounds present, soft, lax with no tenderness, no hepatosplenomegaly, and no masses  Extremities: No edema. Pulses are equally present.   Skin: intact, no rashes   Neurologic: Alert and oriented x 3, No focal deficit         Current Medications:   Scheduled medications  Scheduled Medications[1]  Continuous medications  Continuous Medications[2]  PRN medications  PRN Medications[3]      Investigations:  Labs, radiological imaging and cardiac work up were personally reviewed    Results from last 7 days   Lab Units 04/22/25  0636 04/21/25  0638 04/20/25  0718   SODIUM mmol/L 140 135* 140   POTASSIUM mmol/L 3.6 4.0 4.1   CHLORIDE mmol/L 100 97* 100   CO2 mmol/L 34* 33* 32   BUN mg/dL 38* 41* 42*   CREATININE mg/dL 0.91 0.91 1.05   GLUCOSE mg/dL 178* 350* 332*   CALCIUM mg/dL 9.1 9.1 9.3     Results from last 7 days   Lab Units 04/22/25  0636   WBC AUTO x10*3/uL 10.1   HEMOGLOBIN g/dL 11.8*   HEMATOCRIT % 37.1   PLATELETS AUTO x10*3/uL 279         Imaging  XR chest 2 views  Result Date: 4/16/2025  Mild vascular congestion. Signed by Alex Prado MD      Cardiology, Vascular, and Other Imaging  No other imaging results found for the past 7 days      Assessment  Genet Wilks IS 75 y.o. female  on day  5 of admission presenting with COPD (chronic obstructive pulmonary disease) (Multi). Actively being  treated for the  following medical Problems:    Acute on chronic hypoxic respiratory failure  Acute exacerbation of COPD    Recommendation:  Improved wheezing and rhonchi  Home O2 assessment, possible discharge soon  patient chest x-ray showed chronic infiltrate, no acute findings to indicate active pneumonia  Symptoms most consistent with COPD exacerbation in the context of a viral syndrome  S/P IV Rocephin/azithromycin  Oxygen for saturation of 89 to 94%  Incentive spirometry  Bronchodilators therapy as needed  PT/OT  DVT prophylaxis  Minimize benzodiazepine and narcotics  Encourage ambulation  Head of bed elevation and aspiration precautions.  INR is 2.7 which is therapeutic.        Christa Tran MD  Pulmonary critical care consultant  04/22/25  1:25 PM       STAFF PHYSICIAN NOTE OF PERSONAL INVOLVEMENT IN CARE  As the attending physician, I certify that I personally reviewed the patient's history and personally examined the patient to confirm the physical findings described above, and that I reviewed the relevant imaging studies and available reports.  I also discussed the differential diagnosis and all of the proposed management plans with the patient and individuals accompanying the patient to this visit.  They had the opportunity to ask questions about the proposed management plans and to have those questions answered.           [1] amLODIPine, 5 mg, oral, Daily  aspirin, 81 mg, oral, Daily before lunch  atorvastatin, 20 mg, oral, Nightly  cholecalciferol, 50 mcg, oral, Daily  ferrous sulfate, 65 mg of iron, oral, Daily with breakfast  furosemide, 20 mg, intravenous, BID  insulin glargine, 10 Units, subcutaneous, q24h  insulin lispro, 0-10 Units, subcutaneous, Before meals & nightly  ipratropium-albuteroL, 3 mL, nebulization, q8h  levothyroxine, 75 mcg, oral, Daily before breakfast  metoprolol succinate XL, 25 mg, oral, Daily  warfarin, 2.5 mg, oral, Once per day on Sunday Tuesday Wednesday Thursday  Friday Saturday  warfarin, 5 mg, oral, Every Monday     [2]    [3] PRN medications: acetaminophen, albuterol, benzonatate, dextrose, dextrose, glucagon, glucagon, guaiFENesin, melatonin, ondansetron, oxygen

## 2025-04-22 NOTE — CARE PLAN
The patient's goals for the shift include rest and comfort.    The clinical goals for the shift include improved breathing, less cough  congestion.

## 2025-04-23 VITALS
HEART RATE: 80 BPM | RESPIRATION RATE: 18 BRPM | TEMPERATURE: 86.4 F | DIASTOLIC BLOOD PRESSURE: 62 MMHG | SYSTOLIC BLOOD PRESSURE: 129 MMHG | WEIGHT: 185.6 LBS | BODY MASS INDEX: 34.16 KG/M2 | HEIGHT: 62 IN | OXYGEN SATURATION: 95 %

## 2025-04-23 LAB
ALBUMIN SERPL BCP-MCNC: 3.4 G/DL (ref 3.4–5)
ALP SERPL-CCNC: 65 U/L (ref 33–136)
ALT SERPL W P-5'-P-CCNC: 12 U/L (ref 7–45)
ANION GAP SERPL CALC-SCNC: 14 MMOL/L (ref 10–20)
AST SERPL W P-5'-P-CCNC: 12 U/L (ref 9–39)
BASOPHILS # BLD AUTO: 0.01 X10*3/UL (ref 0–0.1)
BASOPHILS NFR BLD AUTO: 0.1 %
BILIRUB SERPL-MCNC: 1.2 MG/DL (ref 0–1.2)
BUN SERPL-MCNC: 38 MG/DL (ref 6–23)
CALCIUM SERPL-MCNC: 8.9 MG/DL (ref 8.6–10.3)
CHLORIDE SERPL-SCNC: 99 MMOL/L (ref 98–107)
CO2 SERPL-SCNC: 32 MMOL/L (ref 21–32)
CREAT SERPL-MCNC: 0.86 MG/DL (ref 0.5–1.05)
EGFRCR SERPLBLD CKD-EPI 2021: 71 ML/MIN/1.73M*2
EOSINOPHIL # BLD AUTO: 0.16 X10*3/UL (ref 0–0.4)
EOSINOPHIL NFR BLD AUTO: 1.8 %
ERYTHROCYTE [DISTWIDTH] IN BLOOD BY AUTOMATED COUNT: 14.4 % (ref 11.5–14.5)
GLUCOSE BLD MANUAL STRIP-MCNC: 137 MG/DL (ref 74–99)
GLUCOSE BLD MANUAL STRIP-MCNC: 220 MG/DL (ref 74–99)
GLUCOSE SERPL-MCNC: 140 MG/DL (ref 74–99)
HCT VFR BLD AUTO: 40.2 % (ref 36–46)
HGB BLD-MCNC: 12.6 G/DL (ref 12–16)
IMM GRANULOCYTES # BLD AUTO: 0.09 X10*3/UL (ref 0–0.5)
IMM GRANULOCYTES NFR BLD AUTO: 1 % (ref 0–0.9)
LYMPHOCYTES # BLD AUTO: 1.86 X10*3/UL (ref 0.8–3)
LYMPHOCYTES NFR BLD AUTO: 21 %
MCH RBC QN AUTO: 26.8 PG (ref 26–34)
MCHC RBC AUTO-ENTMCNC: 31.3 G/DL (ref 32–36)
MCV RBC AUTO: 86 FL (ref 80–100)
MONOCYTES # BLD AUTO: 0.78 X10*3/UL (ref 0.05–0.8)
MONOCYTES NFR BLD AUTO: 8.8 %
NEUTROPHILS # BLD AUTO: 5.97 X10*3/UL (ref 1.6–5.5)
NEUTROPHILS NFR BLD AUTO: 67.3 %
NRBC BLD-RTO: 0 /100 WBCS (ref 0–0)
PLATELET # BLD AUTO: 266 X10*3/UL (ref 150–450)
POTASSIUM SERPL-SCNC: 3.6 MMOL/L (ref 3.5–5.3)
PROT SERPL-MCNC: 6.1 G/DL (ref 6.4–8.2)
RBC # BLD AUTO: 4.7 X10*6/UL (ref 4–5.2)
SODIUM SERPL-SCNC: 141 MMOL/L (ref 136–145)
WBC # BLD AUTO: 8.9 X10*3/UL (ref 4.4–11.3)

## 2025-04-23 PROCEDURE — 94640 AIRWAY INHALATION TREATMENT: CPT

## 2025-04-23 PROCEDURE — 2500000002 HC RX 250 W HCPCS SELF ADMINISTERED DRUGS (ALT 637 FOR MEDICARE OP, ALT 636 FOR OP/ED): Mod: JZ | Performed by: STUDENT IN AN ORGANIZED HEALTH CARE EDUCATION/TRAINING PROGRAM

## 2025-04-23 PROCEDURE — 85025 COMPLETE CBC W/AUTO DIFF WBC: CPT | Performed by: INTERNAL MEDICINE

## 2025-04-23 PROCEDURE — 36415 COLL VENOUS BLD VENIPUNCTURE: CPT | Performed by: INTERNAL MEDICINE

## 2025-04-23 PROCEDURE — 94669 MECHANICAL CHEST WALL OSCILL: CPT

## 2025-04-23 PROCEDURE — 84075 ASSAY ALKALINE PHOSPHATASE: CPT | Performed by: INTERNAL MEDICINE

## 2025-04-23 PROCEDURE — 2500000002 HC RX 250 W HCPCS SELF ADMINISTERED DRUGS (ALT 637 FOR MEDICARE OP, ALT 636 FOR OP/ED)

## 2025-04-23 PROCEDURE — 99232 SBSQ HOSP IP/OBS MODERATE 35: CPT | Performed by: STUDENT IN AN ORGANIZED HEALTH CARE EDUCATION/TRAINING PROGRAM

## 2025-04-23 PROCEDURE — 2500000001 HC RX 250 WO HCPCS SELF ADMINISTERED DRUGS (ALT 637 FOR MEDICARE OP)

## 2025-04-23 PROCEDURE — 2500000004 HC RX 250 GENERAL PHARMACY W/ HCPCS (ALT 636 FOR OP/ED): Mod: JZ

## 2025-04-23 PROCEDURE — 82947 ASSAY GLUCOSE BLOOD QUANT: CPT

## 2025-04-23 PROCEDURE — 2500000002 HC RX 250 W HCPCS SELF ADMINISTERED DRUGS (ALT 637 FOR MEDICARE OP, ALT 636 FOR OP/ED): Performed by: NURSE PRACTITIONER

## 2025-04-23 RX ADMIN — IPRATROPIUM BROMIDE AND ALBUTEROL SULFATE 3 ML: .5; 3 SOLUTION RESPIRATORY (INHALATION) at 06:52

## 2025-04-23 RX ADMIN — Medication 50 MCG: at 09:07

## 2025-04-23 RX ADMIN — INSULIN LISPRO 4 UNITS: 100 INJECTION, SOLUTION INTRAVENOUS; SUBCUTANEOUS at 13:18

## 2025-04-23 RX ADMIN — FUROSEMIDE 20 MG: 10 INJECTION, SOLUTION INTRAMUSCULAR; INTRAVENOUS at 09:08

## 2025-04-23 RX ADMIN — LEVOTHYROXINE SODIUM 75 MCG: 0.07 TABLET ORAL at 06:32

## 2025-04-23 RX ADMIN — ASPIRIN 81 MG: 81 TABLET, COATED ORAL at 13:19

## 2025-04-23 RX ADMIN — METOPROLOL SUCCINATE 25 MG: 25 TABLET, EXTENDED RELEASE ORAL at 09:08

## 2025-04-23 RX ADMIN — AMLODIPINE BESYLATE 5 MG: 5 TABLET ORAL at 09:08

## 2025-04-23 RX ADMIN — FERROUS SULFATE TAB 325 MG (65 MG ELEMENTAL FE) 325 MG: 325 (65 FE) TAB at 09:08

## 2025-04-23 ASSESSMENT — COGNITIVE AND FUNCTIONAL STATUS - GENERAL
DAILY ACTIVITIY SCORE: 24
MOBILITY SCORE: 24

## 2025-04-23 ASSESSMENT — PAIN SCALES - WONG BAKER: WONGBAKER_NUMERICALRESPONSE: NO HURT

## 2025-04-23 ASSESSMENT — PAIN SCALES - GENERAL: PAINLEVEL_OUTOF10: 0 - NO PAIN

## 2025-04-23 NOTE — CARE PLAN
The patient's goals for the shift include  improvement with breathing, less shortness of breath    The clinical goals for the shift include Patient's SpO2 will remain above 92%

## 2025-04-23 NOTE — CARE PLAN
The patient's goals for the shift include rest.     The clinical goals for the shift include Patient's SpO2 will remain above 92%

## 2025-04-23 NOTE — PROGRESS NOTES
Pulmonary Critical Care note    Patient Name :Genet Wilks  Date of service : 04/23/25  MRN: [id number]  YOB: 1950      Interval History:  4/23/2025:  Patient has been weaned off supplemental oxygen.  She has no fever, chills, rigors.  She continues to faint wheezing on exam.  Blood sugar has improved.    4/22/2025:  Patient continues to be on supplemental oxygen.  She has no fever, chills, rigors.  Blood sugar has improved.  Possibly going to be discharged  4/21/2025:  Patient continues to be on supplemental oxygen.  She has no fever, chills, rigors.  Blood sugar has been elevated.  Wheezing has improved.  Chest x-ray from April 16, 2025 showed bilateral hazy opacities.  Chest x-ray was personally reviewed and independently interpreted.  Patient will be discussed with primary team.  INR is therapeutic.  4/20/2025:  Patient is currently on low-flow oxygen improving wheezing, no fever no chills, asking about going home  4/19/2025  Still room air to low-flow oxygen but still have significant wheezing and rhonchi  4/18/2025  Patient reports improve her shortness of breath, less dyspneic, no fever no chills.  History of Present Illness:      75 y.o. female  on day  6 of admission presenting with COPD (chronic obstructive pulmonary disease) (Multi).  Genet Wilks is a 75-year-old female who presents to the ED with shortness of breath. Patient has a past medical history of asthma, COPD wears at bedtime O2 and PRN, MI, atrial valve replacement, A-fib on Coumadin, CAD, heart failure, DM, HLD, and hypothyroidism. Patient reports on April 11/25 she began noticing increased shortness of breath. On Saturday morning she had to start wearing her oxygen during the day which she only typically wears at nighttime, she later had to increase from 2 LNC to 3 LNC. Patient did not notice much improvement and called her doctor to make an appointment to be seen but he was unable to get her in. Her PCP ordered an antibiotic  for her which she was taking at home. Patient began to have diarrhea that was watery but nonbloody on Monday and Tuesday of this week. Patient also had 1 episode of nonbloody emesis. Patient denies any nausea since just a decreased appetite. Patient reports she has been drinking fluids. Patient denies any increase in swelling. Patient denies any fevers at home. Patient does report having a worsening moist cough with a thick sputum production. Patient decided come to the ER today as the shortness of breath has been increasingly getting worse. Patient denies any chest pain, syncopal episodes, urinary symptoms weakness, or new open skin sores.   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, Personal history of other diseases of the circulatory system, Personal history of other diseases of the circulatory system, Personal history of other diseases of the circulatory system, Personal history of other diseases of the musculoskeletal system and connective tissue, Personal history of other diseases of the nervous system and sense organs, Personal history of other diseases of the respiratory system, Personal history of other endocrine, nutritional and metabolic disease, Personal history of other endocrine, nutritional and metabolic disease, Personal history of other endocrine, nutritional and metabolic disease, Personal history of other specified conditions, Presence of other heart-valve replacement, and Presence of prosthetic heart valve (06/02/2022).   has a past surgical history that includes Other surgical history (11/13/2014); Cardiac surgery (11/13/2014); Tubal ligation (11/13/2014); Other surgical history (11/13/2014); Tonsillectomy (11/13/2014); Knee surgery (06/15/2018); and Cardiac catheterization  (06/15/2018).   reports that she has never smoked. She has never been exposed to tobacco smoke. She has never used smokeless tobacco. She reports that she does not currently use alcohol. She reports that she does not use drugs.  Family History:   Family medical history includes stroke in father.    Allergies:     Penicillins, Prednisone, Mushroom, and Simvastatin      Social history:   reports that she has never smoked. She has never been exposed to tobacco smoke. She has never used smokeless tobacco. She reports that she does not currently use alcohol. She reports that she does not use drugs.      Review of Systems:   General: denies weight gain, denies loss of appetite, fever, chills, night sweats.  HEENT: denies headaches, dizziness, head trauma, visual changes, eye pain, tinnitus, nosebleeds, hoarseness or throat pain    Respiratory: denies chest pain, dyspnea, cough and hemoptysis  Cardiovascular: denies orthopnea, paroxysmal nocturnal dyspnea, leg swelling, and previous heart attack.    Gastrointestinal: denies pain, nausea vomiting, diarrhea, constipation, melena or bleeding.  Genitourinary: denies hematuria, frequency, urgency or dysuria  Neurology: denies syncope, seizures, paralysis, paraesthesia   Endocrine: denies polyuria, polydipsia, skin or hair changes, and heat or cold intolerance  Musculoskeletal: denies joint pain, swelling, arthritis or myalgia  Hematologic: denies bleeding, adenopathy and easy bruising  Skin: denies rashes and skin discoloration  Psychiatry: denies depression    Physical Exam:   Vital Signs:   Vitals:    04/23/25 1047   BP: 129/62   Pulse: 80   Resp: 18   Temp: (!) 30.2 °C (86.4 °F)   SpO2: 95%     Vitals:    04/22/25 0650   Weight: 84.2 kg (185 lb 9.6 oz)         Input/Output:    Intake/Output Summary (Last 24 hours) at 4/23/2025 1329  Last data filed at 4/23/2025 0634  Gross per 24 hour   Intake --   Output 1350 ml   Net -1350 ml       Oxygen requirements:    Ventilator  Information:  FiO2 (%):  [28 %] 28 %  Oxygen Therapy/Pulse Ox  Medical Gas Therapy: None (Room air)  Medical Gas Delivery Method: Nasal cannula  FiO2 (%): 28 %  SpO2: 95 %  Patient Activity During SpO2 Measurement: At rest  Temp: (!) 30.2 °C (86.4 °F)        General appearance: Not in pain or distress, in no respiratory distress    HEENT: Atraumatic/normocephalic, EOMI, YVETTE, pharynx clear, moist mucosa, redness of the uvula appreciated,   Neck: Supple, no jugular venous distension, lymphadenopathy, thyromegaly or carotid bruits  Chest: Diffuse wheezing or rhonchi hopefully with more mobility agree with the current management of IV Solu-Medrol  P.o. azithromycin  Monitor diarrhea, syndrome is most consistent with viral infection  Cardiovascular: Normal S1, S2, regular rate and rhythm, no murmur, rub or gallop  Abdomen: Normal sounds present, soft, lax with no tenderness, no hepatosplenomegaly, and no masses  Extremities: No edema. Pulses are equally present.   Skin: intact, no rashes   Neurologic: Alert and oriented x 3, No focal deficit         Current Medications:   Scheduled medications  Scheduled Medications[1]  Continuous medications  Continuous Medications[2]  PRN medications  PRN Medications[3]      Investigations:  Labs, radiological imaging and cardiac work up were personally reviewed    Results from last 7 days   Lab Units 04/23/25  0718 04/22/25  0636 04/21/25  0638   SODIUM mmol/L 141 140 135*   POTASSIUM mmol/L 3.6 3.6 4.0   CHLORIDE mmol/L 99 100 97*   CO2 mmol/L 32 34* 33*   BUN mg/dL 38* 38* 41*   CREATININE mg/dL 0.86 0.91 0.91   GLUCOSE mg/dL 140* 178* 350*   CALCIUM mg/dL 8.9 9.1 9.1     Results from last 7 days   Lab Units 04/23/25  0718   WBC AUTO x10*3/uL 8.9   HEMOGLOBIN g/dL 12.6   HEMATOCRIT % 40.2   PLATELETS AUTO x10*3/uL 266         Imaging  No results found.      Cardiology, Vascular, and Other Imaging  No other imaging results found for the past 7 days      Assessment  Genet Wilks  IS 75 y.o. female  on day  6 of admission presenting with COPD (chronic obstructive pulmonary disease) (Multi). Actively being treated for the  following medical Problems:    Acute on chronic hypoxic respiratory failure  Acute exacerbation of COPD    Recommendation:  Improved wheezing and rhonchi  Home O2 assessment, possible discharge soon  patient chest x-ray showed chronic infiltrate, no acute findings to indicate active pneumonia  Symptoms most consistent with COPD exacerbation in the context of a viral syndrome  S/P IV Rocephin/azithromycin  Oxygen for saturation of 89 to 94%  Incentive spirometry  Bronchodilators therapy as needed  PT/OT  DVT prophylaxis  Minimize benzodiazepine and narcotics  Encourage ambulation  Head of bed elevation and aspiration precautions.  INR is 2.7 which is therapeutic.        Christa Tran MD  Pulmonary critical care consultant  04/23/25  1:29 PM       STAFF PHYSICIAN NOTE OF PERSONAL INVOLVEMENT IN CARE  As the attending physician, I certify that I personally reviewed the patient's history and personally examined the patient to confirm the physical findings described above, and that I reviewed the relevant imaging studies and available reports.  I also discussed the differential diagnosis and all of the proposed management plans with the patient and individuals accompanying the patient to this visit.  They had the opportunity to ask questions about the proposed management plans and to have those questions answered.           [1] amLODIPine, 5 mg, oral, Daily  aspirin, 81 mg, oral, Daily before lunch  atorvastatin, 20 mg, oral, Nightly  cholecalciferol, 50 mcg, oral, Daily  ferrous sulfate, 65 mg of iron, oral, Daily with breakfast  furosemide, 20 mg, intravenous, BID  insulin glargine, 10 Units, subcutaneous, q24h  insulin lispro, 0-10 Units, subcutaneous, Before meals & nightly  ipratropium-albuteroL, 3 mL, nebulization, q8h  levothyroxine, 75 mcg, oral, Daily before  breakfast  metoprolol succinate XL, 25 mg, oral, Daily  warfarin, 2.5 mg, oral, Once per day on Sunday Tuesday Wednesday Thursday Friday Saturday  warfarin, 5 mg, oral, Every Monday     [2]    [3] PRN medications: acetaminophen, albuterol, benzonatate, dextrose, dextrose, glucagon, glucagon, guaiFENesin, melatonin, ondansetron, oxygen

## 2025-04-23 NOTE — PROGRESS NOTES
"Genet Wilks is a 75 y.o. female on day 6 of admission presenting with COPD (chronic obstructive pulmonary disease) (Multi).    Subjective   Doing better sugars better       Objective     Physical Exam  Constitutional:       Appearance: Normal appearance.   HENT:      Head: Normocephalic and atraumatic.      Right Ear: Tympanic membrane and ear canal normal.      Left Ear: Tympanic membrane and ear canal normal.      Mouth/Throat:      Mouth: Mucous membranes are moist.      Pharynx: Oropharynx is clear.   Eyes:      Extraocular Movements: Extraocular movements intact.      Conjunctiva/sclera: Conjunctivae normal.      Pupils: Pupils are equal, round, and reactive to light.   Cardiovascular:      Rate and Rhythm: Normal rate and regular rhythm.      Pulses: Normal pulses.      Heart sounds: Normal heart sounds.   Pulmonary:      Effort: Pulmonary effort is normal.      Breath sounds: Normal breath sounds.   Abdominal:      General: Abdomen is flat. Bowel sounds are normal.      Palpations: Abdomen is soft.   Musculoskeletal:         General: Normal range of motion.      Cervical back: Normal range of motion and neck supple.   Skin:     General: Skin is warm and dry.      Capillary Refill: Capillary refill takes 2 to 3 seconds.   Neurological:      General: No focal deficit present.      Mental Status: She is alert and oriented to person, place, and time. Mental status is at baseline.   Psychiatric:         Mood and Affect: Mood normal.         Behavior: Behavior normal.         Thought Content: Thought content normal.         Judgment: Judgment normal.         Last Recorded Vitals  Blood pressure 136/63, pulse 72, temperature 36 °C (96.8 °F), temperature source Temporal, resp. rate 16, height 1.575 m (5' 2\"), weight 84.2 kg (185 lb 9.6 oz), SpO2 98%.  Intake/Output last 3 Shifts:  I/O last 3 completed shifts:  In: 590 (7 mL/kg) [P.O.:590]  Out: 2150 (25.5 mL/kg) [Urine:2150 (0.7 mL/kg/hr)]  Weight: 84.2 kg "     Relevant Results                              Assessment & Plan  COPD (chronic obstructive pulmonary disease) (Multi)    CHF exacerbation (Multi)    Bilateral pneumonia    Diarrhea    COPD exacerbation (Multi)    Patient arrived to ED today after having symptoms of shortness of breath for 6 days.  Patient typically only wears oxygen at night for her COPD.  Patient has been wearing oxygen during the day for the past 5 days and has had to increase it to 3 L nasal cannula from 2 L.  Patient called her doctor to get an appointment was able to get in but had an antibiotic ordered which she has been taking without improvement in her symptoms.  Chest x-ray today in ED revealed mild vascular congestion with bilateral faint hazy opacities in the inferior lung bases which is similar to prior chest x-ray in January in which patient had pneumonia.  Patient had mild elevated BNP today.  Pulmonology consult placed and cardiology consult placed, appreciate recs.  Pending patient's INR levels, other antibiotics may need to be considered due to Coumadin.     Patient admitted to Dr. Rod burgos for further medical management.      # Pneumonia  # COPD exacerbation  # Acute hypoxia  # Shortness of breath  # Diarrhea  -Antibiotics initiated in ED, will continue azithromycin (check daily INR)  -Procalcitonin ordered  -Flu/COVID/RSV ordered  -DuoNeb scheduled, albuterol as needed  -Mucinex twice daily  -Tessalon Perles  -Solu-Medrol every 8  -Strep pneumo/Legionella urine  -As needed oxygen >92%  -Cardiac diet   -admitted to observation with telemetry    - q4 vitals    -morning labs, CBC, BMP  - Chest x-ray revealed faint hazy opacities representing pneumonia which were similar on prior x-ray in January 2025.     # CHF exacerbation  # Vascular congestion  # Elevated BNP  # Elevated troponin  # Subtherapeutic INR  - Cardiology consult  - Fluid restriction  - Strict I's and O's  - Daily weight  - Last echo 1/6/2025, EF 55%, left atrium  moderately dilated, AVR,  - Lasix given in ED 40 mg, will continue 20 mg twice daily  -PT/OT/SW   -Daily INR, trend trop, mg+        Chronic Conditions   # CAD, HLD  # MI, atrial valve replacement, A-fib, CAD  # DM  -Hold home glycemic medication   -SSI with hypoglycemic protocol   -POCT  # Hypothyroidism  # Asthma     Continue home medications as ordered   Full Code      #DVT Prophylaxis   Scd's as tolerated   DVT Prophylaxis   On eliquis     Thorough record review performed of previous labs and notes from prior encounters.      4/17: breathing has improved, still wheezy, continue treatments, suspect dc in next 24-48 hrs    4/19 doing well no issues contineu treatments    4/22: sugars better contorled ok to dc    4/23: dc is in    I spent 35 minutes in the professional and overall care of this patient.      Rod Hansen, DO

## 2025-04-23 NOTE — NURSING NOTE
Discharge instructions reviewed with patient who voiced understanding of all instructions. Discharged to home with all belongings.  Awaiting family for transportation home.

## 2025-04-23 NOTE — NURSING NOTE
Called Dr. Hansen to clarify if patient should resume prednisone that had been stopped days ago.  Patient is questioning this.  Also Lantus is not ordered which helped patients glucometer reading become normal.  Dr Fonseca ( [facility] doctor) called, Sherrell CAAL called Lantus into her pharmary.  Patient had stopped Amaryl in the hospital (this not ordered in hospital)  Patient will ask Dr Fonseca about this.

## 2025-04-24 ENCOUNTER — APPOINTMENT (OUTPATIENT)
Dept: CARDIOLOGY | Facility: CLINIC | Age: 75
End: 2025-04-24
Payer: MEDICARE

## 2025-04-24 ENCOUNTER — PATIENT OUTREACH (OUTPATIENT)
Dept: PRIMARY CARE | Facility: CLINIC | Age: 75
End: 2025-04-24
Payer: MEDICARE

## 2025-04-24 NOTE — PROGRESS NOTES
Discharge Facility:[facility]  Discharge Diagnosis:# Pneumonia  # COPD exacerbation  # Acute hypoxia# Shortness of breath  # Diarrhea  ; Chest x-ray today in ED revealed mild vascular congestion with bilateral faint hazy opacities in the inferior lung bases which is similar to prior chest x-ray in January in which patient had pneumonia. Patient had mild elevated BNP today.  Admission Date:4.17.25  Discharge Date: 4.23.25    PCP Appointment Date:5.6.25  Specialist Appointment Date:   Hospital Encounter and Summary Linked: Yes  ED to Hosp-Admission (Discharged) with Rod Hansen DO; Sarai Boudreaux MD; Shahnaz Dai MD (04/16/2025)    2 call attempts

## 2025-04-25 ENCOUNTER — TELEPHONE (OUTPATIENT)
Dept: PRIMARY CARE | Facility: CLINIC | Age: 75
End: 2025-04-25
Payer: MEDICARE

## 2025-04-25 NOTE — TELEPHONE ENCOUNTER
Was discharged wed  And was unsure if she should be taking prednisone and lantus   Needs clarified   Because neither were sent in

## 2025-04-28 ENCOUNTER — ANTICOAGULATION - WARFARIN VISIT (OUTPATIENT)
Dept: PHARMACY | Facility: CLINIC | Age: 75
End: 2025-04-28
Payer: MEDICARE

## 2025-04-28 DIAGNOSIS — I48.0 PAROXYSMAL ATRIAL FIBRILLATION (MULTI): ICD-10-CM

## 2025-04-28 DIAGNOSIS — Z95.2 MECHANICAL HEART VALVE PRESENT: Primary | ICD-10-CM

## 2025-04-28 DIAGNOSIS — J44.1 COPD EXACERBATION (MULTI): ICD-10-CM

## 2025-04-28 DIAGNOSIS — E11.65 UNCONTROLLED TYPE 2 DIABETES MELLITUS WITH HYPERGLYCEMIA: Primary | ICD-10-CM

## 2025-04-28 LAB
POC INR: 4.7 (ref 0.9–1.1)
POC PROTHROMBIN TIME: ABNORMAL (ref 9.3–12.5)

## 2025-04-28 PROCEDURE — 99212 OFFICE O/P EST SF 10 MIN: CPT | Performed by: PHARMACIST

## 2025-04-28 PROCEDURE — 85610 PROTHROMBIN TIME: CPT | Mod: QW | Performed by: PHARMACIST

## 2025-04-28 RX ORDER — INSULIN GLARGINE 100 [IU]/ML
10 INJECTION, SOLUTION SUBCUTANEOUS NIGHTLY
Qty: 10 ML | Refills: 2 | Status: SHIPPED | OUTPATIENT
Start: 2025-04-28 | End: 2026-02-22

## 2025-04-28 RX ORDER — PREDNISONE 10 MG/1
TABLET ORAL
Qty: 30 TABLET | Refills: 0 | Status: SHIPPED | OUTPATIENT
Start: 2025-04-28 | End: 2025-05-08

## 2025-04-28 NOTE — PROGRESS NOTES
Coumadin Clinic Visit Note    Patient presents today for anticoagulation monitoring and adjustment.  Patient verified warfarin dosing schedule  Patient denies missing any doses  Patient denies unusual bruising or bleeding  Patient denies changes to alcohol or tobacco use.  Pt was on steroids x 9 days last week.  Consistent dietary green intake  There are no anticipated procedures at this time  INR Supratherapeutic today at 4.7  Hold dose today   Next appointment 2 weeks      Nu Laguna, PharmD

## 2025-04-28 NOTE — ASSESSMENT & PLAN NOTE
Orders:    predniSONE (Deltasone) 10 mg tablet; Take 5 tablets (50 mg) by mouth once daily for 2 days, THEN 4 tablets (40 mg) once daily for 2 days, THEN 3 tablets (30 mg) once daily for 2 days, THEN 2 tablets (20 mg) once daily for 2 days, THEN 1 tablet (10 mg) once daily for 2 days.

## 2025-04-28 NOTE — PROGRESS NOTES
Subjective   Reason for Visit: Genet Wilks is an 75 y.o. female here for a Medicare Wellness visit.               HPI    Patient Care Team:  Mitesh Simms DO as PCP - General  Mitesh Simms DO as PCP - [employer] PCP  Lul Day MD as Consulting Physician (Cardiology)  Maribel Hurley, RN as Care Manager (Case Management)     Review of Systems    Objective   Vitals:  LMP  (LMP Unknown)       Physical Exam    Assessment & Plan  COPD exacerbation (Multi)    Orders:    predniSONE (Deltasone) 10 mg tablet; Take 5 tablets (50 mg) by mouth once daily for 2 days, THEN 4 tablets (40 mg) once daily for 2 days, THEN 3 tablets (30 mg) once daily for 2 days, THEN 2 tablets (20 mg) once daily for 2 days, THEN 1 tablet (10 mg) once daily for 2 days.    Uncontrolled type 2 diabetes mellitus with hyperglycemia    Orders:    insulin glargine (Lantus) 100 unit/mL injection; Inject 10 Units under the skin once daily at bedtime. Take as directed per insulin instructions

## 2025-04-29 ENCOUNTER — TELEPHONE (OUTPATIENT)
Dept: PHARMACY | Facility: CLINIC | Age: 75
End: 2025-04-29
Payer: MEDICARE

## 2025-04-29 DIAGNOSIS — E03.9 HYPOTHYROIDISM, UNSPECIFIED TYPE: ICD-10-CM

## 2025-04-29 DIAGNOSIS — E13.9 DIABETES 1.5, MANAGED AS TYPE 2 (MULTI): ICD-10-CM

## 2025-04-29 DIAGNOSIS — E11.65 UNCONTROLLED TYPE 2 DIABETES MELLITUS WITH HYPERGLYCEMIA: ICD-10-CM

## 2025-04-29 PROBLEM — E66.811 CLASS 1 OBESITY WITH BODY MASS INDEX (BMI) OF 33.0 TO 33.9 IN ADULT: Status: ACTIVE | Noted: 2023-10-04

## 2025-04-29 RX ORDER — LEVOTHYROXINE SODIUM 75 UG/1
75 TABLET ORAL DAILY
Qty: 90 TABLET | Refills: 0 | Status: SHIPPED | OUTPATIENT
Start: 2025-04-29

## 2025-04-29 RX ORDER — GLIMEPIRIDE 2 MG/1
2 TABLET ORAL DAILY
Qty: 90 TABLET | Refills: 0 | Status: SHIPPED | OUTPATIENT
Start: 2025-04-29

## 2025-04-29 NOTE — TELEPHONE ENCOUNTER
Pt calls.  Had nose bleed bad in car today.  Inr was 4.7 yesterday.  Pt was told to hold dose yesterday only.  I told her to hold dose again today.  Told her If anymore bleeds, she can call us and we will see her sooner than 2 weeks.

## 2025-04-30 NOTE — DOCUMENTATION CLARIFICATION NOTE
"    PATIENT:               MELINA HATFIELD  ACCT #:                  [id number]  MRN:                       [id number]  :                       1950  ADMIT DATE:       2025 11:44 AM  DISCH DATE:        2025 2:09 PM  RESPONDING PROVIDER #:        [id number]          PROVIDER RESPONSE TEXT:    Insignificant clinically.    CDI QUERY TEXT:    Clarification    Instruction:    Based on your assessment of the patient and the clinical information, please provide the requested documentation by clicking on the appropriate radio button and enter any additional information if prompted.    Question: Is there a diagnosis indicative of the patient elevated Troponins and symptoms    When answering this query, please exercise your independent professional judgment. The fact that a question is being asked, does not imply that any particular answer is desired or expected.    The patient's clinical indicators include:  Clinical Information: 75 year old woman admitted for COPD and CHF exacerbations    Clinical Indicators:    ED TRIAGE NOTES : \"Pt coming from home. Has been using her breathing treatments every 4 hours which is not her usual. Has been needing to use oxygen at home after walking around and that's new. Has audible wheezing.\"    ED PN : \"Isolated troponin is elevated at 24, however this is a chronic and stable elevation as compared to 3 months ago.\"    H&P : \"# COPD exacerbation # Acute hypoxia # Shortness of breath # CHF exacerbation # Vascular congestion # Elevated BNP # Elevated troponin\"    CARDIOLOGY CONSULT: \"Initial labs notable for BNP of 146 with troponin of 19 and 24. History of CAD/MI: No chest pain\"    TROPONIN  - :  24  19  11    Treatment: Cardiology consult, Zithromax  - , Albuterol  - , Solu-Medrol  - , Amlodipine  - , IV Lasix  -     Risk Factors: COPD exacerbation, CHF exacerbation, AFib, elderly, obesity, previous MI  Options " provided:  -- Type II MI 2/2 COPD and CHF exacerbations  -- Acute Myocardial Injury  -- Chronic Myocardial Injury  -- Other - I will add my own diagnosis  -- Refer to Clinical Documentation Reviewer    Query created by: Shannan Trejo on 4/30/2025 8:08 AM      Electronically signed by:  PAIGE HERRON MD PhD 4/30/2025 6:23 PM

## 2025-05-02 DIAGNOSIS — E78.00 PURE HYPERCHOLESTEROLEMIA: ICD-10-CM

## 2025-05-02 RX ORDER — ATORVASTATIN CALCIUM 20 MG/1
20 TABLET, FILM COATED ORAL NIGHTLY
Qty: 90 TABLET | Refills: 3 | Status: SHIPPED | OUTPATIENT
Start: 2025-05-02

## 2025-05-06 ENCOUNTER — APPOINTMENT (OUTPATIENT)
Dept: PRIMARY CARE | Facility: CLINIC | Age: 75
End: 2025-05-06
Payer: MEDICARE

## 2025-05-12 ENCOUNTER — ANTICOAGULATION - WARFARIN VISIT (OUTPATIENT)
Dept: PHARMACY | Facility: CLINIC | Age: 75
End: 2025-05-12
Payer: MEDICARE

## 2025-05-12 DIAGNOSIS — Z95.2 MECHANICAL HEART VALVE PRESENT: Primary | ICD-10-CM

## 2025-05-12 DIAGNOSIS — I48.0 PAROXYSMAL ATRIAL FIBRILLATION (MULTI): ICD-10-CM

## 2025-05-12 LAB
POC INR: 1.9 (ref 0.9–1.1)
POC PROTHROMBIN TIME: ABNORMAL (ref 9.3–12.5)

## 2025-05-12 PROCEDURE — 99212 OFFICE O/P EST SF 10 MIN: CPT

## 2025-05-12 PROCEDURE — 85610 PROTHROMBIN TIME: CPT | Mod: QW

## 2025-05-12 NOTE — PROGRESS NOTES
No bleeding or unusual bruising.  Medications and doses verified.  No scheduled procedures at this time.  INR=1.9  No missed doses.  No OTC.  Diet is consistent, about 1 salad/week.   Plan: Boost dose today then continue same weekly dose.  Follow up INR check in 2 weeks.

## 2025-05-13 ENCOUNTER — OFFICE VISIT (OUTPATIENT)
Dept: CARDIOLOGY | Facility: CLINIC | Age: 75
End: 2025-05-13
Payer: MEDICARE

## 2025-05-13 VITALS
WEIGHT: 206 LBS | OXYGEN SATURATION: 96 % | SYSTOLIC BLOOD PRESSURE: 130 MMHG | HEIGHT: 63 IN | DIASTOLIC BLOOD PRESSURE: 80 MMHG | HEART RATE: 86 BPM | BODY MASS INDEX: 36.5 KG/M2

## 2025-05-13 DIAGNOSIS — I10 PRIMARY HYPERTENSION: ICD-10-CM

## 2025-05-13 DIAGNOSIS — I35.0 NONRHEUMATIC AORTIC VALVE STENOSIS: ICD-10-CM

## 2025-05-13 DIAGNOSIS — Z95.2 S/P MVR (MITRAL VALVE REPLACEMENT): Primary | ICD-10-CM

## 2025-05-13 DIAGNOSIS — I48.0 PAROXYSMAL ATRIAL FIBRILLATION (MULTI): ICD-10-CM

## 2025-05-13 DIAGNOSIS — E78.2 MIXED HYPERLIPIDEMIA: ICD-10-CM

## 2025-05-13 DIAGNOSIS — J43.2 CENTRILOBULAR EMPHYSEMA (MULTI): ICD-10-CM

## 2025-05-13 DIAGNOSIS — R94.31 ABNORMAL EKG: ICD-10-CM

## 2025-05-13 DIAGNOSIS — E66.811 CLASS 1 OBESITY WITHOUT SERIOUS COMORBIDITY WITH BODY MASS INDEX (BMI) OF 33.0 TO 33.9 IN ADULT, UNSPECIFIED OBESITY TYPE: ICD-10-CM

## 2025-05-13 LAB
Q ONSET: 211 MS
QRS COUNT: 14 BEATS
QRS DURATION: 106 MS
QT INTERVAL: 390 MS
QTC CALCULATION(BAZETT): 464 MS
QTC FREDERICIA: 438 MS
R AXIS: 70 DEGREES
T AXIS: -21 DEGREES
T OFFSET: 406 MS
VENTRICULAR RATE: 85 BPM

## 2025-05-13 PROCEDURE — 3075F SYST BP GE 130 - 139MM HG: CPT | Performed by: INTERNAL MEDICINE

## 2025-05-13 PROCEDURE — 3008F BODY MASS INDEX DOCD: CPT | Performed by: INTERNAL MEDICINE

## 2025-05-13 PROCEDURE — 1111F DSCHRG MED/CURRENT MED MERGE: CPT | Performed by: INTERNAL MEDICINE

## 2025-05-13 PROCEDURE — 3078F DIAST BP <80 MM HG: CPT | Performed by: INTERNAL MEDICINE

## 2025-05-13 PROCEDURE — 3052F HG A1C>EQUAL 8.0%<EQUAL 9.0%: CPT | Performed by: INTERNAL MEDICINE

## 2025-05-13 PROCEDURE — 99214 OFFICE O/P EST MOD 30 MIN: CPT | Performed by: INTERNAL MEDICINE

## 2025-05-13 PROCEDURE — 93010 ELECTROCARDIOGRAM REPORT: CPT | Performed by: INTERNAL MEDICINE

## 2025-05-13 PROCEDURE — 1160F RVW MEDS BY RX/DR IN RCRD: CPT | Performed by: INTERNAL MEDICINE

## 2025-05-13 PROCEDURE — 93005 ELECTROCARDIOGRAM TRACING: CPT | Performed by: INTERNAL MEDICINE

## 2025-05-13 PROCEDURE — 1159F MED LIST DOCD IN RCRD: CPT | Performed by: INTERNAL MEDICINE

## 2025-05-13 NOTE — PROGRESS NOTES
"  Subjective  Genet Wilks  is a 75 y.o. year old female who presents for S/P MVR.  Hopsitalized at [facility] with acute exacerbation of COPD from URI discharged 4/23/25.  Slight dyspnea since discharge.  Still notes cough.  To see pulmonologist in July.  To se Dr. Fonseca on5/20/25    Blood pressure 130/80, pulse 86, height 1.588 m (5' 2.5\"), weight 93.4 kg (206 lb), SpO2 96%.   Penicillins, Prednisone, Mushroom, and Simvastatin  Medical History[1]  Surgical History[2]  Family History[3]  @SOC    Current Medications[4]     ROS  Review of Systems    Physical Exam  Physical Exam  Constitutional:       Appearance: She is obese.   HENT:      Head: Normocephalic and atraumatic.   Cardiovascular:      Rate and Rhythm: Normal rate. Rhythm irregularly irregular.      Comments: Crisp valve sounds  Pulmonary:      Comments: Decreased breath sounds  Abdominal:      Palpations: Abdomen is soft.   Musculoskeletal:      Right lower leg: No edema.      Left lower leg: No edema.   Skin:     General: Skin is warm and dry.   Neurological:      General: No focal deficit present.      Mental Status: She is alert and oriented to person, place, and time.   Psychiatric:         Mood and Affect: Mood normal.         Behavior: Behavior normal.          EKG  Encounter Date: 05/13/25   ECG 12 Lead   Result Value    Ventricular Rate 85    QRS Duration 106    QT Interval 390    QTC Calculation(Bazett) 464    R Axis 70    T Axis -21    QRS Count 14    Q Onset 211    T Offset 406    QTC Fredericia 438    Narrative    Atrial fibrillation  ST & T wave abnormality, consider inferior ischemia  Abnormal ECG  When compared with ECG of 23-JAN-2025 14:43,  No significant change was found  Confirmed by Lul Day ([id number]) on 5/13/2025 4:02:02 PM       Problem List Items Addressed This Visit       Mixed hyperlipidemia    Primary hypertension    Abnormal EKG    Relevant Orders    ECG 12 Lead (Completed)    COPD (chronic obstructive pulmonary disease) " (Multi)    S/P MVR (mitral valve replacement) - Primary    1/6/25 echocardiogram normal mechanical MVR, mild aortic stenosis         Relevant Orders    Follow Up In Cardiology    Follow Up In Cardiology    Class 1 obesity with body mass index (BMI) of 33.0 to 33.9 in adult    Paroxysmal atrial fibrillation (Multi)    Relevant Orders    Follow Up In Cardiology    Follow Up In Cardiology    Nonrheumatic aortic valve stenosis    Mild on echocadiogram of 1/6/25              Same meds  Return 3 months with EKG      Lul Day MD          [1]   Past Medical History:  Diagnosis Date    CHF (congestive heart failure)     Long term (current) use of anticoagulants     Long-term (current) use of anticoagulants    Old myocardial infarction     History of non-ST elevation myocardial infarction (NSTEMI)    Personal history of (corrected) congenital malformations of heart and circulatory system     History of congenital anomaly of heart    Personal history of other diseases of the circulatory system     History of rheumatic fever    Personal history of other diseases of the circulatory system     History of mitral valve prolapse    Personal history of other diseases of the circulatory system     History of atrial fibrillation    Personal history of other diseases of the musculoskeletal system and connective tissue     History of back pain    Personal history of other diseases of the nervous system and sense organs     History of carpal tunnel syndrome    Personal history of other diseases of the respiratory system     History of asthma    Personal history of other endocrine, nutritional and metabolic disease     History of morbid obesity    Personal history of other endocrine, nutritional and metabolic disease     History of hypothyroidism    Personal history of other endocrine, nutritional and metabolic disease     History of hypercholesterolemia    Personal history of other specified conditions     History of chest pain     Presence of other heart-valve replacement     Heart valve replaced by other means    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    Mechanical heart valve present   [2]   Past Surgical History:  Procedure Laterality Date    CARDIAC CATHETERIZATION  06/15/2018    Cardiac Cath Procedure Outcome:    CARDIAC SURGERY  11/13/2014    Heart Surgery    KNEE SURGERY  06/15/2018    Knee Surgery    OTHER SURGICAL HISTORY  11/13/2014    Surgery    OTHER SURGICAL HISTORY  11/13/2014    Colposcopy Cervix With Biopsy(S)    TONSILLECTOMY  11/13/2014    Tonsillectomy    TUBAL LIGATION  11/13/2014    Tubal Ligation   [3]   Family History  Problem Relation Name Age of Onset    Diabetes Mother      Stroke Father      Hypertension Father      Diabetes Sister      Breast cancer Sister      Throat cancer Brother      Diabetes Brother      Other (S/P CABG x3) Brother      Cancer Other sibling    [4]   Current Outpatient Medications   Medication Sig Dispense Refill    albuterol 0.63 mg/3 mL nebulizer solution Take 3 mL (0.63 mg) by nebulization 3 times a day. 270 mL 3    albuterol 90 mcg/actuation inhaler Inhale 2 puffs every 6 hours if needed for wheezing or shortness of breath. 18 g 2    amLODIPine (Norvasc) 5 mg tablet Take 1 tablet by mouth once daily 90 tablet 3    aspirin 81 mg EC tablet Take 1 tablet (81 mg) by mouth once daily at noon. Take before meals..      atorvastatin (Lipitor) 20 mg tablet TAKE 1 TABLET BY MOUTH AT BEDTIME 90 tablet 3    benzonatate (Tessalon) 100 mg capsule Take 1 capsule (100 mg) by mouth 3 times a day as needed for cough. 20 capsule 0    blood sugar diagnostic (OneTouch Ultra Test) strip USE 1 STRIP TO CHECK GLUCOSE ONCE DAILY 100 each 3    cholecalciferol (Vitamin D-3) 50 MCG (2000 UT) tablet Take 1 tablet (2,000 Units) by mouth once daily.      ferrous sulfate (iron) 325 mg (65 mg elemental) tablet Take 1 tablet (325 mg) by mouth once daily with breakfast.      furosemide (Lasix) 40 mg tablet Take 1  tablet (40 mg) by mouth once daily as needed (swelling). 90 tablet 1    glimepiride (Amaryl) 2 mg tablet TAKE 1 TABLET BY MOUTH ONCE DAILY AS DIRECTED 90 tablet 0    insulin glargine (Lantus) 100 unit/mL injection Inject 10 Units under the skin once daily at bedtime. Take as directed per insulin instructions 10 mL 2    lancets (OneTouch Delica Plus Lancet) 33 gauge misc USE 1 TO CHECK GLUCOSE ONCE DAILY 100 each 3    levothyroxine (Synthroid, Levoxyl) 75 mcg tablet TAKE 1 TABLET BY MOUTH ONCE DAILY AS DIRECTED 90 tablet 0    metoprolol succinate XL (Toprol-XL) 25 mg 24 hr tablet Take 1 tablet (25 mg) by mouth once daily. 90 tablet 1    oxygen (O2) gas therapy Inhale 3 L/min once daily as needed.      warfarin (Coumadin) 5 mg tablet Take 5 mg (1 tablet) every Monday and 2.5 mg (1/2 tablet) all other days or as directed by the anticoagulation clinic. 55 tablet 3     No current facility-administered medications for this visit.      [id number]

## 2025-05-14 ENCOUNTER — PATIENT OUTREACH (OUTPATIENT)
Dept: PRIMARY CARE | Facility: CLINIC | Age: 75
End: 2025-05-14
Payer: MEDICARE

## 2025-05-14 NOTE — PROGRESS NOTES
Confirmation of at least 2 patient identifiers.   Completed telephonic follow-up with patient after recent visit with Dr Mendoza   Patient reports feeling: Improved   Patient has questions or concerns about medications: No   Have all prescribed medications been filled? Yes   Patient has necessary resources to manage their care? Yes   Patient has questions or concerns? No   Next care management follow-up approximately within one month. Care  provided to patient.

## 2025-05-20 ENCOUNTER — APPOINTMENT (OUTPATIENT)
Dept: CARDIOLOGY | Facility: HOSPITAL | Age: 75
End: 2025-05-20
Payer: MEDICARE

## 2025-05-20 ENCOUNTER — HOSPITAL ENCOUNTER (EMERGENCY)
Facility: HOSPITAL | Age: 75
Discharge: LEFT BEFORE DISCHARGE | End: 2025-05-21
Payer: MEDICARE

## 2025-05-20 VITALS
SYSTOLIC BLOOD PRESSURE: 198 MMHG | TEMPERATURE: 98.2 F | BODY MASS INDEX: 36.5 KG/M2 | OXYGEN SATURATION: 97 % | WEIGHT: 206 LBS | HEIGHT: 63 IN | RESPIRATION RATE: 18 BRPM | HEART RATE: 92 BPM | DIASTOLIC BLOOD PRESSURE: 86 MMHG

## 2025-05-20 PROCEDURE — 99283 EMERGENCY DEPT VISIT LOW MDM: CPT

## 2025-05-20 PROCEDURE — 93005 ELECTROCARDIOGRAM TRACING: CPT

## 2025-05-20 PROCEDURE — 99281 EMR DPT VST MAYX REQ PHY/QHP: CPT

## 2025-05-21 ENCOUNTER — TELEPHONE (OUTPATIENT)
Dept: PHARMACY | Facility: CLINIC | Age: 75
End: 2025-05-21

## 2025-05-21 ENCOUNTER — APPOINTMENT (OUTPATIENT)
Dept: PRIMARY CARE | Facility: CLINIC | Age: 75
End: 2025-05-21
Payer: MEDICARE

## 2025-05-21 VITALS
WEIGHT: 203 LBS | TEMPERATURE: 97.9 F | BODY MASS INDEX: 35.97 KG/M2 | SYSTOLIC BLOOD PRESSURE: 142 MMHG | DIASTOLIC BLOOD PRESSURE: 60 MMHG | HEIGHT: 63 IN | HEART RATE: 77 BPM | OXYGEN SATURATION: 97 % | RESPIRATION RATE: 18 BRPM

## 2025-05-21 DIAGNOSIS — I38 VALVULAR HEART DISEASE: ICD-10-CM

## 2025-05-21 DIAGNOSIS — E13.9 DIABETES 1.5, MANAGED AS TYPE 2 (MULTI): Primary | ICD-10-CM

## 2025-05-21 DIAGNOSIS — E11.65 UNCONTROLLED TYPE 2 DIABETES MELLITUS WITH HYPERGLYCEMIA: ICD-10-CM

## 2025-05-21 DIAGNOSIS — Z79.01 ANTICOAGULATED ON COUMADIN: ICD-10-CM

## 2025-05-21 DIAGNOSIS — J41.1 MUCOPURULENT CHRONIC BRONCHITIS (MULTI): ICD-10-CM

## 2025-05-21 LAB
HBA1C MFR BLD: 7.7 % (ref 4.2–6.5)
POC FINGERSTICK BLOOD GLUCOSE: 157 MG/DL (ref 70–100)
POC INR: 4.1 (ref 0.9–1.1)
Q ONSET: 212 MS
QRS COUNT: 18 BEATS
QRS DURATION: 104 MS
QT INTERVAL: 352 MS
QTC CALCULATION(BAZETT): 480 MS
QTC FREDERICIA: 433 MS
R AXIS: 122 DEGREES
T AXIS: -27 DEGREES
T OFFSET: 388 MS
VENTRICULAR RATE: 112 BPM

## 2025-05-21 PROCEDURE — 1126F AMNT PAIN NOTED NONE PRSNT: CPT | Performed by: FAMILY MEDICINE

## 2025-05-21 PROCEDURE — 1111F DSCHRG MED/CURRENT MED MERGE: CPT | Performed by: FAMILY MEDICINE

## 2025-05-21 PROCEDURE — 3051F HG A1C>EQUAL 7.0%<8.0%: CPT | Performed by: FAMILY MEDICINE

## 2025-05-21 PROCEDURE — 1159F MED LIST DOCD IN RCRD: CPT | Performed by: FAMILY MEDICINE

## 2025-05-21 PROCEDURE — 3008F BODY MASS INDEX DOCD: CPT | Performed by: FAMILY MEDICINE

## 2025-05-21 PROCEDURE — 83036 HEMOGLOBIN GLYCOSYLATED A1C: CPT | Mod: CLIA WAIVED TEST | Performed by: FAMILY MEDICINE

## 2025-05-21 PROCEDURE — 1036F TOBACCO NON-USER: CPT | Performed by: FAMILY MEDICINE

## 2025-05-21 PROCEDURE — 82962 GLUCOSE BLOOD TEST: CPT | Performed by: FAMILY MEDICINE

## 2025-05-21 PROCEDURE — 3077F SYST BP >= 140 MM HG: CPT | Performed by: FAMILY MEDICINE

## 2025-05-21 PROCEDURE — 1160F RVW MEDS BY RX/DR IN RCRD: CPT | Performed by: FAMILY MEDICINE

## 2025-05-21 PROCEDURE — 3078F DIAST BP <80 MM HG: CPT | Performed by: FAMILY MEDICINE

## 2025-05-21 PROCEDURE — 99214 OFFICE O/P EST MOD 30 MIN: CPT | Performed by: FAMILY MEDICINE

## 2025-05-21 PROCEDURE — 85610 PROTHROMBIN TIME: CPT | Performed by: FAMILY MEDICINE

## 2025-05-21 RX ORDER — INSULIN GLARGINE 100 [IU]/ML
10 INJECTION, SOLUTION SUBCUTANEOUS NIGHTLY
Qty: 10 ML | Refills: 2 | Status: SHIPPED | OUTPATIENT
Start: 2025-05-21 | End: 2026-03-17

## 2025-05-21 ASSESSMENT — PATIENT HEALTH QUESTIONNAIRE - PHQ9
2. FEELING DOWN, DEPRESSED OR HOPELESS: NOT AT ALL
1. LITTLE INTEREST OR PLEASURE IN DOING THINGS: NOT AT ALL
SUM OF ALL RESPONSES TO PHQ9 QUESTIONS 1 AND 2: 0

## 2025-05-21 ASSESSMENT — PAIN SCALES - GENERAL: PAINLEVEL_OUTOF10: 0-NO PAIN

## 2025-05-21 NOTE — ED TRIAGE NOTES
Pt states sob and mid sternal chest pain that radiates to back and up throat. Pain started at 2000. Pt has a hx of afib

## 2025-05-21 NOTE — PROGRESS NOTES
Subjective   Genet Wilks is a 75 y.o. female who presents for Follow-up (Follow up visit, patient was hospitalized 4/16/25 for Bronchitis and discharged 4/24/25. Patient returned to ER for heart palpitations yesterday, sat for 5 hours and left.).    HPI  : Patient is a 75  year old diabetic female in for follow up from hospital  last month. Patient has a history of asthmatic bronchitis and chronic bronchitis.  She also has a previous history of mitral valve replacement surgery and is on Coumadin daily.  She states she went to the hospital last evening for rapid heartbeat and apparently had an ECG done at the hospital but then sat in the waiting room for 5 hours since they were very busy.  She then went home at about 3:00 in the morning because she was tired of waiting.    Objective  : ROS :10 systems were reviewed and the information is included in the HPI and no additional review of systems is indicated.    Physical Exam  Vitals and nursing note reviewed.   Constitutional:       Appearance: Normal appearance. She is obese.      Comments: Patient is alert and oriented x3.   No acute distress   HENT:      Head: Normocephalic.      Right Ear: Tympanic membrane and external ear normal.      Left Ear: Tympanic membrane and external ear normal.      Ears:      Comments: Ears are patent bilaterally and TMs are clear.     Nose: Nose normal.      Mouth/Throat:      Mouth: Mucous membranes are moist.      Pharynx: Oropharynx is clear. Posterior oropharyngeal erythema present.      Comments: Mouth is moist, tongue is midline.  Mild  posterior pharyngeal erythema.  Eyes:      Extraocular Movements: Extraocular movements intact.      Conjunctiva/sclera: Conjunctivae normal.      Pupils: Pupils are equal, round, and reactive to light.      Comments: No visual disturbance, does have her eyes examined once a year.   Neck:      Comments: Short broad neck with arthritis and muscle spasm.  No carotid bruits, no thyromegaly, no  cervical adenopathy.   Cardiovascular:      Rate and Rhythm: Normal rate. Rhythm irregular.      Pulses: Normal pulses.      Heart sounds: Normal heart sounds.      Comments: Patient has history of Mitral valve  replacement . On Coumadin. History of Atrial fibrillation.   Heart rhythm is stable S1 and S2 are noted, occasional ectopics.  Click from valve replacement.  Pulmonary:      Effort: Pulmonary effort is normal.      Breath sounds: Rhonchi present.      Comments: Patient has a history of chronic asthmatic bronchitis and also chronic bronchitis.  She does use a home nebulizer machine at least 3 or 4 times daily.  She was in the hospital last month with an aggravation of chronic bronchitis and asthma.  Today she has few scattered rhonchi but essentially   clear in the upper lobes and some mucus congestion in the bases.  I did give her some Mucinex DM to try.  Every spring, fall and winter her asthma gets worse.  Abdominal:      General: Bowel sounds are normal.      Palpations: Abdomen is soft.      Comments: Abdomen is soft and mildly obese but nontender.  No flank tenderness.  No suprapubic pain.  No abdominal guarding and no rebound tenderness.   Genitourinary:     Comments: Patient denies dysuria, no hematuria, no nocturia, denies flank pain.  Musculoskeletal:         General: Tenderness present. Normal range of motion.      Cervical back: Normal range of motion. Tenderness present.      Comments: Osteoarthritis of the hips and knees.  Age-related arthritis in the joints.  Restriction of motion cervical and lumbar spines due to arthritis.  Mild ankle edema.  No calf tenderness.   Skin:     General: Skin is warm.      Comments: There is no bruising, no erythema, no skin lesions noted, no rashes.   Neurological:      General: No focal deficit present.      Mental Status: She is alert and oriented to person, place, and time. Mental status is at baseline.      Sensory: Sensory deficit present.      Comments: No  focal neurosensory deficits are noted.  Patient has diabetic peripheral neuropathy.  Coordination and gait are stable.  Decreased muscle strength upper and lower extremities.   Psychiatric:         Mood and Affect: Mood normal.         Behavior: Behavior normal.         Thought Content: Thought content normal.         Judgment: Judgment normal.      Comments: Patient has anxious mood and affect.  Some anxious depression since her  passed away several years ago.  Thought content and judgment are stable.  No signs of vascular dementia.  Behavior is normal.     PLAN : Patient is a 75-year-old diabetic patient who was evaluated today for blood sugar and A1c and also her Coumadin INR.  Blood sugar was 157 and he A1c improved to 7.7% down from 8.5% back in February.  Coumadin INR was 4.1% and she will skip today's Coumadin dose.  She does go next week to have her Coumadin level checked again at the hospital clinic.  She also has a mild cough but is using her home nebulizer machine and will also try some Mucinex DM.  She had been to the emergency room last night but left after 5 hours of sitting without being evaluated.  She will follow-up in 3 to 4 months or sooner as needed.  She is also using 10 units of Lantus insulin at bedtime which has helped improve her blood sugar.    Problem List Items Addressed This Visit    None           Mitesh Simms,

## 2025-05-21 NOTE — TELEPHONE ENCOUNTER
Pt had pcp visit today and her inr was drawn at 4.1.  She was told to hold warfarin today by the pcp and she does have appointment with us in clinic next week.

## 2025-05-28 ENCOUNTER — ANTICOAGULATION - WARFARIN VISIT (OUTPATIENT)
Dept: PHARMACY | Facility: CLINIC | Age: 75
End: 2025-05-28
Payer: MEDICARE

## 2025-05-28 DIAGNOSIS — Z95.2 S/P MVR (MITRAL VALVE REPLACEMENT): Primary | ICD-10-CM

## 2025-05-28 DIAGNOSIS — I48.0 PAROXYSMAL ATRIAL FIBRILLATION (MULTI): ICD-10-CM

## 2025-05-28 LAB
POC INR: 3.4 (ref 0.9–1.1)
POC PROTHROMBIN TIME: ABNORMAL (ref 9.3–12.5)

## 2025-05-28 NOTE — PROGRESS NOTES
Coumadin Clinic Visit Note    HPI  Genet Wilks is a 75 y.o. female with history of atrial fibrillation and presence of mechanical heart valve who presents today for anticoagulation monitoring and adjustment. Last INR 1.9 on 5/12 (2 week follow up).  Current dose: 5 mg (5 mg x 1) every Mon; 2.5 mg (5 mg x 0.5) all other days   INR goal: 2.5-3.5    Subjective  Patient verified warfarin dose   Patient had appointment with Dr. Mitesh Simms on 5/21 and INR was 4.1. Was instructed to hold dose on that Wednesday.  No missed doses  No unusual bruising or bleeding  No changes to medications  Consistent dietary green intake  No recent hospital admissions   No anticipated procedures at this time    Assessment: INR of 3.4 is Therapeutic today for goal range of 2.0-3.0. This  may be reflective of the 1 dose hold.  Plan: No changes to warfarin dose today  Follow Up: Next appointment 2 weeks - wanted to do an appointment sooner since patient has fluctuating INR values and had range out of value last week for Dr. Fam Varghese, PharmD

## 2025-06-09 ENCOUNTER — ANTICOAGULATION - WARFARIN VISIT (OUTPATIENT)
Dept: PHARMACY | Facility: CLINIC | Age: 75
End: 2025-06-09
Payer: MEDICARE

## 2025-06-09 DIAGNOSIS — Z95.2 S/P MVR (MITRAL VALVE REPLACEMENT): Primary | ICD-10-CM

## 2025-06-09 DIAGNOSIS — I48.0 PAROXYSMAL ATRIAL FIBRILLATION (MULTI): ICD-10-CM

## 2025-06-09 LAB
POC INR: 3.9 (ref 0.9–1.1)
POC PROTHROMBIN TIME: ABNORMAL (ref 9.3–12.5)

## 2025-06-09 PROCEDURE — 85610 PROTHROMBIN TIME: CPT | Mod: QW | Performed by: PHARMACIST

## 2025-06-09 PROCEDURE — 99212 OFFICE O/P EST SF 10 MIN: CPT | Performed by: PHARMACIST

## 2025-06-09 NOTE — PROGRESS NOTES
Verified current dose with pt.    No new meds or med changes since last visit.  Pt denies unusual bleed/bruise.  No upcoming procedures.  Inr = 3.9  No pain meds  No etoh or nicotine  Pt says less greens than usual  Pt says she has lost about 11 lbs since April  Pt says appetite is not good.  Pt says she is eating about once a day and only picking (had [employer] bbq pork sandwich one day; another day a bowl of minestrone soup)  Hold dose today (Mon).  Decrease weekly dose by about 12%.    check again in 2 weeks.

## 2025-06-12 ENCOUNTER — PATIENT OUTREACH (OUTPATIENT)
Dept: PRIMARY CARE | Facility: CLINIC | Age: 75
End: 2025-06-12
Payer: MEDICARE

## 2025-06-16 DIAGNOSIS — Z95.2 HISTORY OF PROSTHETIC HEART VALVE: ICD-10-CM

## 2025-06-16 RX ORDER — WARFARIN SODIUM 5 MG/1
TABLET ORAL
Qty: 55 TABLET | Refills: 1 | Status: SHIPPED | OUTPATIENT
Start: 2025-06-16

## 2025-06-16 NOTE — TELEPHONE ENCOUNTER
Erx sent to  [phone]  Warfarin 5 mg tablet  Take 2.5 mg every day or UD  #55 (90 day)   1 RF  Dr. Lul Day

## 2025-06-23 ENCOUNTER — ANTICOAGULATION - WARFARIN VISIT (OUTPATIENT)
Dept: PHARMACY | Facility: CLINIC | Age: 75
End: 2025-06-23
Payer: MEDICARE

## 2025-06-23 DIAGNOSIS — I48.0 PAROXYSMAL ATRIAL FIBRILLATION (MULTI): ICD-10-CM

## 2025-06-23 DIAGNOSIS — Z95.2 S/P MVR (MITRAL VALVE REPLACEMENT): Primary | ICD-10-CM

## 2025-06-23 LAB
POC INR: 3 (ref 0.9–1.1)
POC PROTHROMBIN TIME: ABNORMAL (ref 9.3–12.5)

## 2025-06-23 PROCEDURE — 85610 PROTHROMBIN TIME: CPT | Mod: QW

## 2025-06-23 PROCEDURE — 99212 OFFICE O/P EST SF 10 MIN: CPT

## 2025-06-23 NOTE — PROGRESS NOTES
No bleeding. Has a couple little bruises on arms.  Medications and doses verified.  No scheduled procedures at this time.  INR=3.0   Plan: Continue same weekly dose.  Follow up INR check in 3 weeks.

## 2025-06-30 ENCOUNTER — APPOINTMENT (OUTPATIENT)
Dept: PRIMARY CARE | Facility: CLINIC | Age: 75
End: 2025-06-30
Payer: MEDICARE

## 2025-07-10 ENCOUNTER — ANTICOAGULATION - WARFARIN VISIT (OUTPATIENT)
Dept: PHARMACY | Facility: CLINIC | Age: 75
End: 2025-07-10
Payer: MEDICARE

## 2025-07-10 DIAGNOSIS — I48.0 PAROXYSMAL ATRIAL FIBRILLATION (MULTI): ICD-10-CM

## 2025-07-10 DIAGNOSIS — Z95.2 S/P MVR (MITRAL VALVE REPLACEMENT): Primary | ICD-10-CM

## 2025-07-10 LAB
POC INR: 3.1 (ref 0.9–1.1)
POC PROTHROMBIN TIME: ABNORMAL (ref 9.3–12.5)

## 2025-07-10 PROCEDURE — 85610 PROTHROMBIN TIME: CPT | Mod: QW

## 2025-07-10 RX ORDER — METOPROLOL SUCCINATE 25 MG/1
25 TABLET, EXTENDED RELEASE ORAL DAILY
Qty: 90 TABLET | Refills: 3 | Status: SHIPPED | OUTPATIENT
Start: 2025-07-10

## 2025-07-10 NOTE — PROGRESS NOTES
Genet Wilks is a 75 y.o. female with history of Mitral Valve Replacement who presents today for anticoagulation monitoring and adjustment. Last INR 3.0 on 6/23/25 (3 week follow up)    Current dose: 2.5 mg (5 mg x 0.5) every day   INR goal: 2.5-3.5    Today's INR: 3.1    Subjective  Patient verified warfarin dose: Yes   Missed doses since last INR visit: : Yes   Unusual bruising or bleeding: No   Changes to medications: No   Consistent dietary green intake: No   Recent illness or antibiotic use: No   Recent hospital admissions: No   Anticipated procedures at this time: No   Change in acetaminophen/ibuprofen use: No       Assessment: INR of 3.1 is therapeutic today for goal range of 2.5-3.5   Plan: No adjustment to warfarin dose today, continue regimen as stated above    Follow-up: 5 weeks     Alisa Walker  PGY-1 Pharmacy Resident  7/10/2025 1:56 PM

## 2025-07-15 DIAGNOSIS — Z95.2 S/P MVR (MITRAL VALVE REPLACEMENT): ICD-10-CM

## 2025-07-25 DIAGNOSIS — M25.472 ANKLE EDEMA, BILATERAL: ICD-10-CM

## 2025-07-25 DIAGNOSIS — M25.471 ANKLE EDEMA, BILATERAL: ICD-10-CM

## 2025-07-25 RX ORDER — FUROSEMIDE 40 MG/1
40 TABLET ORAL DAILY PRN
Qty: 90 TABLET | Refills: 3 | Status: SHIPPED | OUTPATIENT
Start: 2025-07-25

## 2025-07-26 DIAGNOSIS — E03.9 HYPOTHYROIDISM, UNSPECIFIED TYPE: ICD-10-CM

## 2025-07-29 RX ORDER — LEVOTHYROXINE SODIUM 75 UG/1
75 TABLET ORAL DAILY
Qty: 90 TABLET | Refills: 0 | Status: SHIPPED | OUTPATIENT
Start: 2025-07-29

## 2025-08-12 ENCOUNTER — APPOINTMENT (OUTPATIENT)
Dept: PHARMACY | Facility: CLINIC | Age: 75
End: 2025-08-12
Payer: MEDICARE

## 2025-08-14 ENCOUNTER — APPOINTMENT (OUTPATIENT)
Dept: CARDIOLOGY | Facility: CLINIC | Age: 75
End: 2025-08-14
Payer: MEDICARE

## 2025-08-18 ENCOUNTER — APPOINTMENT (OUTPATIENT)
Dept: PHARMACY | Facility: CLINIC | Age: 75
End: 2025-08-18
Payer: MEDICARE

## 2025-08-18 DIAGNOSIS — E11.9 TYPE 2 DIABETES MELLITUS WITHOUT COMPLICATION, WITHOUT LONG-TERM CURRENT USE OF INSULIN: ICD-10-CM

## 2025-08-18 RX ORDER — BLOOD SUGAR DIAGNOSTIC
STRIP MISCELLANEOUS
Qty: 100 EACH | Refills: 3 | Status: SHIPPED | OUTPATIENT
Start: 2025-08-18

## 2025-08-20 DIAGNOSIS — E11.9 DIABETES MELLITUS WITHOUT COMPLICATION: ICD-10-CM

## 2025-08-20 RX ORDER — BLOOD SUGAR DIAGNOSTIC
1 STRIP MISCELLANEOUS 2 TIMES DAILY
Qty: 200 STRIP | Refills: 3 | Status: SHIPPED | OUTPATIENT
Start: 2025-08-20

## 2025-08-20 RX ORDER — LANCETS
1 EACH MISCELLANEOUS 2 TIMES DAILY
Qty: 200 EACH | Refills: 3 | Status: SHIPPED | OUTPATIENT
Start: 2025-08-20

## 2025-08-20 RX ORDER — DEXTROSE 4 G
TABLET,CHEWABLE ORAL
Qty: 1 EACH | Refills: 0 | Status: SHIPPED | OUTPATIENT
Start: 2025-08-20

## 2025-08-21 ENCOUNTER — ANTICOAGULATION - WARFARIN VISIT (OUTPATIENT)
Dept: PHARMACY | Facility: CLINIC | Age: 75
End: 2025-08-21
Payer: MEDICARE

## 2025-08-21 DIAGNOSIS — Z95.2 S/P MVR (MITRAL VALVE REPLACEMENT): Primary | ICD-10-CM

## 2025-08-21 DIAGNOSIS — I48.0 PAROXYSMAL ATRIAL FIBRILLATION (MULTI): ICD-10-CM

## 2025-08-21 LAB
POC INR: 2.2 (ref 0.9–1.1)
POC PROTHROMBIN TIME: ABNORMAL (ref 9.3–12.5)

## 2025-08-21 PROCEDURE — 85610 PROTHROMBIN TIME: CPT | Mod: QW

## 2025-08-21 PROCEDURE — 99212 OFFICE O/P EST SF 10 MIN: CPT

## 2025-08-27 DIAGNOSIS — E11.65 UNCONTROLLED TYPE 2 DIABETES MELLITUS WITH HYPERGLYCEMIA: ICD-10-CM

## 2025-08-27 DIAGNOSIS — E13.9 DIABETES 1.5, MANAGED AS TYPE 2 (MULTI): ICD-10-CM

## 2025-08-28 RX ORDER — GLIMEPIRIDE 2 MG/1
2 TABLET ORAL DAILY
Qty: 90 TABLET | Refills: 1 | Status: SHIPPED | OUTPATIENT
Start: 2025-08-28

## 2025-09-04 DIAGNOSIS — M25.471 ANKLE EDEMA, BILATERAL: ICD-10-CM

## 2025-09-04 DIAGNOSIS — M25.472 ANKLE EDEMA, BILATERAL: ICD-10-CM

## 2025-09-04 RX ORDER — AMLODIPINE BESYLATE 5 MG/1
5 TABLET ORAL DAILY
Qty: 90 TABLET | Refills: 3 | Status: SHIPPED | OUTPATIENT
Start: 2025-09-04

## 2025-09-15 ENCOUNTER — APPOINTMENT (OUTPATIENT)
Dept: PRIMARY CARE | Facility: CLINIC | Age: 75
End: 2025-09-15
Payer: MEDICARE